# Patient Record
Sex: FEMALE | Race: WHITE
[De-identification: names, ages, dates, MRNs, and addresses within clinical notes are randomized per-mention and may not be internally consistent; named-entity substitution may affect disease eponyms.]

---

## 2017-01-20 ENCOUNTER — HOSPITAL ENCOUNTER (OUTPATIENT)
Dept: HOSPITAL 47 - RADMAMWWP | Age: 77
Discharge: HOME | End: 2017-01-20
Payer: MEDICARE

## 2017-01-20 DIAGNOSIS — Z12.31: Primary | ICD-10-CM

## 2017-01-20 PROCEDURE — 77063 BREAST TOMOSYNTHESIS BI: CPT

## 2017-01-23 NOTE — MM
Reason for exam: screening  (asymptomatic).

Last mammogram was performed 1 year and 1 month ago.



History:

Patient is postmenopausal and has history of endometrial cancer at age 70.

Family history of breast cancer in sister at age 78.

Benign US biopsy breast VAD LT of the left breast, April 15, 2015.  Benign core 

biopsy of the left breast, 1990.  Benign core biopsy of the right breast, 1980.



Physical Findings:

A clinical breast exam by your physician is recommended on an annual basis and 

results should be correlated with mammographic findings.



MG 3D Screening Mammo W/Cad

Bilateral CC and MLO view(s) were taken.

Prior study comparison: December 10, 2015, bilateral MG 3d diag mammo w/cad [facility].  

December 11, 2014, bilateral MG diagnostic mammo w CAD [facility].

The breast tissue is heterogeneously dense. This may lower the sensitivity of 

mammography.  Benign calcifications. Previous mammotome biopsy in the left breast.

There is chronic nodularity bilaterally.  No significant changes when compared 

with prior studies.





ASSESSMENT: Benign, BI-RAD 2



RECOMMENDATION:

Routine screening mammogram of both breasts in 1 year.

## 2017-05-11 ENCOUNTER — HOSPITAL ENCOUNTER (OUTPATIENT)
Dept: HOSPITAL 47 - RADXRMAIN | Age: 77
Discharge: HOME | End: 2017-05-11
Payer: MEDICARE

## 2017-05-11 DIAGNOSIS — J20.8: Primary | ICD-10-CM

## 2017-05-11 PROCEDURE — 71020: CPT

## 2017-05-11 NOTE — XR
EXAMINATION TYPE: XR chest 2V

 

DATE OF EXAM: 5/11/2017 9:20 AM

 

COMPARISON: NONE

 

TECHNIQUE: PA and lateral views submitted.

 

HISTORY: Cough

 

FINDINGS:

The lungs are clear and  there is no pneumothorax, pleural effusion, or focal pneumonia.  Vascular ca
lcifications noted. Apical pleural thickening. Atherosclerotic change aorta. Borderline cardiomegaly.


 

IMPRESSION: 

1. No acute process.

## 2017-06-13 ENCOUNTER — HOSPITAL ENCOUNTER (OUTPATIENT)
Dept: HOSPITAL 47 - RADBDWWP | Age: 77
Discharge: HOME | End: 2017-06-13
Payer: MEDICARE

## 2017-06-13 DIAGNOSIS — M85.88: Primary | ICD-10-CM

## 2017-06-13 PROCEDURE — 77080 DXA BONE DENSITY AXIAL: CPT

## 2017-06-13 NOTE — BD
EXAMINATION TYPE: MG DEXA axial skeleton.  

 

DATE OF EXAM: 6/13/2017

 

COMPARISON: 2015

 

CLINICAL HISTORY: osteoporosis

 

Height:  5'4

Weight:  133

 

FRAX RISK QUESTIONS:

Alcohol (3 or more units per day):  no

Family History (Parent hip fracture):  no

Glucocorticoids (More than 3mos):  no

           (Ex: prednisone, prednisolone, methylprednisolone, dexamethasone, and hydrocortisone).    
     

History of Fracture in Adulthood: no

Secondary Osteoporosis:

  1.  Type 1 Diabetes: no

  2.  Hyperthyroidism: no

  3.  Menopause before 45: no

  4.  Malnutrition: no

  5.  Chronic liver disease: no

Rheumatoid Arthritis: no

Current Tobacco Use: no

 

RISK FACTORS 

HISTORY OF: 

Family History of Osteoporosis:

Active:

Diet low in dairy products/other sources of calcium:

Postmenopausal woman:

 

MEDICATIONS: 

Osteoporosis Medications: 

fosamax

 3 years

Additional Medications: heart  

 

 

Additional History:

 

 

EXAM MEASUREMENTS: 

Bone mineral densitometry was performed using the [employer] System.

Bone mineral density as measured about the Lumbar spine is:  

----- L1-L4(G/cm2): 1.452

T Score Values are as follows:

----- L2: 3.0

----- L3: 3.5

----- L4: 1.1

----- L1-L4: 2.3

Bone mineral density has: Increased 7.7% since study of: 06/08/2015

 

Bone mineral density about the R hip (g/cm2): 0.891

Bone mineral density about the L hip (g/cm2): 0.882

T Score values are as follows:

-----R Neck: -1.1

-----L Neck: -1.1

-----R Total: -0.9

-----L Total: -1.4

Bone mineral density has: Increased 2.0% since study of: 06/08/2015

 

 

IMPRESSION:

Osteopenia (T Score between -2.5 and -1 as noted by T score values : Terry Hips

There is slightly increased risk of fracture and the patient may be considered 

for treatment. Re-Screen 2-5 years.

 

Bone density has improved 2% within the bilateral hips from 2015. Bone density has improved 7.7% with
in the lumbar spine from 2015

 

 

 

NOTE:  T-SCORE=SD OF THE YOUNG ADULT MEAN.

## 2017-08-03 ENCOUNTER — HOSPITAL ENCOUNTER (OUTPATIENT)
Dept: HOSPITAL 47 - LABWHC1 | Age: 77
Discharge: HOME | End: 2017-08-03
Payer: MEDICARE

## 2017-08-03 DIAGNOSIS — E11.9: Primary | ICD-10-CM

## 2017-08-03 DIAGNOSIS — E78.5: ICD-10-CM

## 2017-08-03 LAB
ALP SERPL-CCNC: 65 U/L (ref 38–126)
ALT SERPL-CCNC: 28 U/L (ref 9–52)
ANION GAP SERPL CALC-SCNC: 6 MMOL/L
AST SERPL-CCNC: 23 U/L (ref 14–36)
BUN SERPL-SCNC: 17 MG/DL (ref 7–17)
CALCIUM SPEC-MCNC: 9 MG/DL (ref 8.4–10.2)
CH: 31.4
CHCM: 31.6
CHLORIDE SERPL-SCNC: 107 MMOL/L (ref 98–107)
CHOLEST SERPL-MCNC: 195 MG/DL (ref ?–200)
CO2 SERPL-SCNC: 30 MMOL/L (ref 22–30)
ERYTHROCYTE [DISTWIDTH] IN BLOOD BY AUTOMATED COUNT: 4.23 M/UL (ref 3.8–5.4)
ERYTHROCYTE [DISTWIDTH] IN BLOOD: 14.2 % (ref 11.5–15.5)
GLUCOSE SERPL-MCNC: 86 MG/DL (ref 74–99)
HCT VFR BLD AUTO: 42.3 % (ref 34–46)
HDLC SERPL-MCNC: 62 MG/DL (ref 40–60)
HDW: 2.93
HEMOGLOBIN A1C: 5.6 % (ref 4.2–6.1)
HGB BLD-MCNC: 13.5 GM/DL (ref 11.4–16)
MCH RBC QN AUTO: 32 PG (ref 25–35)
MCHC RBC AUTO-ENTMCNC: 32 G/DL (ref 31–37)
MCV RBC AUTO: 100 FL (ref 80–100)
NON-AFRICAN AMERICAN GFR(MDRD): >60
POTASSIUM SERPL-SCNC: 4.6 MMOL/L (ref 3.5–5.1)
PROT SERPL-MCNC: 6.5 G/DL (ref 6.3–8.2)
SODIUM SERPL-SCNC: 143 MMOL/L (ref 137–145)
WBC # BLD AUTO: 5.4 K/UL (ref 3.8–10.6)

## 2017-08-03 PROCEDURE — 83036 HEMOGLOBIN GLYCOSYLATED A1C: CPT

## 2017-08-03 PROCEDURE — 80061 LIPID PANEL: CPT

## 2017-08-03 PROCEDURE — 36415 COLL VENOUS BLD VENIPUNCTURE: CPT

## 2017-08-03 PROCEDURE — 80053 COMPREHEN METABOLIC PANEL: CPT

## 2017-08-03 PROCEDURE — 84443 ASSAY THYROID STIM HORMONE: CPT

## 2017-08-03 PROCEDURE — 85027 COMPLETE CBC AUTOMATED: CPT

## 2017-08-03 PROCEDURE — 84439 ASSAY OF FREE THYROXINE: CPT

## 2017-09-19 ENCOUNTER — HOSPITAL ENCOUNTER (OUTPATIENT)
Dept: HOSPITAL 47 - RADUSWWP | Age: 77
End: 2017-09-19
Payer: MEDICARE

## 2017-09-19 DIAGNOSIS — R26.0: ICD-10-CM

## 2017-09-19 DIAGNOSIS — G62.9: Primary | ICD-10-CM

## 2017-09-19 PROCEDURE — 93970 EXTREMITY STUDY: CPT

## 2017-09-19 NOTE — US
EXAMINATION TYPE: US venous doppler duplex LE 

 

DATE OF EXAM: 9/19/2017 1:57 PM

 

COMPARISON: US

 

CLINICAL HISTORY: R26.0 Ataxic gait,G62.9 Polyneuropathy.

 

SIDE PERFORMED: Bilateral  

 

TECHNIQUE:  The lower extremity deep venous system is examined utilizing real time linear array sonog
shreya with graded compression, doppler sonography and color-flow sonography.

 

VESSELS IMAGED:

External Iliac Vein (EIV)

Common Femoral Vein

Deep Femoral Vein

Greater Saphenous Vein *

Femoral Vein

Popliteal Vein

Small Saphenous Vein *

Proximal Calf Veins

(* superficial vessels)

 

Grayscale, color doppler, spectral doppler imaging performed of the deep veins of the lower extremiti
es.  There is normal flow, compressibility, vascular waveforms.

 

Results phoned to Leah at office.

 

Right Leg:  Negative for DVT

 

Left Leg:  Negative for DVT

 

 

 

IMPRESSION:  No evidence of DVT.

## 2017-12-04 ENCOUNTER — HOSPITAL ENCOUNTER (OUTPATIENT)
Dept: HOSPITAL 47 - RADFLWHC | Age: 77
End: 2017-12-04
Payer: MEDICARE

## 2017-12-04 DIAGNOSIS — Z53.9: Primary | ICD-10-CM

## 2018-01-16 ENCOUNTER — HOSPITAL ENCOUNTER (EMERGENCY)
Dept: HOSPITAL 47 - EC | Age: 78
Discharge: HOME | End: 2018-01-16
Payer: MEDICARE

## 2018-01-16 VITALS
HEART RATE: 93 BPM | SYSTOLIC BLOOD PRESSURE: 169 MMHG | TEMPERATURE: 97.9 F | DIASTOLIC BLOOD PRESSURE: 89 MMHG | RESPIRATION RATE: 17 BRPM

## 2018-01-16 DIAGNOSIS — M81.0: ICD-10-CM

## 2018-01-16 DIAGNOSIS — Z88.6: ICD-10-CM

## 2018-01-16 DIAGNOSIS — M54.9: ICD-10-CM

## 2018-01-16 DIAGNOSIS — R00.0: ICD-10-CM

## 2018-01-16 DIAGNOSIS — R11.2: ICD-10-CM

## 2018-01-16 DIAGNOSIS — R19.7: ICD-10-CM

## 2018-01-16 DIAGNOSIS — Z79.899: ICD-10-CM

## 2018-01-16 DIAGNOSIS — G62.9: ICD-10-CM

## 2018-01-16 DIAGNOSIS — R10.9: Primary | ICD-10-CM

## 2018-01-16 DIAGNOSIS — M79.89: ICD-10-CM

## 2018-01-16 LAB
ALBUMIN SERPL-MCNC: 3.5 G/DL (ref 3.5–5)
ALP SERPL-CCNC: 78 U/L (ref 38–126)
ALT SERPL-CCNC: 31 U/L (ref 9–52)
ANION GAP SERPL CALC-SCNC: 10 MMOL/L
AST SERPL-CCNC: 23 U/L (ref 14–36)
BASOPHILS # BLD AUTO: 0 K/UL (ref 0–0.2)
BASOPHILS NFR BLD AUTO: 0 %
BUN SERPL-SCNC: 22 MG/DL (ref 7–17)
CALCIUM SPEC-MCNC: 9.2 MG/DL (ref 8.4–10.2)
CHLORIDE SERPL-SCNC: 108 MMOL/L (ref 98–107)
CO2 SERPL-SCNC: 26 MMOL/L (ref 22–30)
EOSINOPHIL # BLD AUTO: 0.1 K/UL (ref 0–0.7)
EOSINOPHIL NFR BLD AUTO: 1 %
ERYTHROCYTE [DISTWIDTH] IN BLOOD BY AUTOMATED COUNT: 4.09 M/UL (ref 3.8–5.4)
ERYTHROCYTE [DISTWIDTH] IN BLOOD: 14.8 % (ref 11.5–15.5)
GLUCOSE SERPL-MCNC: 107 MG/DL (ref 74–99)
HCT VFR BLD AUTO: 39.1 % (ref 34–46)
HGB BLD-MCNC: 11.6 GM/DL (ref 11.4–16)
LIPASE SERPL-CCNC: 202 U/L (ref 23–300)
LYMPHOCYTES # SPEC AUTO: 0.9 K/UL (ref 1–4.8)
LYMPHOCYTES NFR SPEC AUTO: 7 %
MAGNESIUM SPEC-SCNC: 1.9 MG/DL (ref 1.6–2.3)
MCH RBC QN AUTO: 28.3 PG (ref 25–35)
MCHC RBC AUTO-ENTMCNC: 29.7 G/DL (ref 31–37)
MCV RBC AUTO: 95.4 FL (ref 80–100)
MONOCYTES # BLD AUTO: 0.3 K/UL (ref 0–1)
MONOCYTES NFR BLD AUTO: 3 %
NEUTROPHILS # BLD AUTO: 11.6 K/UL (ref 1.3–7.7)
NEUTROPHILS NFR BLD AUTO: 89 %
PH UR: 7.5 [PH] (ref 5–8)
PLATELET # BLD AUTO: 246 K/UL (ref 150–450)
POTASSIUM SERPL-SCNC: 3.7 MMOL/L (ref 3.5–5.1)
PROT SERPL-MCNC: 6.3 G/DL (ref 6.3–8.2)
RBC UR QL: 1 /HPF (ref 0–5)
SODIUM SERPL-SCNC: 144 MMOL/L (ref 137–145)
SP GR UR: 1.01 (ref 1–1.03)
SQUAMOUS UR QL AUTO: <1 /HPF (ref 0–4)
UROBILINOGEN UR QL STRIP: <2 MG/DL (ref ?–2)
WBC # BLD AUTO: 12.9 K/UL (ref 3.8–10.6)
WBC #/AREA URNS HPF: 1 /HPF (ref 0–5)

## 2018-01-16 PROCEDURE — 93005 ELECTROCARDIOGRAM TRACING: CPT

## 2018-01-16 PROCEDURE — 81001 URINALYSIS AUTO W/SCOPE: CPT

## 2018-01-16 PROCEDURE — 74022 RADEX COMPL AQT ABD SERIES: CPT

## 2018-01-16 PROCEDURE — 96361 HYDRATE IV INFUSION ADD-ON: CPT

## 2018-01-16 PROCEDURE — 82607 VITAMIN B-12: CPT

## 2018-01-16 PROCEDURE — 83735 ASSAY OF MAGNESIUM: CPT

## 2018-01-16 PROCEDURE — 99285 EMERGENCY DEPT VISIT HI MDM: CPT

## 2018-01-16 PROCEDURE — 83605 ASSAY OF LACTIC ACID: CPT

## 2018-01-16 PROCEDURE — 36415 COLL VENOUS BLD VENIPUNCTURE: CPT

## 2018-01-16 PROCEDURE — 85025 COMPLETE CBC W/AUTO DIFF WBC: CPT

## 2018-01-16 PROCEDURE — 96372 THER/PROPH/DIAG INJ SC/IM: CPT

## 2018-01-16 PROCEDURE — 83690 ASSAY OF LIPASE: CPT

## 2018-01-16 PROCEDURE — 74177 CT ABD & PELVIS W/CONTRAST: CPT

## 2018-01-16 PROCEDURE — 80053 COMPREHEN METABOLIC PANEL: CPT

## 2018-01-16 PROCEDURE — 96374 THER/PROPH/DIAG INJ IV PUSH: CPT

## 2018-01-16 NOTE — CT
EXAMINATION TYPE: CT abdomen pelvis w con

 

DATE OF EXAM: 1/16/2018

 

COMPARISON: 9/8/2010

 

HISTORY: Periumbilical pain and back pain starting today

 

CT DLP: 1303 mGycm

Automated exposure control for dose reduction was used.

 

TECHNIQUE:  Helical acquisition of images was performed from the lung bases through the pelvis.

 

CONTRAST: 

Performed without Oral Contrast and with IV Contrast, patient injected with 100ml mL of Omnipaque 300
.

 

FINDINGS: 

 

Lung bases are clear of consolidation. Heart is enlarged. There is no pleural effusion.

 

Liver shows no focal defect. Bile ducts are not dilated. Spleen and pancreas appear normal. Gallbladd
er appears normal. There is lumbar levoscoliosis.

 

There is no adrenal mass. Kidneys show satisfactory contrast opacification. There is no hydronephrosi
s. There is a 1 cm cortical cyst on the lower pole right kidney. There is probably a 5 mm calculus in
 the lower pole right kidney. There is no retroperitoneal adenopathy. There is no ascites. Bladder di
stends smoothly. There are surgical clips in the pelvis. There is fluid in the large bowel. There is 
no sign of free air. I see no focal bone destruction. There is a tiny amount of free fluid in the pel
vis.

IMPRESSION: 

NONOBSTRUCTING SMALL RIGHT RENAL CALCULUS APPEARS NEW COMPARED TO OLD EXAM. SMALL RIGHT RENAL CORTICA
L CYST. LARGE BOWEL FLUID CONSISTENT WITH DIARRHEA. THERE IS A TINY AMOUNT OF FLUID IN THE CUL-DE-SAC
 OF UNCERTAIN SIGNIFICANCE. CARDIOMEGALY.

## 2018-01-16 NOTE — XR
EXAMINATION TYPE: XR abdomen acute w cxr

 

DATE OF EXAM: 1/16/2018

 

COMPARISON: 1/8/2013

 

HISTORY: Back pain

 

TECHNIQUE:  4 views including upright abdomen and chest x-ray

 

FINDINGS:  

 

Lungs are clear. There is no heart failure. There is no sign of intestinal obstruction or pneumoperit
oneum. Fecal pattern is normal. There is lumbar levorotoscoliosis. There are no pathologic calcificat
ions over the kidneys. There are numerous surgical clips in the pelvis..  

 

IMPRESSION: 

Nonacute abdomen. No active cardiopulmonary disease.

## 2018-01-16 NOTE — ED
Abdominal Pain HPI





- General


Stated Complaint: abdominal pain


Time Seen by Provider: 01/16/18 18:01





- History of Present Illness


Initial Comments: 





This is a 77-year-old female with a history of neuropathy and osteoporosis who 

presents emergency department for back pain and abdominal pain.  She states it 

started around 1 PM.  She states that it was constant and gradually worsening.  

Seemed to wax and wane in intensity but was there.  She states that she didn't 

want to eat because the pain was getting bad.  She states it did not radiate.  

She has had no dysuria or hematuria.  No vomiting or diarrhea.  No fevers or 

chills.  No chest pain or shortness of breath.  She states that in route the 

abdominal pain seemed to improve.  Of note he must did notice that her heart 

rate did increase when she stood up to get on the gurney and the patient felt 

lightheaded.  No other acute complaints.





- Related Data


 Home Medications











 Medication  Instructions  Recorded  Confirmed


 


Alendronate Sodium 70 mg PO BROWN 11/25/14 01/16/18


 


Nitroglycerin Sl Tabs [Nitrostat] 0.4 mg SL AS DIRECTED PRN 11/25/14 01/16/18


 


Gabapentin [Neurontin] 300 mg PO HS 01/16/18 01/16/18








 Previous Rx's











 Medication  Instructions  Recorded


 


Docusate [Colace] 100 mg PO DAILY #30 capsule 01/16/18











 Allergies











Allergy/AdvReac Type Severity Reaction Status Date / Time


 


ibuprofen [From Advil] AdvReac  Nausea & Verified 01/16/18 18:17





   Vomiting  














Review of Systems


ROS Statement: 


Those systems with pertinent positive or pertinent negative responses have been 

documented in the HPI.





ROS Other: All systems not noted in ROS Statement are negative.





Past Medical History


Additional Past Medical History / Comment(s): mitral valve prolapse.  blind


History of Any Multi-Drug Resistant Organisms: None Reported


Additional Past Surgical History / Comment(s): eye surgery


Past Psychological History: No Psychological Hx Reported


Smoking Status: Never smoker


Past Alcohol Use History: None Reported





General Exam





- General Exam Comments


Initial Comments: 





Constitutional: Awake alert Appears comfortable


Head: Normocephalic atraumatic 


Eyes: no conjunctival injection No scleral icterus EOMI


Neck: No JVD Supple


Heart: Regular rate rhythm normal S1-S2 no murmurs


Lungs: Clear to auscultation bilaterally No wheezing No rales


Abdomen: Soft nondistended nontender


Extremities: Mild swelling in bilateral lower extremities which the patient 

states is chronic DP pulses intact Radial pulses intact


Neuro: A&Ox3 No focal neurologic deficits


Psych: Appropriate mood and affect








Course


 Vital Signs











  01/16/18 01/16/18 01/16/18





  18:02 18:29 19:06


 


Temperature 97.1 F L  97.6 F


 


Pulse Rate 86 78 73


 


Respiratory 16 16 16





Rate   


 


Blood Pressure 193/79 178/74 198/88


 


O2 Sat by Pulse 98 100 96





Oximetry   














- Reevaluation(s)


Reevaluation #1: 





01/16/18 18:49


EKG showing sinus rhythm at a rate 76.  No abnormal ST segment changes or T-

wave inversions.  QTC is 436.  Other intervals normal.  One PVC.





Medical Decision Making





- Medical Decision Making





This is a 77-year-old female who presents emergency department for abdominal 

pain and nausea.  X-ray did show a few air-fluid levels and thus a computed 

tomography scan was performed.  Before CT could be performed the patient had a 

large amount of stool and diarrhea on the bed.  She also had an episode of 

vomiting.  After this she felt much improved.  CAT scan did not show any 

evidence for obstruction.  At this time I feel the patient does have some 

stricturing.  She states that she has a stricture in her intestines from her 

previous surgery.  I suspect that she may have had a partial obstruction that 

resolved itself in the emergency department.  The patient feels much improved 

and wants to go home.  She has a follow-up appointment with Dr. Graves 

tomorrow.  We'll place her on Colace to help with bowel movements in the 

future.  Told to return if she has worsening or changing symptoms.  All 

questions were answered.





- Lab Data


Result diagrams: 


 01/16/18 18:10





 01/16/18 18:10


 Lab Results











  01/16/18 01/16/18 01/16/18 Range/Units





  18:10 18:10 18:10 


 


WBC  12.9 H    (3.8-10.6)  k/uL


 


RBC  4.09    (3.80-5.40)  m/uL


 


Hgb  11.6    (11.4-16.0)  gm/dL


 


Hct  39.1    (34.0-46.0)  %


 


MCV  95.4  D    (80.0-100.0)  fL


 


MCH  28.3    (25.0-35.0)  pg


 


MCHC  29.7 L    (31.0-37.0)  g/dL


 


RDW  14.8    (11.5-15.5)  %


 


Plt Count  246    (150-450)  k/uL


 


Neutrophils %  89    %


 


Lymphocytes %  7    %


 


Monocytes %  3    %


 


Eosinophils %  1    %


 


Basophils %  0    %


 


Neutrophils #  11.6 H    (1.3-7.7)  k/uL


 


Lymphocytes #  0.9 L    (1.0-4.8)  k/uL


 


Monocytes #  0.3    (0-1.0)  k/uL


 


Eosinophils #  0.1    (0-0.7)  k/uL


 


Basophils #  0.0    (0-0.2)  k/uL


 


Hypochromasia  Marked    


 


Sodium   144   (137-145)  mmol/L


 


Potassium   3.7   (3.5-5.1)  mmol/L


 


Chloride   108 H   ()  mmol/L


 


Carbon Dioxide   26   (22-30)  mmol/L


 


Anion Gap   10   mmol/L


 


BUN   22 H   (7-17)  mg/dL


 


Creatinine   0.75   (0.52-1.04)  mg/dL


 


Est GFR (MDRD) Af Amer   >60   (>60 ml/min/1.73 sqM)  


 


Est GFR (MDRD) Non-Af   >60   (>60 ml/min/1.73 sqM)  


 


Glucose   107 H   (74-99)  mg/dL


 


Plasma Lactic Acid Zia    2.3 H*  (0.7-2.0)  mmol/L


 


Calcium   9.2   (8.4-10.2)  mg/dL


 


Magnesium   1.9   (1.6-2.3)  mg/dL


 


Total Bilirubin   0.5   (0.2-1.3)  mg/dL


 


AST   23   (14-36)  U/L


 


ALT   31   (9-52)  U/L


 


Alkaline Phosphatase   78   ()  U/L


 


Total Protein   6.3   (6.3-8.2)  g/dL


 


Albumin   3.5   (3.5-5.0)  g/dL


 


Lipase   202   ()  U/L


 


Urine Color     


 


Urine Appearance     (Clear)  


 


Urine pH     (5.0-8.0)  


 


Ur Specific Gravity     (1.001-1.035)  


 


Urine Protein     (Negative)  


 


Urine Glucose (UA)     (Negative)  


 


Urine Ketones     (Negative)  


 


Urine Blood     (Negative)  


 


Urine Nitrite     (Negative)  


 


Urine Bilirubin     (Negative)  


 


Urine Urobilinogen     (<2.0)  mg/dL


 


Ur Leukocyte Esterase     (Negative)  


 


Urine RBC     (0-5)  /hpf


 


Urine WBC     (0-5)  /hpf


 


Ur Squamous Epith Cells     (0-4)  /hpf


 


Urine Mucus     (None)  /hpf














  01/16/18 Range/Units





  18:55 


 


WBC   (3.8-10.6)  k/uL


 


RBC   (3.80-5.40)  m/uL


 


Hgb   (11.4-16.0)  gm/dL


 


Hct   (34.0-46.0)  %


 


MCV   (80.0-100.0)  fL


 


MCH   (25.0-35.0)  pg


 


MCHC   (31.0-37.0)  g/dL


 


RDW   (11.5-15.5)  %


 


Plt Count   (150-450)  k/uL


 


Neutrophils %   %


 


Lymphocytes %   %


 


Monocytes %   %


 


Eosinophils %   %


 


Basophils %   %


 


Neutrophils #   (1.3-7.7)  k/uL


 


Lymphocytes #   (1.0-4.8)  k/uL


 


Monocytes #   (0-1.0)  k/uL


 


Eosinophils #   (0-0.7)  k/uL


 


Basophils #   (0-0.2)  k/uL


 


Hypochromasia   


 


Sodium   (137-145)  mmol/L


 


Potassium   (3.5-5.1)  mmol/L


 


Chloride   ()  mmol/L


 


Carbon Dioxide   (22-30)  mmol/L


 


Anion Gap   mmol/L


 


BUN   (7-17)  mg/dL


 


Creatinine   (0.52-1.04)  mg/dL


 


Est GFR (MDRD) Af Amer   (>60 ml/min/1.73 sqM)  


 


Est GFR (MDRD) Non-Af   (>60 ml/min/1.73 sqM)  


 


Glucose   (74-99)  mg/dL


 


Plasma Lactic Acid Zia   (0.7-2.0)  mmol/L


 


Calcium   (8.4-10.2)  mg/dL


 


Magnesium   (1.6-2.3)  mg/dL


 


Total Bilirubin   (0.2-1.3)  mg/dL


 


AST   (14-36)  U/L


 


ALT   (9-52)  U/L


 


Alkaline Phosphatase   ()  U/L


 


Total Protein   (6.3-8.2)  g/dL


 


Albumin   (3.5-5.0)  g/dL


 


Lipase   ()  U/L


 


Urine Color  Yellow  


 


Urine Appearance  Clear  (Clear)  


 


Urine pH  7.5  (5.0-8.0)  


 


Ur Specific Gravity  1.010  (1.001-1.035)  


 


Urine Protein  Negative  (Negative)  


 


Urine Glucose (UA)  Negative  (Negative)  


 


Urine Ketones  Negative  (Negative)  


 


Urine Blood  Negative  (Negative)  


 


Urine Nitrite  Negative  (Negative)  


 


Urine Bilirubin  Negative  (Negative)  


 


Urine Urobilinogen  <2.0  (<2.0)  mg/dL


 


Ur Leukocyte Esterase  Small H  (Negative)  


 


Urine RBC  1  (0-5)  /hpf


 


Urine WBC  1  (0-5)  /hpf


 


Ur Squamous Epith Cells  <1  (0-4)  /hpf


 


Urine Mucus  Rare H  (None)  /hpf














Disposition


Clinical Impression: 


 Abdominal pain, Diarrhea





Disposition: HOME SELF-CARE


Condition: Stable


Instructions:  Abdominal Pain (ED)


Prescriptions: 


Docusate [Colace] 100 mg PO DAILY #30 capsule


Referrals: 


Ashwin Reynoso DO [Primary Care Provider] - 1-2 days

## 2018-02-15 ENCOUNTER — HOSPITAL ENCOUNTER (OUTPATIENT)
Dept: HOSPITAL 47 - RADMAMWWP | Age: 78
End: 2018-02-15
Payer: MEDICARE

## 2018-02-15 DIAGNOSIS — Z12.31: Primary | ICD-10-CM

## 2018-02-15 PROCEDURE — 77063 BREAST TOMOSYNTHESIS BI: CPT

## 2018-02-15 PROCEDURE — 77067 SCR MAMMO BI INCL CAD: CPT

## 2018-02-19 NOTE — MM
Reason for exam: screening  (asymptomatic).

Last mammogram was performed 1 year and 1 month ago.



History:

Patient is postmenopausal and has history of endometrial cancer at age 70.

Family history of breast cancer in sister at age 78.

Benign US biopsy breast VAD LT of the left breast, April 15, 2015.  Benign core 

biopsy of the left breast, 1990.  Benign core biopsy of the right breast, 1980.



Physical Findings:

A clinical breast exam by your physician is recommended on an annual basis and 

results should be correlated with mammographic findings.



MG 3D Screening Mammo W/Cad

Bilateral CC and MLO view(s) were taken.

Prior study comparison: January 20, 2017, bilateral MG 3d screening mammo w/cad.  

December 10, 2015, bilateral MG 3d diag mammo w/cad [facility].

The breast tissue is heterogeneously dense. This may lower the sensitivity of 

mammography.

Finding: There are new heterogeneous, grouped/clustered calcifications in the 

anterior position of the left breast. There is a chronic nodularity bilaterally.

No significant changes in finding since January 20, 2017 and December 10, 2015.





ASSESSMENT: Benign, BI-RAD 2



RECOMMENDATION:

Routine screening mammogram of both breasts in 1 year.

## 2018-04-13 ENCOUNTER — HOSPITAL ENCOUNTER (OUTPATIENT)
Dept: HOSPITAL 47 - EC | Age: 78
Setting detail: OBSERVATION
LOS: 2 days | Discharge: HOME | End: 2018-04-15
Payer: MEDICARE

## 2018-04-13 VITALS — BODY MASS INDEX: 21.7 KG/M2

## 2018-04-13 DIAGNOSIS — Z79.899: ICD-10-CM

## 2018-04-13 DIAGNOSIS — Z80.49: ICD-10-CM

## 2018-04-13 DIAGNOSIS — M19.90: ICD-10-CM

## 2018-04-13 DIAGNOSIS — E86.1: ICD-10-CM

## 2018-04-13 DIAGNOSIS — Z79.82: ICD-10-CM

## 2018-04-13 DIAGNOSIS — E87.0: ICD-10-CM

## 2018-04-13 DIAGNOSIS — Z80.0: ICD-10-CM

## 2018-04-13 DIAGNOSIS — Z85.42: ICD-10-CM

## 2018-04-13 DIAGNOSIS — Z82.49: ICD-10-CM

## 2018-04-13 DIAGNOSIS — Z84.1: ICD-10-CM

## 2018-04-13 DIAGNOSIS — D64.9: ICD-10-CM

## 2018-04-13 DIAGNOSIS — K56.7: ICD-10-CM

## 2018-04-13 DIAGNOSIS — Z86.718: ICD-10-CM

## 2018-04-13 DIAGNOSIS — K52.0: Primary | ICD-10-CM

## 2018-04-13 DIAGNOSIS — R19.5: ICD-10-CM

## 2018-04-13 DIAGNOSIS — J02.9: ICD-10-CM

## 2018-04-13 DIAGNOSIS — G62.9: ICD-10-CM

## 2018-04-13 DIAGNOSIS — Z88.6: ICD-10-CM

## 2018-04-13 DIAGNOSIS — Z80.3: ICD-10-CM

## 2018-04-13 DIAGNOSIS — I34.1: ICD-10-CM

## 2018-04-13 DIAGNOSIS — E87.2: ICD-10-CM

## 2018-04-13 DIAGNOSIS — R55: ICD-10-CM

## 2018-04-13 DIAGNOSIS — R49.0: ICD-10-CM

## 2018-04-13 DIAGNOSIS — E86.0: ICD-10-CM
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
## 2018-04-13 DIAGNOSIS — H40.9: ICD-10-CM

## 2018-04-13 DIAGNOSIS — E16.2: ICD-10-CM

## 2018-04-13 DIAGNOSIS — Z92.3: ICD-10-CM

## 2018-04-13 DIAGNOSIS — I10: ICD-10-CM

## 2018-04-13 DIAGNOSIS — H54.8: ICD-10-CM

## 2018-04-13 LAB
ALBUMIN SERPL-MCNC: 4.1 G/DL (ref 3.5–5)
ALP SERPL-CCNC: 102 U/L (ref 38–126)
ALT SERPL-CCNC: 31 U/L (ref 9–52)
AMYLASE SERPL-CCNC: 71 U/L (ref 30–110)
ANION GAP SERPL CALC-SCNC: 18 MMOL/L
AST SERPL-CCNC: 31 U/L (ref 14–36)
BASOPHILS # BLD AUTO: 0 K/UL (ref 0–0.2)
BASOPHILS NFR BLD AUTO: 0 %
BUN SERPL-SCNC: 20 MG/DL (ref 7–17)
CALCIUM SPEC-MCNC: 10.1 MG/DL (ref 8.4–10.2)
CHLORIDE SERPL-SCNC: 102 MMOL/L (ref 98–107)
CK SERPL-CCNC: 32 U/L (ref 30–135)
CO2 SERPL-SCNC: 28 MMOL/L (ref 22–30)
EOSINOPHIL # BLD AUTO: 0.2 K/UL (ref 0–0.7)
EOSINOPHIL NFR BLD AUTO: 1 %
ERYTHROCYTE [DISTWIDTH] IN BLOOD BY AUTOMATED COUNT: 5.12 M/UL (ref 3.8–5.4)
ERYTHROCYTE [DISTWIDTH] IN BLOOD: 15.5 % (ref 11.5–15.5)
GLUCOSE BLD-MCNC: 122 MG/DL (ref 75–99)
GLUCOSE SERPL-MCNC: 152 MG/DL (ref 74–99)
HCT VFR BLD AUTO: 43.8 % (ref 34–46)
HGB BLD-MCNC: 14 GM/DL (ref 11.4–16)
KETONES UR QL STRIP.AUTO: (no result)
LIPASE SERPL-CCNC: 102 U/L (ref 23–300)
LYMPHOCYTES # SPEC AUTO: 0.5 K/UL (ref 1–4.8)
LYMPHOCYTES NFR SPEC AUTO: 3 %
MCH RBC QN AUTO: 27.4 PG (ref 25–35)
MCHC RBC AUTO-ENTMCNC: 32.1 G/DL (ref 31–37)
MCV RBC AUTO: 85.5 FL (ref 80–100)
MONOCYTES # BLD AUTO: 0.7 K/UL (ref 0–1)
MONOCYTES NFR BLD AUTO: 5 %
NEUTROPHILS # BLD AUTO: 13 K/UL (ref 1.3–7.7)
NEUTROPHILS NFR BLD AUTO: 90 %
PH UR: 6.5 [PH] (ref 5–8)
PLATELET # BLD AUTO: 274 K/UL (ref 150–450)
POTASSIUM SERPL-SCNC: 3.6 MMOL/L (ref 3.5–5.1)
PROT SERPL-MCNC: 7.4 G/DL (ref 6.3–8.2)
RBC UR QL: 2 /HPF (ref 0–5)
SODIUM SERPL-SCNC: 148 MMOL/L (ref 137–145)
SP GR UR: 1.02 (ref 1–1.03)
SQUAMOUS UR QL AUTO: 1 /HPF (ref 0–4)
TROPONIN I SERPL-MCNC: <0.012 NG/ML (ref 0–0.03)
UROBILINOGEN UR QL STRIP: <2 MG/DL (ref ?–2)
WBC # BLD AUTO: 14.5 K/UL (ref 3.8–10.6)
WBC #/AREA URNS HPF: 4 /HPF (ref 0–5)

## 2018-04-13 PROCEDURE — 87502 INFLUENZA DNA AMP PROBE: CPT

## 2018-04-13 PROCEDURE — 89055 LEUKOCYTE ASSESSMENT FECAL: CPT

## 2018-04-13 PROCEDURE — 83690 ASSAY OF LIPASE: CPT

## 2018-04-13 PROCEDURE — 96361 HYDRATE IV INFUSION ADD-ON: CPT

## 2018-04-13 PROCEDURE — 84484 ASSAY OF TROPONIN QUANT: CPT

## 2018-04-13 PROCEDURE — 93970 EXTREMITY STUDY: CPT

## 2018-04-13 PROCEDURE — 74177 CT ABD & PELVIS W/CONTRAST: CPT

## 2018-04-13 PROCEDURE — 82550 ASSAY OF CK (CPK): CPT

## 2018-04-13 PROCEDURE — 96365 THER/PROPH/DIAG IV INF INIT: CPT

## 2018-04-13 PROCEDURE — 99285 EMERGENCY DEPT VISIT HI MDM: CPT

## 2018-04-13 PROCEDURE — 82272 OCCULT BLD FECES 1-3 TESTS: CPT

## 2018-04-13 PROCEDURE — 87040 BLOOD CULTURE FOR BACTERIA: CPT

## 2018-04-13 PROCEDURE — 82553 CREATINE MB FRACTION: CPT

## 2018-04-13 PROCEDURE — 85025 COMPLETE CBC W/AUTO DIFF WBC: CPT

## 2018-04-13 PROCEDURE — 96375 TX/PRO/DX INJ NEW DRUG ADDON: CPT

## 2018-04-13 PROCEDURE — 87045 FECES CULTURE AEROBIC BACT: CPT

## 2018-04-13 PROCEDURE — 36415 COLL VENOUS BLD VENIPUNCTURE: CPT

## 2018-04-13 PROCEDURE — 93005 ELECTROCARDIOGRAM TRACING: CPT

## 2018-04-13 PROCEDURE — 87324 CLOSTRIDIUM AG IA: CPT

## 2018-04-13 PROCEDURE — 87046 STOOL CULTR AEROBIC BACT EA: CPT

## 2018-04-13 PROCEDURE — 83605 ASSAY OF LACTIC ACID: CPT

## 2018-04-13 PROCEDURE — 96366 THER/PROPH/DIAG IV INF ADDON: CPT

## 2018-04-13 PROCEDURE — 81001 URINALYSIS AUTO W/SCOPE: CPT

## 2018-04-13 PROCEDURE — 82150 ASSAY OF AMYLASE: CPT

## 2018-04-13 PROCEDURE — 84134 ASSAY OF PREALBUMIN: CPT

## 2018-04-13 PROCEDURE — 80053 COMPREHEN METABOLIC PANEL: CPT

## 2018-04-13 NOTE — ED
General Adult HPI





- General


Chief complaint: Syncope


Stated complaint: Weakness, Syncope


Time Seen by Provider: 04/13/18 21:10


Source: patient, EMS, RN notes reviewed


Mode of arrival: EMS





- History of Present Illness


Initial comments: 





This is a 78-year-old female presents emergency Department no significant past 

medical history that she can recall.  Patient states at 3:00 today she started 

having nausea vomiting diarrhea.  Patient states this evening she was sitting 

on the toilet having diarrhea when she got lightheaded and passed out according 

to her .  Patient states after she passed out she vomited one more time.

  Patient states she still mildly nauseated.  Patient denies any abdominal 

pain.  Patient denies any dysuria hematuria urinary freaky.  Patient denies 

chest pain difficulty breathing first breath per patient denies headache 

patient denies numbness weakness per patient denies any lightheadedness 

dizziness or near syncopal episode.





- Related Data


 Home Medications











 Medication  Instructions  Recorded  Confirmed


 


Alendronate Sodium 70 mg PO BROWN 11/25/14 04/13/18


 


Gabapentin [Neurontin] 300 mg PO HS 01/16/18 04/13/18


 


Aspirin [Adult Low Dose Aspirin EC] 81 mg PO HS 04/13/18 04/13/18


 


Brimonidine Tartrate [Alphagan P 1 drops BOTH EYES TID 04/13/18 04/13/18





0.2% Ophth Soln]   


 


Calcium Carbonate/Vitamin D3 1 tab PO BID 04/13/18 04/13/18





[Calcium 600-Vit D3 400 Caplet]   


 


Dorzolamide/Timolol/Pf [Cosopt Pf 1 applicator BOTH EYES TID 04/13/18 04/13/18





2%/5% Ophth Droperette]   


 


Furosemide [Lasix] 20 mg PO DAILY PRN 04/13/18 04/13/18


 


Potassium 99 mg PO DAILY PRN 04/13/18 04/13/18








 Previous Rx's











 Medication  Instructions  Recorded


 


Docusate [Colace] 100 mg PO DAILY #30 capsule 01/16/18











 Allergies











Allergy/AdvReac Type Severity Reaction Status Date / Time


 


ibuprofen [From Advil] AdvReac  Nausea & Verified 04/13/18 21:50





   Vomiting  














Review of Systems


ROS Statement: 


Those systems with pertinent positive or pertinent negative responses have been 

documented in the HPI.





ROS Other: All systems not noted in ROS Statement are negative.





Past Medical History


Past Medical History: Deep Vein Thrombosis (DVT)


Additional Past Medical History / Comment(s): mitral valve prolapse.  blind


History of Any Multi-Drug Resistant Organisms: None Reported


Additional Past Surgical History / Comment(s): eye surgery


Past Psychological History: No Psychological Hx Reported


Smoking Status: Never smoker


Past Alcohol Use History: None Reported





General Exam





- General Exam Comments


Initial Comments: 





GENERAL:


Patient is well-developed and well-nourished.  Patient is nontoxic and well-

hydrated and is in mild distress.





ENT:


Neck is soft and supple.  No significant lymphadenopathy is noted.  Oropharynx 

is clear.  Moist mucous membranes.  Neck has full range of motion without 

eliciting any pain. 





EYES:


The sclera were anicteric and conjunctiva were pink and moist.  Extraocular 

movements were intact and pupils were equal round and reactive to light.  

Eyelids were unremarkable.





PULMONARY:


Unlabored respirations.  Good breath sounds bilaterally.  No audible rales 

rhonchi or wheezing was noted.





CARDIOVASCULAR:


There is a regular rate and rhythm without any murmurs gallops or rubs.  





ABDOMEN:


Soft and nontender with normal bowel sounds.  No palpable organomegaly was 

noted.  There is no palpable pulsatile mass.





SKIN:


Skin is clear with no lesions or rashes and otherwise unremarkable.





NEUROLOGIC:


Patient is alert and oriented x3.  Cranial nerves II through XII are grossly 

intact.  Motor and sensory are also intact.  Normal speech, volume and content.

  Symmetrical smile.  





MUSCULOSKELETAL:


Normal extremities with adequate strength and full range of motion. 





LYMPHATICS:


No significant lymphadenopathy is noted





PSYCHIATRIC:


Normal psychiatric evaluation.  Normal interpersonal interactions appears 

functionally intact in deals appropriately with others.  No signs of 

depression.  No signs of anxiety. 





Course


 Vital Signs











  04/13/18 04/13/18 04/13/18





  21:18 22:22 22:32


 


Temperature 101.7 F H 100.8 F H 99.8 F H


 


Pulse Rate 92  91


 


Pulse Rate [   98





Right Supine]   


 


Pulse Rate [   





Sitting]   


 


Pulse Rate [   





Standing]   


 


Respiratory 18  16





Rate   


 


Blood Pressure 196/81  155/70


 


Blood Pressure   178/88





[Right Arm]   


 


O2 Sat by Pulse 99  98





Oximetry   














  04/13/18 04/13/18 04/13/18





  22:35 22:37 23:00


 


Temperature   


 


Pulse Rate   92


 


Pulse Rate [   





Right Supine]   


 


Pulse Rate [ 97  





Sitting]   


 


Pulse Rate [  92 





Standing]   


 


Respiratory 16 18 18





Rate   


 


Blood Pressure   178/74


 


Blood Pressure 217/107 202/82 





[Right Arm]   


 


O2 Sat by Pulse 100  98





Oximetry   














  04/14/18





  00:00


 


Temperature 


 


Pulse Rate 76


 


Pulse Rate [ 





Right Supine] 


 


Pulse Rate [ 





Sitting] 


 


Pulse Rate [ 





Standing] 


 


Respiratory 15





Rate 


 


Blood Pressure 146/90


 


Blood Pressure 





[Right Arm] 


 


O2 Sat by Pulse 95





Oximetry 














Medical Decision Making





- Medical Decision Making





EKG shows normal sinus rhythm at 92 bpm AL interval is on a 34 QRS is 84 QT 

interval 350 QTC is 432.  Patient's EKG shows some slight ST segment depression 

in leads 1 and 2 as well as precordial leads V5 and V6.





Patient was having difficulty ambulating because she still remained very weak.  

Patient remained pain-free.





Patient didn't feel comfortable going home so I admitted her 23 observation





- Lab Data


Result diagrams: 


 04/13/18 21:30





 04/13/18 21:30


 Lab Results











  04/13/18 04/13/18 04/13/18 Range/Units





  21:15 21:30 21:30 


 


WBC     (3.8-10.6)  k/uL


 


RBC     (3.80-5.40)  m/uL


 


Hgb     (11.4-16.0)  gm/dL


 


Hct     (34.0-46.0)  %


 


MCV     (80.0-100.0)  fL


 


MCH     (25.0-35.0)  pg


 


MCHC     (31.0-37.0)  g/dL


 


RDW     (11.5-15.5)  %


 


Plt Count     (150-450)  k/uL


 


Neutrophils %     %


 


Lymphocytes %     %


 


Monocytes %     %


 


Eosinophils %     %


 


Basophils %     %


 


Neutrophils #     (1.3-7.7)  k/uL


 


Lymphocytes #     (1.0-4.8)  k/uL


 


Monocytes #     (0-1.0)  k/uL


 


Eosinophils #     (0-0.7)  k/uL


 


Basophils #     (0-0.2)  k/uL


 


Hypochromasia     


 


Sodium   148 H   (137-145)  mmol/L


 


Potassium   3.6   (3.5-5.1)  mmol/L


 


Chloride   102   ()  mmol/L


 


Carbon Dioxide   28   (22-30)  mmol/L


 


Anion Gap   18   mmol/L


 


BUN   20 H   (7-17)  mg/dL


 


Creatinine   0.80   (0.52-1.04)  mg/dL


 


Est GFR (CKD-EPI)AfAm   82   (>60 ml/min/1.73 sqM)  


 


Est GFR (CKD-EPI)NonAf   72   (>60 ml/min/1.73 sqM)  


 


Glucose   152 H   (74-99)  mg/dL


 


POC Glucose (mg/dL)  122 H    (75-99)  mg/dL


 


POC Glu Operater ID  Agustín Harris    


 


Plasma Lactic Acid Zia     (0.7-2.0)  mmol/L


 


Calcium   10.1   (8.4-10.2)  mg/dL


 


Total Bilirubin   0.6   (0.2-1.3)  mg/dL


 


AST   31   (14-36)  U/L


 


ALT   31   (9-52)  U/L


 


Alkaline Phosphatase   102   ()  U/L


 


Total Creatine Kinase    32  ()  U/L


 


CK-MB (CK-2)    0.8  (0.0-2.4)  ng/mL


 


CK-MB (CK-2) Rel Index    2.5  


 


Troponin I    <0.012  (0.000-0.034)  ng/mL


 


Total Protein   7.4   (6.3-8.2)  g/dL


 


Albumin   4.1   (3.5-5.0)  g/dL


 


Amylase   71   ()  U/L


 


Lipase   102   ()  U/L


 


Urine Color     


 


Urine Appearance     (Clear)  


 


Urine pH     (5.0-8.0)  


 


Ur Specific Gravity     (1.001-1.035)  


 


Urine Protein     (Negative)  


 


Urine Glucose (UA)     (Negative)  


 


Urine Ketones     (Negative)  


 


Urine Blood     (Negative)  


 


Urine Nitrite     (Negative)  


 


Urine Bilirubin     (Negative)  


 


Urine Urobilinogen     (<2.0)  mg/dL


 


Ur Leukocyte Esterase     (Negative)  


 


Urine RBC     (0-5)  /hpf


 


Urine WBC     (0-5)  /hpf


 


Ur Squamous Epith Cells     (0-4)  /hpf


 


Urine Mucus     (None)  /hpf














  04/13/18 04/13/18 04/13/18 Range/Units





  21:30 21:30 22:15 


 


WBC  14.5 H    (3.8-10.6)  k/uL


 


RBC  5.12    (3.80-5.40)  m/uL


 


Hgb  14.0    (11.4-16.0)  gm/dL


 


Hct  43.8    (34.0-46.0)  %


 


MCV  85.5    (80.0-100.0)  fL


 


MCH  27.4    (25.0-35.0)  pg


 


MCHC  32.1    (31.0-37.0)  g/dL


 


RDW  15.5    (11.5-15.5)  %


 


Plt Count  274    (150-450)  k/uL


 


Neutrophils %  90    %


 


Lymphocytes %  3    %


 


Monocytes %  5    %


 


Eosinophils %  1    %


 


Basophils %  0    %


 


Neutrophils #  13.0 H    (1.3-7.7)  k/uL


 


Lymphocytes #  0.5 L    (1.0-4.8)  k/uL


 


Monocytes #  0.7    (0-1.0)  k/uL


 


Eosinophils #  0.2    (0-0.7)  k/uL


 


Basophils #  0.0    (0-0.2)  k/uL


 


Hypochromasia  Slight    


 


Sodium     (137-145)  mmol/L


 


Potassium     (3.5-5.1)  mmol/L


 


Chloride     ()  mmol/L


 


Carbon Dioxide     (22-30)  mmol/L


 


Anion Gap     mmol/L


 


BUN     (7-17)  mg/dL


 


Creatinine     (0.52-1.04)  mg/dL


 


Est GFR (CKD-EPI)AfAm     (>60 ml/min/1.73 sqM)  


 


Est GFR (CKD-EPI)NonAf     (>60 ml/min/1.73 sqM)  


 


Glucose     (74-99)  mg/dL


 


POC Glucose (mg/dL)     (75-99)  mg/dL


 


POC Glu Operater ID     


 


Plasma Lactic Acid Zia   3.7 H*   (0.7-2.0)  mmol/L


 


Calcium     (8.4-10.2)  mg/dL


 


Total Bilirubin     (0.2-1.3)  mg/dL


 


AST     (14-36)  U/L


 


ALT     (9-52)  U/L


 


Alkaline Phosphatase     ()  U/L


 


Total Creatine Kinase     ()  U/L


 


CK-MB (CK-2)     (0.0-2.4)  ng/mL


 


CK-MB (CK-2) Rel Index     


 


Troponin I     (0.000-0.034)  ng/mL


 


Total Protein     (6.3-8.2)  g/dL


 


Albumin     (3.5-5.0)  g/dL


 


Amylase     ()  U/L


 


Lipase     ()  U/L


 


Urine Color    Yellow  


 


Urine Appearance    Clear  (Clear)  


 


Urine pH    6.5  (5.0-8.0)  


 


Ur Specific Gravity    1.017  (1.001-1.035)  


 


Urine Protein    Trace H  (Negative)  


 


Urine Glucose (UA)    Negative  (Negative)  


 


Urine Ketones    1+ H  (Negative)  


 


Urine Blood    Negative  (Negative)  


 


Urine Nitrite    Negative  (Negative)  


 


Urine Bilirubin    Negative  (Negative)  


 


Urine Urobilinogen    <2.0  (<2.0)  mg/dL


 


Ur Leukocyte Esterase    Trace H  (Negative)  


 


Urine RBC    2  (0-5)  /hpf


 


Urine WBC    4  (0-5)  /hpf


 


Ur Squamous Epith Cells    1  (0-4)  /hpf


 


Urine Mucus    Rare H  (None)  /hpf














Disposition


Clinical Impression: 


 Gastroenteritis, Generalized weakness, Syncope





Disposition: ADMITTED AS IP TO THIS HOSP


Referrals: 


Ashwin Reynoso DO [Primary Care Provider] - 1-2 days


Time of Disposition: 00:37

## 2018-04-14 RX ADMIN — DORZOLAMIDE HYDROCHLORIDE AND TIMOLOL MALEATE SCH DROPS: 20; 5 SOLUTION/ DROPS OPHTHALMIC at 22:01

## 2018-04-14 RX ADMIN — BRIMONIDINE TARTRATE SCH DROPS: 2 SOLUTION/ DROPS OPHTHALMIC at 22:01

## 2018-04-14 RX ADMIN — IOPAMIDOL PRN ML: 612 INJECTION, SOLUTION INTRAVENOUS at 19:07

## 2018-04-14 RX ADMIN — IOPAMIDOL PRN ML: 612 INJECTION, SOLUTION INTRAVENOUS at 18:14

## 2018-04-14 NOTE — CT
EXAMINATION TYPE: CT abdomen pelvis w con

 

DATE OF EXAM: 4/14/2018

 

COMPARISON: Prior CT 1/16/2018

 

HISTORY: RLQ abd pain

 

CT DLP: 413.5 mGycm

Automated exposure control for dose reduction was used.

 

TECHNIQUE:  Helical acquisition of images from the lung bases through the pelvis have been completed.


 

CONTRAST: 

Performed with Oral Contrast and with IV Contrast, patient injected with 100ml mL of Isovue 300.

 

FINDINGS: There is a small hiatal hernia. Surgical clips are present in the pelvis

 

LUNG BASES: No significant abnormality is appreciated. Calcification present adjacent to the mediasti
num in the right lower lobe is stable. Minimal basilar atelectasis and effusion present on the left

 

AORTA:  No significant abnormality is appreciated.

 

LIVER/GB: Question some pericholecystic fluid. Liver shows low attenuation possibly due to hepatic st
eatosis

 

PANCREAS: No significant abnormality is seen.

 

SPLEEN: No significant abnormality is seen.

 

ADRENALS: No significant abnormality is seen.

 

KIDNEYS: Small cortical cysts are present within the kidneys as on prior

 

REPRODUCTIVE ORGANS: Not seen

 

BOWEL:  Some colonic wall thickening could be due to muscular hypertrophy, difficult to exclude colit
is or mucosal lesion especially in the sigmoid colon there is no bowel obstruction

 

FREE AIR:  No Free Air visible.

 

ASCITES:  Some fluid is present within the pelvis

 

PELVIC ADENOPATHY:  None visualized.

 

RETROPERITONEAL ADENOPATHY:  No Retroperitoneal Adenopathy visible.

 

URINARY BLADDER:  Not well distended, there is some wall thickening, possible cystocele, correlate to
 exclude cystitis

 

OSSEOUS STRUCTURES:  There is an underlying scoliosis. Degenerative disc changes are present in the v
isualized spine.

 

IMPRESSION: 

CORRELATE FOR POSSIBLE CHOLECYSTITIS. SMALL LEFT PLEURAL EFFUSION AND ASSOCIATED ATELECTASIS. SOME FR
EE FLUID PRESENT IN THE PELVIS. FINDINGS IN THE BOWEL IS DESCRIBED, CORRELATE FOR ENTERITIS, COLITIS.
 POSTOP CHANGES. FINDING IN THE BLADDER AS DESCRIBED.

## 2018-04-14 NOTE — P.HPIM
History of Present Illness


H&P Date: 18


Chief Complaint: vaso vagal syncope after diarrhea and nausea vomiting episode 





This is a 78-year-old pleasant lady patient of Dr. Reynoso she has 

underlying history off endometrial cancer in , neuropathy lower extremity, 

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 

mitral valve prolapse admitted to emergency room secondary to an episode of 

nausea vomiting and diarrhea starting at 3 PM.  Patient has periumbilical pain 

that started and stayed all night long, patient has previous episodes similar 

in  while , patient denies any melena or hematochezia, no hematemesis 

no hemoptysis, she had episodes of vomiting related to near syncopal event, 

prior to her ER transfer, patient has passed out for 10 seconds.





She had a colonoscopy done 3 years ago by Dr. Caro, no evidence of polyp 

at that time, she does have a family history of colon cancer, no recent 

antibiotics, however there was possibility and antibiotic exposure in 2018 

by Dr. Reynoso, unknown cause for the use of antibiotic.  Emergency room, 

patient has some leukocytosis, emergency room physician was thinking of a viral 

gastritis with vasovagal syncope





Review of old imaging show 2018 5 mm calculus lower pole right kidney non 

obstructing, no ascites, surgical clips in the pelvis, gallbladder appears 

normal, no mention on the appendix based on the last CT, there is a large bowel 

fluid consistent with diarrhea





On examination, patient's right lower quadrant is tender with no guarding, CAT 

scan of the abdomen and pelvis was ordered, consult with Dr. Mcnair, cannot rule 

out acute appendicitis knowing that there is no surgical documentation of a 

previous appendectomy with her GLADIS/BSO

















Review of Systems


Constitutional: Reports as per HPI, Denies anorexia, Denies chills, Denies 

chronic headaches, Denies chronic pain, Denies daytime sleepiness, Denies 

fatigue, Denies fever, Denies lethargy, Denies malaise, Denies night sweats, 

Denies poor appetite, Denies sweats, Denies weakness, Denies weight gain, 

Denies weight loss


Ears, nose, mouth and throat: Reports as per HPI, Denies ant. neck pain, Denies 

bleeding gums, Denies dental pain, Denies dysphagia, Denies epistaxis, Denies 

headache, Denies hoarseness, Denies mouth pain, Denies nasal congestion, Denies 

nasal discharge, Denies neck fullness/pressure, Denies neck lump, Denies nose 

pain, Denies odynophagia, Denies post-nasal drip, Denies sinus pain, Denies 

sinus pressure, Denies swelling in mouth, Denies swelling in throat, Denies 

sore throat, Denies vertigo, Denies voice changes


Cardiovascular: Reports as per HPI, Denies chest pain, Denies claudication, 

Denies decreased exercise tolerance, Denies dyspnea on exertion, Denies edema, 

Denies high blood pressure, Denies irregular heart beat, Denies leg edema, 

Denies lightheadedness, Denies orthopnea, Denies palpitations, Denies 

paroxysmal nocturnal dyspnea, Denies phlebitis, Denies rapid heart beat, Denies 

shortness of breath, Denies syncope


Respiratory: Reports as per HPI, Denies congestion, Denies cough, Denies cough 

with sputum, Denies dyspnea, Denies excessive sputum, Denies hemoptysis, Denies 

home oxygen, Denies pain, Denies pain on inspiration, Denies pleurisy, Denies 

respiratory infections, Denies sleep apnea, Denies snoring, Denies wheezing


Gastrointestinal: Reports as per HPI, Reports abdominal pain, Reports diarrhea, 

Reports nausea, Reports vomiting, Denies belching, Denies bloating, Denies BRBPR

, Denies change in bowel habits, Denies coffee ground emesis, Denies 

constipation, Denies dyspepsia, Denies early satiety, Denies excessive gas, 

Denies heartburn, Denies hematemesis, Denies hematochezia, Denies indigestion, 

Denies jaundice, Denies lactose intolerance, Denies loss of appetite, Denies 

melena


Genitourinary: Reports as per HPI, Reports kidney stones


Menstruation: Reports as per HPI, Reports post hysterectomy, Reports 

postmenopausal


Musculoskeletal: Reports as per HPI, Reports gait dysfunction, Reports shooting 

leg pain, Denies arm numbness/tingling, Denies atrophy, Denies fractures, 

Denies frequent falls, Denies hot joints, Denies leg numbness/tingling, Denies 

limitation of motion, Denies loss of height, Denies low back pain, Denies 

morning stiffness, Denies muscle cramps, Denies muscle weakness, Denies myalgias

, Denies neck pain, Denies neck stiffness, Denies prior amputations, Denies 

redness of joints, Denies shooting arm pain


Integumentary: Reports as per HPI, Denies acne, Denies boils, Denies brittle 

nails, Denies change in hair/nails, Denies color changes, Denies darkening of 

skin, Denies depigmentation, Denies dryness, Denies foot/leg ulcers, Denies 

growths, Denies hirsutism, Denies lesions, Denies onychomycosis, Denies pruritus

, Denies rash, Denies sores, Denies striae, Denies unusual bruising, Denies 

wounds


Neurological: Reports as per HPI, Reports weakness, Denies aphasia, Denies 

ataxia, Denies balance difficulties, Denies burning pain, Denies change in 

mentation, Denies change in smell/taste, Denies change in speech, Denies 

confusion, Denies convulsions, Denies double vision, Denies gait dysfunction, 

Denies head injury, Denies headaches, Denies hearing difficulties, Denies lack 

of coordination, Denies loss of vision, Denies memory loss, Denies migraines, 

Denies motor disturbance, Denies numbness, Denies paralysis, Denies paresthesias

, Denies seizures, Denies sensory deficit, Denies spasticity, Denies syncope, 

Denies tic, Denies tingling, Denies transient paralysis, Denies tremors, Denies 

vertigo, Denies visual changes


Psychiatric: Reports as per HPI, Denies anhedonia, Denies anxiety, Denies 

anxiety attacks, Denies change in appetite, Denies change in libido, Denies 

change in sleep habits, Denies confusion, Denies depression, Denies difficulty 

concentrating, Denies disorientation, Denies hallucinations, Denies hopelessness

, Denies hypersomnia, Denies insomnia, Denies irritability, Denies memory loss, 

Denies mood swings, Denies paranoia, Denies sadness/tearfulness, Denies sleep 

disturbances, Denies suicidal ideation


Endocrine: Reports as per HPI, Denies cold intolerance, Denies deepening of the 

voice, Denies excessive sweating, Denies excessive thirst, Denies fatigue, 

Denies flushing, Denies heat intolerance, Denies high blood sugars, Denies 

increase in ring/shoe/hat size, Denies low blood sugars, Denies nocturia, 

Denies palpitations, Denies polydipsia, Denies polyphagia, Denies polyuria, 

Denies proptosis, Denies recent glucocorticoid use, Denies thyroid mass, Denies 

weight change


Hematologic/Lymphatic: Reports as per HPI, Denies easy bleeding, Denies easy 

bruising, Denies lymphadenopathy, Denies lymphedema, Denies thrombophilia





Past Medical History


Past Medical History: Cancer (Endometrial cancer  with radiation treatment, 

injury to thecolon), Deep Vein Thrombosis (DVT), Hypertension, Osteoarthritis (

OA)


Additional Past Medical History / Comment(s): mitral valve prolapse.  blind


History of Any Multi-Drug Resistant Organisms: None Reported


Additional Past Surgical History / Comment(s): eye surgery, endometrial and 

cervicle CA with radiation


Past Anesthesia/Blood Transfusion Reactions: No Reported Reaction


Past Psychological History: No Psychological Hx Reported


Smoking Status: Never smoker


Past Alcohol Use History: None Reported


Past Drug Use History: None Reported





- Past Family History


  ** Mother


Family Medical History: Cancer (Colon cancer and breast cancer,), Renal Disease 

(3rd sister  with dialysis)


Additional Family Medical History / Comment(s): uterine CA





  ** Brother(s)


Family Medical History: Cancer (Colon cancer)


Additional Family Medical History / Comment(s): colon CA





  ** Sister(s)


Family Medical History: Cancer


Additional Family Medical History / Comment(s): colon and breast CA





  ** Father


Family Medical History: Myocardial Infarction (MI) (Age 54)


Additional Family Medical History / Comment(s):  at 54 from MI





Medications and Allergies


 Home Medications











 Medication  Instructions  Recorded  Confirmed  Type


 


Alendronate Sodium 70 mg PO BROWN 14 History


 


Docusate [Colace] 100 mg PO DAILY #30 capsule 18 Rx


 


Gabapentin [Neurontin] 300 mg PO HS 18 History


 


Aspirin [Adult Low Dose Aspirin EC] 81 mg PO HS 18 History


 


Brimonidine Tartrate [Alphagan P 1 drops BOTH EYES TID 18 History





0.2% Ophth Soln]    


 


Calcium Carbonate/Vitamin D3 1 tab PO BID 18 History





[Calcium 600-Vit D3 400 Caplet]    


 


Dorzolamide/Timolol/Pf [Cosopt Pf 1 applicator BOTH EYES TID 18 

History





2%/5% Ophth Droperette]    


 


Furosemide [Lasix] 20 mg PO DAILY PRN 18 History


 


Potassium 99 mg PO DAILY PRN 18 History











 Allergies











Allergy/AdvReac Type Severity Reaction Status Date / Time


 


ibuprofen [From Advil] AdvReac  Nausea & Verified 18 01:38





   Vomiting  














Physical Exam


Vitals: 


 Vital Signs











  Temp Pulse Pulse Pulse Pulse Pulse Resp


 


 18 16:00  99.1 F   72     16


 


 18 12:00  98.1 F   69     14


 


 18 08:00  98.0 F   71     14


 


 18 04:00     66    18


 


 18 03:58  98.1 F    66    18


 


 18 02:06        18


 


 18 01:20   78      16


 


 18 01:01  98.4 F    68    18


 


 18 00:00   76      15


 


 18 23:00   92      18


 


 18 22:37       92  18


 


 18 22:35      97   16


 


 18 22:32  99.8 F H  91   98    16


 


 18 22:22  100.8 F H      


 


 18 21:18  101.7 F H  92      18














  BP BP Pulse Ox


 


 18 16:00   140/50  94 L


 


 18 12:00   122/56  99


 


 18 08:00   123/44  97


 


 18 04:00   


 


 18 03:58   137/46  98


 


 18 02:06   


 


 18 01:20  146/80   100


 


 18 01:01   172/70  100


 


 18 00:00  146/90   95


 


 18 23:00  178/74   98


 


 18 22:37   202/82 


 


 18 22:35   217/107  100


 


 18 22:32  155/70  178/88  98


 


 18 22:22   


 


 18 21:18  196/81   99








 Intake and Output











 18





 06:59 14:59 22:59


 


Intake Total 1100  


 


Balance 1100  


 


Intake:   


 


  Intake, IV Titration 800  





  Amount   


 


    Sodium Chloride 0.9% 1, 800  





    000 ml @ 100 mls/hr IV .   





    Q10H ONE Rx#:[id number]   


 


  Oral 300  


 


Other:   


 


  Voiding Method Toilet Toilet 


 


  # Voids 2 4 


 


  Weight 59.1 kg  














- Constitutional


General appearance: average body habitus, cooperative, no acute distress (Weak 

looking)





- EENT


Eyes: anicteric sclerae, EOMI, PERRLA, dentition normal, normal appearance


ENT: hearing grossly normal, NA/AT, normal oropharynx





- Neck


Neck: no lymphadenopathy, normal ROM, no other, no rigidity, no stridor, no 

thyromegaly





- Respiratory


Respiratory: bilateral: CTA, negative: diminished, dullness, rales, rhonchi, 

wheezing, prolonged expiration, prolonged inspiration





- Cardiovascular


Rhythm: regular





- Gastrointestinal


General gastrointestinal: decreased bowel sounds, soft


Localized gastrointestinal: tender: RLQ





- Integumentary


Integumentary: decreased turgor, normal





- Neurologic


Neurologic: CNII-XII intact





- Musculoskeletal


Musculoskeletal: generalized weakness, strength equal bilaterally





- Psychiatric


Psychiatric: A&O x's 3, appropriate affect, intact judgment & insight





Results


CBC & Chem 7: 


 18 21:30





 18 21:30


Labs: 


 Abnormal Lab Results - Last 24 Hours (Table)











  18 Range/Units





  21:15 21:30 21:30 


 


WBC    14.5 H  (3.8-10.6)  k/uL


 


Neutrophils #    13.0 H  (1.3-7.7)  k/uL


 


Lymphocytes #    0.5 L  (1.0-4.8)  k/uL


 


Sodium   148 H   (137-145)  mmol/L


 


BUN   20 H   (7-17)  mg/dL


 


Glucose   152 H   (74-99)  mg/dL


 


POC Glucose (mg/dL)  122 H    (75-99)  mg/dL


 


Plasma Lactic Acid Zia     (0.7-2.0)  mmol/L


 


Urine Protein     (Negative)  


 


Urine Ketones     (Negative)  


 


Ur Leukocyte Esterase     (Negative)  


 


Urine Mucus     (None)  /hpf














  18 Range/Units





  21:30 22:15 


 


WBC    (3.8-10.6)  k/uL


 


Neutrophils #    (1.3-7.7)  k/uL


 


Lymphocytes #    (1.0-4.8)  k/uL


 


Sodium    (137-145)  mmol/L


 


BUN    (7-17)  mg/dL


 


Glucose    (74-99)  mg/dL


 


POC Glucose (mg/dL)    (75-99)  mg/dL


 


Plasma Lactic Acid Zia  3.7 H*   (0.7-2.0)  mmol/L


 


Urine Protein   Trace H  (Negative)  


 


Urine Ketones   1+ H  (Negative)  


 


Ur Leukocyte Esterase   Trace H  (Negative)  


 


Urine Mucus   Rare H  (None)  /hpf








 Laboratory Results











WBC  14.5 k/uL (3.8-10.6)  H  18  21:30    


 


RBC  5.12 m/uL (3.80-5.40)   18  21:    


 


Hgb  14.0 gm/dL (11.4-16.0)   18:    


 


Hct  43.8 % (34.0-46.0)   18  21:    


 


MCV  85.5 fL (80.0-100.0)   18:    


 


MCH  27.4 pg (25.0-35.0)   18  21:    


 


MCHC  32.1 g/dL (31.0-37.0)   18:    


 


RDW  15.5 % (11.5-15.5)   18:    


 


Plt Count  274 k/uL (150-450)   18  21:30    


 


Neutrophils %  90 %  18  21:    


 


Lymphocytes %  3 %  18  21:    


 


Monocytes %  5 %  18  21:    


 


Eosinophils %  1 %  18  21:    


 


Basophils %  0 %  18  21:30    


 


Neutrophils #  13.0 k/uL (1.3-7.7)  H  18  21:    


 


Lymphocytes #  0.5 k/uL (1.0-4.8)  L  18:    


 


Monocytes #  0.7 k/uL (0-1.0)   18  21:    


 


Eosinophils #  0.2 k/uL (0-0.7)   18  21:    


 


Basophils #  0.0 k/uL (0-0.2)   18  21:    


 


Hypochromasia  Slight   04/13/18  21:30    


 


Sodium  148 mmol/L (137-145)  H  18  21:30    


 


Potassium  3.6 mmol/L (3.5-5.1)   18  21:30    


 


Chloride  102 mmol/L ()   18  21:30    


 


Carbon Dioxide  28 mmol/L (22-30)   18  21:30    


 


Anion Gap  18 mmol/L  18  21:30    


 


BUN  20 mg/dL (7-17)  H  18  21:30    


 


Creatinine  0.80 mg/dL (0.52-1.04)   18  21:30    


 


Est GFR (CKD-EPI)AfAm  82  (>60 ml/min/1.73 sqM)   18  21:30    


 


Est GFR (CKD-EPI)NonAf  72  (>60 ml/min/1.73 sqM)   18  21:30    


 


Glucose  152 mg/dL (74-99)  H  18  21:30    


 


POC Glucose (mg/dL)  122 mg/dL (75-99)  H  18  21:15    


 


POC Glu Operater ID  Agustín Harris   18  21:15    


 


Lactic Ac Sepsis Rflx  Y   18  22:04    


 


Plasma Lactic Acid Zia  2.0 mmol/L (0.7-2.0)   18  01:15    


 


Calcium  10.1 mg/dL (8.4-10.2)   18  21:30    


 


Total Bilirubin  0.6 mg/dL (0.2-1.3)   18  21:30    


 


AST  31 U/L (14-36)   18  21:30    


 


ALT  31 U/L (9-52)   18  21:30    


 


Alkaline Phosphatase  102 U/L ()   18  21:30    


 


Total Creatine Kinase  32 U/L ()   18  21:30    


 


CK-MB (CK-2)  0.8 ng/mL (0.0-2.4)   18  21:30    


 


CK-MB (CK-2) Rel Index  2.5   18  21:30    


 


Troponin I  0.017 ng/mL (0.000-0.034)   18  09:34    


 


Total Protein  7.4 g/dL (6.3-8.2)   18  21:30    


 


Albumin  4.1 g/dL (3.5-5.0)   18  21:30    


 


Amylase  71 U/L ()   18  21:30    


 


Lipase  102 U/L ()   18  21:30    


 


Urine Color  Yellow   18  22:15    


 


Urine Appearance  Clear  (Clear)   18  22:15    


 


Urine pH  6.5  (5.0-8.0)   18  22:15    


 


Ur Specific Gravity  1.017  (1.001-1.035)   18  22:15    


 


Urine Protein  Trace  (Negative)  H  18  22:15    


 


Urine Glucose (UA)  Negative  (Negative)   18  22:15    


 


Urine Ketones  1+  (Negative)  H  18  22:15    


 


Urine Blood  Negative  (Negative)   18  22:15    


 


Urine Nitrite  Negative  (Negative)   18  22:15    


 


Urine Bilirubin  Negative  (Negative)   18  22:15    


 


Urine Urobilinogen  <2.0 mg/dL (<2.0)   18  22:15    


 


Ur Leukocyte Esterase  Trace  (Negative)  H  18  22:15    


 


Urine RBC  2 /hpf (0-5)   18  22:15    


 


Urine WBC  4 /hpf (0-5)   18  22:15    


 


Ur Squamous Epith Cells  1 /hpf (0-4)   18  22:15    


 


Urine Mucus  Rare /hpf (None)  H  18  22:15    


 


Influenza Type A RNA  Not Detected  (Not Detectd)   18  02:18    


 


Influenza Type B (PCR)  Not Detected  (Not Detectd)   18  02:18    














Thrombosis Risk Factor Assmnt





- DVT/VTE Prophylaxis


DVT/VTE Prophylaxis: Pharmacologic Prophylaxis ordered, Mechanical Prophylaxis 

ordered





- Choose All That Apply


Each Risk Factor Represents 3 Points: Age 75 years or older, History of DVT/PE


Thrombosis Risk Factor Assessment Total Risk Factor Score: 6


Thrombosis Risk Factor Assessment Level: High Risk





Assessment and Plan


Plan: 





1.  Vasovagal syncope secondary to hypovolemia, as enteritis with GI losses, 

patient will be hydrated with IV 0.9 saline 100 mL an hour, Lasix currently is 

on hold secondary to dehydration, labs monitored for thesufficiency and kidney 

failure, Colace on hold secondary diarrhea





2.  Abdominal pain with right lower quadrant tenderness, unsure whether 

appendix is still present or not based on previous history off GLADIS/BSO from 

endometrial cancer requiring prophylactic appendectomy, however CAT scan did 

not confirm in the past presence off the appendix, CAT scan of the appendix 

will be ordered this time with general surgery consult Dr. Osuna.  One time 

dose dose of Zosyn on the CAT scan will be back, patient has leukocytosis 

negative for lipase negative for pyuria or hematuria





3.  Leukocytosis, possibly acute phase reactant treatment as above, rule out 

infectious causes and inflammatory causes causes including viral as well as 

appendicitis





4.  Diarrhea possibly viral, C. diff toxin to be evaluated, stool WBCs, stool 

Hemoccult





5.  Mild azotemia secondary to hypoxic for anemia IV fluids, hold Lasix, 

monitor labs





6.  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 

last bout was in general wife 2018, she has about 2 episodes per year, not as 

severe as this





7.  Neuropathy on gabapentin 300 mg at bedtime





8.  Hypernatremia, secondary to ongoing GI losses, IV fluid might need to be 

changed to D5 7 instead of 0.9 IV





9.  Ileus Secondary to Dehydration, Lactic Acidosis Has Improved

## 2018-04-15 VITALS — HEART RATE: 79 BPM | DIASTOLIC BLOOD PRESSURE: 90 MMHG | SYSTOLIC BLOOD PRESSURE: 173 MMHG

## 2018-04-15 VITALS — RESPIRATION RATE: 16 BRPM | TEMPERATURE: 98.4 F

## 2018-04-15 LAB
ALBUMIN SERPL-MCNC: 3 G/DL (ref 3.5–5)
ALP SERPL-CCNC: 74 U/L (ref 38–126)
ALT SERPL-CCNC: 35 U/L (ref 9–52)
ANION GAP SERPL CALC-SCNC: 12 MMOL/L
AST SERPL-CCNC: 24 U/L (ref 14–36)
BASOPHILS # BLD AUTO: 0 K/UL (ref 0–0.2)
BASOPHILS NFR BLD AUTO: 0 %
BUN SERPL-SCNC: 13 MG/DL (ref 7–17)
CALCIUM SPEC-MCNC: 8 MG/DL (ref 8.4–10.2)
CHLORIDE SERPL-SCNC: 106 MMOL/L (ref 98–107)
CO2 SERPL-SCNC: 25 MMOL/L (ref 22–30)
EOSINOPHIL # BLD AUTO: 0.1 K/UL (ref 0–0.7)
EOSINOPHIL NFR BLD AUTO: 1 %
ERYTHROCYTE [DISTWIDTH] IN BLOOD BY AUTOMATED COUNT: 4 M/UL (ref 3.8–5.4)
ERYTHROCYTE [DISTWIDTH] IN BLOOD: 15.8 % (ref 11.5–15.5)
GLUCOSE SERPL-MCNC: 70 MG/DL (ref 74–99)
HCT VFR BLD AUTO: 35.4 % (ref 34–46)
HGB BLD-MCNC: 10.9 GM/DL (ref 11.4–16)
LYMPHOCYTES # SPEC AUTO: 1.3 K/UL (ref 1–4.8)
LYMPHOCYTES NFR SPEC AUTO: 19 %
MCH RBC QN AUTO: 27.2 PG (ref 25–35)
MCHC RBC AUTO-ENTMCNC: 30.7 G/DL (ref 31–37)
MCV RBC AUTO: 88.6 FL (ref 80–100)
MONOCYTES # BLD AUTO: 0.4 K/UL (ref 0–1)
MONOCYTES NFR BLD AUTO: 5 %
NEUTROPHILS # BLD AUTO: 4.9 K/UL (ref 1.3–7.7)
NEUTROPHILS NFR BLD AUTO: 72 %
PLATELET # BLD AUTO: 217 K/UL (ref 150–450)
POTASSIUM SERPL-SCNC: 3.2 MMOL/L (ref 3.5–5.1)
PROT SERPL-MCNC: 5.8 G/DL (ref 6.3–8.2)
SODIUM SERPL-SCNC: 143 MMOL/L (ref 137–145)
WBC # BLD AUTO: 6.8 K/UL (ref 3.8–10.6)

## 2018-04-15 RX ADMIN — DORZOLAMIDE HYDROCHLORIDE AND TIMOLOL MALEATE SCH DROPS: 20; 5 SOLUTION/ DROPS OPHTHALMIC at 09:30

## 2018-04-15 RX ADMIN — BRIMONIDINE TARTRATE SCH DROPS: 2 SOLUTION/ DROPS OPHTHALMIC at 09:30

## 2018-04-15 NOTE — P.GSCN
History of Present Illness


Consult date: 04/15/18


Reason for Consult: 


Abdominal Pain, nausea, vomiting


History of present illness: 





The patient's a 78-year-old female presented to the emergency department with a 

syncopal episode yesterday.  She had been having some episodes of nausea, 

vomiting, diarrhea since Friday night.  It was worse yesterday and she got 

lightheaded and passed out so came into the emergency department.  She's 

feeling much better today.  She had 1 loose stool this morning.  She is 

tolerating a diet.  She hasn't had anything like this in the past.  Denies 

blood in the stool or dark tarry stool.  Denies fever or chills.  Admits to a 

sore throat and hoarse voice.  She did have a colonoscopy done about 3 years 

ago by Dr. Caro.  She thinks it was unremarkable.





Review of Systems





- Constitutional


Reports as per HPI





Past Medical History


Past Medical History: Cancer (Endometrial cancer  with radiation treatment, 

injury to thecolon), Deep Vein Thrombosis (DVT), Hypertension, Osteoarthritis (

OA)


Additional Past Medical History / Comment(s): mitral valve prolapse.  blind


History of Any Multi-Drug Resistant Organisms: None Reported


Additional Past Surgical History / Comment(s): eye surgery, endometrial and 

cervicle CA with radiation


Past Anesthesia/Blood Transfusion Reactions: No Reported Reaction


Past Psychological History: No Psychological Hx Reported


Smoking Status: Never smoker


Past Alcohol Use History: None Reported


Past Drug Use History: None Reported





- Past Family History


  ** Mother


Family Medical History: Cancer (Colon cancer and breast cancer,), Renal Disease 

(3rd sister  with dialysis)


Additional Family Medical History / Comment(s): uterine CA





  ** Brother(s)


Family Medical History: Cancer (Colon cancer)


Additional Family Medical History / Comment(s): colon CA





  ** Sister(s)


Family Medical History: Cancer


Additional Family Medical History / Comment(s): colon and breast CA





  ** Father


Family Medical History: Myocardial Infarction (MI) (Age 54)


Additional Family Medical History / Comment(s):  at 54 from MI





Medications and Allergies


 Home Medications











 Medication  Instructions  Recorded  Confirmed  Type


 


Alendronate Sodium 70 mg PO BROWN 14 History


 


Docusate [Colace] 100 mg PO DAILY #30 capsule 18 Rx


 


Gabapentin [Neurontin] 300 mg PO HS 18 History


 


Aspirin [Adult Low Dose Aspirin EC] 81 mg PO HS 18 History


 


Brimonidine Tartrate [Alphagan P 1 drops BOTH EYES TID 18 History





0.2% Ophth Soln]    


 


Calcium Carbonate/Vitamin D3 1 tab PO BID 18 History





[Calcium 600-Vit D3 400 Caplet]    


 


Dorzolamide/Timolol/Pf [Cosopt Pf 1 applicator BOTH EYES TID 18 

History





2%/5% Ophth Droperette]    


 


Furosemide [Lasix] 20 mg PO DAILY PRN 18 History


 


Potassium 99 mg PO DAILY PRN 18 History











 Allergies











Allergy/AdvReac Type Severity Reaction Status Date / Time


 


ibuprofen [From Advil] AdvReac  Nausea & Verified 18 01:38





   Vomiting  














Surgical - Exam


Osteopathic Statement: *.  No significant issues noted on an osteopathic 

structural exam other than those noted in the History and Physical/Consult.


 Vital Signs











Temp Pulse Resp BP Pulse Ox


 


 101.7 F H  92   18   196/81   99 


 


 18 21:18  18 21:18  18 21:18  18 21:18  18 21:18














- General


well developed, well nourished, no distress





- Eyes


normal ocular movement





- ENT


normal mucosa





- Neck


trachea midline, no lymphadectomy





- Respiratory


normal respiratory effort, clear to auscultation





- Cardiovascular


Rhythm: regular





- Abdomen


Abdomen: soft, non tender, bowel sounds, no guarding, no rigid, no rebound, no 

distended





Results





- Labs





 04/15/18 06:12





 04/15/18 06:12


 Abnormal Lab Results - Last 24 Hours (Table)











  04/14/18 04/15/18 04/15/18 Range/Units





  20:30 06:12 06:12 


 


Hgb   10.9 L D   (11.4-16.0)  gm/dL


 


MCHC   30.7 L   (31.0-37.0)  g/dL


 


RDW   15.8 H   (11.5-15.5)  %


 


Potassium    3.2 L  (3.5-5.1)  mmol/L


 


Glucose    70 L  (74-99)  mg/dL


 


Calcium    8.0 L  (8.4-10.2)  mg/dL


 


Total Protein    5.8 L  (6.3-8.2)  g/dL


 


Albumin    3.0 L  (3.5-5.0)  g/dL


 


Stool Occult Blood  Positive H    (Negative)  








 Microbiology - Last 24 Hours (Table)











 18 21:30 Blood Culture - Preliminary





 Blood    No Growth after 24 hours








 Diabetes panel











  04/15/18 Range/Units





  06:12 


 


Sodium  143  (137-145)  mmol/L


 


Potassium  3.2 L  (3.5-5.1)  mmol/L


 


Chloride  106  ()  mmol/L


 


Carbon Dioxide  25  (22-30)  mmol/L


 


BUN  13  (7-17)  mg/dL


 


Creatinine  0.76  (0.52-1.04)  mg/dL


 


Glucose  70 L  (74-99)  mg/dL


 


Calcium  8.0 L  (8.4-10.2)  mg/dL


 


AST  24  (14-36)  U/L


 


ALT  35  (9-52)  U/L


 


Alkaline Phosphatase  74  ()  U/L


 


Total Protein  5.8 L  (6.3-8.2)  g/dL


 


Albumin  3.0 L  (3.5-5.0)  g/dL








 Calcium panel











  04/15/18 Range/Units





  06:12 


 


Calcium  8.0 L  (8.4-10.2)  mg/dL


 


Albumin  3.0 L  (3.5-5.0)  g/dL








 Pituitary panel











  04/15/18 Range/Units





  06:12 


 


Sodium  143  (137-145)  mmol/L


 


Potassium  3.2 L  (3.5-5.1)  mmol/L


 


Chloride  106  ()  mmol/L


 


Carbon Dioxide  25  (22-30)  mmol/L


 


BUN  13  (7-17)  mg/dL


 


Creatinine  0.76  (0.52-1.04)  mg/dL


 


Glucose  70 L  (74-99)  mg/dL


 


Calcium  8.0 L  (8.4-10.2)  mg/dL








 Adrenal panel











  04/15/18 Range/Units





  06:12 


 


Sodium  143  (137-145)  mmol/L


 


Potassium  3.2 L  (3.5-5.1)  mmol/L


 


Chloride  106  ()  mmol/L


 


Carbon Dioxide  25  (22-30)  mmol/L


 


BUN  13  (7-17)  mg/dL


 


Creatinine  0.76  (0.52-1.04)  mg/dL


 


Glucose  70 L  (74-99)  mg/dL


 


Calcium  8.0 L  (8.4-10.2)  mg/dL


 


Total Bilirubin  0.6  (0.2-1.3)  mg/dL


 


AST  24  (14-36)  U/L


 


ALT  35  (9-52)  U/L


 


Alkaline Phosphatase  74  ()  U/L


 


Total Protein  5.8 L  (6.3-8.2)  g/dL


 


Albumin  3.0 L  (3.5-5.0)  g/dL














- Imaging


CT scan - abdomen: report reviewed, image reviewed





Assessment and Plan


(1) Nausea and vomiting


Current Visit: Yes   Status: Acute   Code(s): R11.2 - NAUSEA WITH VOMITING, 

UNSPECIFIED   SNOMED Code(s): 63228216


   





(2) Diarrhea


Current Visit: Yes   Status: Acute   Code(s): R19.7 - DIARRHEA, UNSPECIFIED   

SNOMED Code(s): 25485058


   





(3) Positive occult stool blood test


Current Visit: Yes   Status: Acute   Code(s): R19.5 - OTHER FECAL ABNORMALITIES

   SNOMED Code(s): 16799399


   





(4) Gastroenteritis


Current Visit: Yes   Status: Acute   Code(s): K52.9 - NONINFECTIVE 

GASTROENTERITIS AND COLITIS, UNSPECIFIED   SNOMED Code(s): 81727196


   


Plan: 





This seems to be self-limited viral gastroenteritis.  SHe was Hemoccult 

positive but has been having the diarrhea and vomiting.  I recommend that be 

checked as an outpatient in 2-4 weeks.  We'll check her chart in the office 

tomorrow to see when the last colonoscopy and family history.  Currently 

nonsurgical.

## 2018-04-15 NOTE — US
EXAMINATION TYPE: US venous doppler duplex LE 

 

DATE OF EXAM: 4/15/2018 8:21 AM

 

COMPARISON: US 2017

 

CLINICAL HISTORY: 78-year-old female calf pain, high risk. Bilateral calf pain and swelling, history 
of right leg DVT 2015, not on blood thinners

 

SIDE PERFORMED: Bilateral  

 

TECHNIQUE:  The lower extremity deep venous system is examined utilizing real time linear array sonog
shreya with graded compression, doppler sonography and color-flow sonography.

 

FINDINGS:

 

VESSELS IMAGED:

External Iliac Vein (EIV)

Common Femoral Vein

Deep Femoral Vein

Greater Saphenous Vein *

Femoral Vein

Popliteal Vein

Small Saphenous Vein *

Proximal Calf Veins

(* superficial vessels)

 

 

 

Right Leg:  Appears negative for DVT

 

Left Leg:  Appears negative for DVT

 

 

 

IMPRESSION:

No evidence for DVT within the bilateral lower extremities imaged from the groin to the upper calves.

## 2018-04-29 NOTE — P.DS
Providers


Date of admission: 


04/14/18 00:37





Attending physician: 


Deborah Cannon





Consults: 





 





04/14/18 19:57


Consult Physician Routine 


   Consulting Provider: Karlie Osuna


   Consult Reason/Comments: right lower quadrant abdominal pain tenderness


   Do you want consulting provider notified?: Yes











Primary care physician: 


Ashwin Reynoso





[facility] Course: 





History of Present Illness


H&P Date: 04/14/18


Chief Complaint: vaso vagal syncope after diarrhea and nausea vomiting episode 





This is a 78-year-old pleasant lady patient of Dr. Reynoso she has 

underlying history off endometrial cancer in 2010, neuropathy lower extremity, 

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 

mitral valve prolapse admitted to emergency room secondary to an episode of 

nausea vomiting and diarrhea starting at 3 PM.  Patient has periumbilical pain 

that started and stayed all night long, patient has previous episodes similar 

in June while of 29, patient denies any melena or hematochezia, no hematemesis 

no hemoptysis, she had episodes of vomiting related to near syncopal event, 

prior to her ER transfer, patient has passed out for 10 seconds.





She had a colonoscopy done 3 years ago by Dr. Caro, no evidence of polyp 

at that time, she does have a family history of colon cancer, no recent 

antibiotics, however there was possibility and antibiotic exposure in July 2018 

by Dr. Reynoso, unknown cause for the use of antibiotic.  Emergency room, 

patient has some leukocytosis, emergency room physician was thinking of a viral 

gastritis with vasovagal syncope





Review of old imaging show 1/2018 5 mm calculus lower pole right kidney non 

obstructing, no ascites, surgical clips in the pelvis, gallbladder appears 

normal, no mention on the appendix based on the last CT, there is a large bowel 

fluid consistent with diarrhea





On examination, patient's right lower quadrant is tender with no guarding, CAT 

scan of the abdomen and pelvis was ordered, consult with Dr. Mcnair, cannot rule 

out acute appendicitis knowing that there is no surgical documentation of a 

previous appendectomy with her GLADIS/BSO





4/15, patient is improving, thoughts of symptomatic radiation colitis and is 

tolerating diet














Assessment and Plan


Plan: 





1.  Vasovagal syncope secondary to hypovolemia, as enteritis with GI losses, 

radiation colitis.


2.  Abdominal pain with right lower quadrant tenderness, doubt acute 

appendicitis , ct imaging did not confirm presence of appendix, improved on 

discharge


3.  Leukocytosis, suspect reative, improved on discharge


4.  Diarrhea possibly viral, C. diff toxin negative


5.  Mild azotemia secondary to hypovolemia


6.  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


7.  Neuropathy,stable


8.  Hypernatremia, secondary to vonlume contruction gi loses


9.  Ileus with gastroenteritis Secondary to Dehydration, 


10 Lactic Acidosis Has Improved











Discharge Medication List





Alendronate Sodium 70 mg PO BROWN 11/25/14 [History]


Gabapentin [Neurontin] 300 mg PO HS 01/16/18 [History]


Aspirin [Adult Low Dose Aspirin EC] 81 mg PO HS 04/13/18 [History]


Brimonidine Tartrate [Alphagan P 0.2% Ophth Soln] 1 drops BOTH EYES TID 04/13/ 18 [History]


Calcium Carbonate/Vitamin D3 [Calcium 600-Vit D3 400 Caplet] 1 tab PO BID 04/13/ 18 [History]


Dorzolamide/Timolol/Pf [Cosopt Pf 2%/5% Ophth Droperette] 1 applicator BOTH 

EYES TID 04/13/18 [History]


Furosemide [Lasix] 20 mg PO DAILY PRN 04/13/18 [History]


Potassium 99 mg PO DAILY PRN 04/13/18 [History]


Fluticasone Nasal Spray [Flonase Nasal Spray] 1 spray EA NOSTRIL BID #1 bottle 

04/15/18 [Rx]


Psyllium Husk 100% [Metamucil Packet] 1 packet PO DAILY PRN #30 packet 04/15/18 

[Rx]














Plan - Discharge Summary


Discharge Rx Participant: No


New Discharge Prescriptions: 


New


   Fluticasone Nasal Spray [Flonase Nasal Spray] 1 spray EA NOSTRIL BID #1 

bottle


   Psyllium Husk 100% [Metamucil Packet] 1 packet PO DAILY PRN #30 packet


     PRN Reason: Constipation





Continue


   Alendronate Sodium 70 mg PO BROWN


   Gabapentin [Neurontin] 300 mg PO HS


   Brimonidine Tartrate [Alphagan P 0.2% Ophth Soln] 1 drops BOTH EYES TID


   Furosemide [Lasix] 20 mg PO DAILY PRN


     PRN Reason: Edema


   Potassium 99 mg PO DAILY PRN


     PRN Reason: Edema


   Dorzolamide/Timolol/Pf [Cosopt Pf 2%/5% Ophth Droperette] 1 applicator BOTH 

EYES TID


   Calcium Carbonate/Vitamin D3 [Calcium 600-Vit D3 400 Caplet] 1 tab PO BID


   Aspirin [Adult Low Dose Aspirin EC] 81 mg PO HS





Discontinued


   Docusate [Colace] 100 mg PO DAILY #30 capsule


Discharge Medication List





Alendronate Sodium 70 mg PO BROWN 11/25/14 [History]


Gabapentin [Neurontin] 300 mg PO HS 01/16/18 [History]


Aspirin [Adult Low Dose Aspirin EC] 81 mg PO HS 04/13/18 [History]


Brimonidine Tartrate [Alphagan P 0.2% Ophth Soln] 1 drops BOTH EYES TID 04/13/ 18 [History]


Calcium Carbonate/Vitamin D3 [Calcium 600-Vit D3 400 Caplet] 1 tab PO BID 04/13/ 18 [History]


Dorzolamide/Timolol/Pf [Cosopt Pf 2%/5% Ophth Droperette] 1 applicator BOTH 

EYES TID 04/13/18 [History]


Furosemide [Lasix] 20 mg PO DAILY PRN 04/13/18 [History]


Potassium 99 mg PO DAILY PRN 04/13/18 [History]


Fluticasone Nasal Spray [Flonase Nasal Spray] 1 spray EA NOSTRIL BID #1 bottle 

04/15/18 [Rx]


Psyllium Husk 100% [Metamucil Packet] 1 packet PO DAILY PRN #30 packet 04/15/18 

[Rx]








Follow up Appointment(s)/Referral(s): 


Karlie Osuna DO [Doctor of Osteopathic Medicine] - 2 Weeks


Ashwin Reynoso DO [Primary Care Provider] - 1-2 days


Discharge Disposition: HOME SELF-CARE

## 2018-04-30 ENCOUNTER — HOSPITAL ENCOUNTER (OUTPATIENT)
Dept: HOSPITAL 47 - RADNMMAIN | Age: 78
Discharge: HOME | End: 2018-04-30
Payer: MEDICARE

## 2018-04-30 DIAGNOSIS — K81.0: Primary | ICD-10-CM

## 2018-04-30 PROCEDURE — 78226 HEPATOBILIARY SYSTEM IMAGING: CPT

## 2018-05-01 NOTE — NM
EXAMINATION TYPE: NM hepatobiliary w EF

 

DATE OF EXAM: 5/1/2018

 

COMPARISON: CT abdomen and pelvis April 14, 2018

 

HISTORY: Acute cholecystitis per order. Abdominal pain with heartburn and nausea per patient.

 

TECHNIQUE: After the intravenous administration of 5.2 mCi Tc 99m Mebrofenin hepatobiliary scintigrap
hy is performed.  Immediate images post injection.

 

FINDINGS: 

There is poor heterogeneous initial accumulation of tracer by the liver with marked diminished uptake
 left hepatic lobe.  The gallbladder is visualized within 30 minutes.  The small bowel activity is no
hector within 50 minutes.  At one hour 8 ounces of oral ensure plus is given to mimic CCK and gallbladde
r ejection fraction is calculated at 93 %, not deviated from the normal range.  Therefore there is no
 scintigraphic evidence of cystic or common bile duct obstruction to suggest acute cholecystitis or g
allbladder dyskinesia. Patient did not have symptoms of increased pain upon drinking  oral Essure.

 

IMPRESSION: Ejection fraction is not deviated from the normal range.

## 2018-05-31 ENCOUNTER — HOSPITAL ENCOUNTER (INPATIENT)
Dept: HOSPITAL 47 - EC | Age: 78
LOS: 2 days | Discharge: HOME | DRG: 247 | End: 2018-06-02
Payer: MEDICARE

## 2018-05-31 VITALS — BODY MASS INDEX: 21.7 KG/M2

## 2018-05-31 DIAGNOSIS — Z80.3: ICD-10-CM

## 2018-05-31 DIAGNOSIS — Z79.83: ICD-10-CM

## 2018-05-31 DIAGNOSIS — Z88.6: ICD-10-CM

## 2018-05-31 DIAGNOSIS — Z79.02: ICD-10-CM

## 2018-05-31 DIAGNOSIS — E78.5: ICD-10-CM

## 2018-05-31 DIAGNOSIS — I34.1: ICD-10-CM

## 2018-05-31 DIAGNOSIS — Z79.82: ICD-10-CM

## 2018-05-31 DIAGNOSIS — Z86.718: ICD-10-CM

## 2018-05-31 DIAGNOSIS — Z80.0: ICD-10-CM

## 2018-05-31 DIAGNOSIS — Z92.3: ICD-10-CM

## 2018-05-31 DIAGNOSIS — Z82.49: ICD-10-CM

## 2018-05-31 DIAGNOSIS — M19.90: ICD-10-CM

## 2018-05-31 DIAGNOSIS — Z85.42: ICD-10-CM

## 2018-05-31 DIAGNOSIS — Z90.710: ICD-10-CM

## 2018-05-31 DIAGNOSIS — I11.9: ICD-10-CM

## 2018-05-31 DIAGNOSIS — Z79.899: ICD-10-CM

## 2018-05-31 DIAGNOSIS — I21.4: Primary | ICD-10-CM

## 2018-05-31 DIAGNOSIS — H54.8: ICD-10-CM

## 2018-05-31 DIAGNOSIS — I25.10: ICD-10-CM

## 2018-05-31 DIAGNOSIS — G62.9: ICD-10-CM

## 2018-05-31 DIAGNOSIS — H40.9: ICD-10-CM

## 2018-05-31 DIAGNOSIS — Z80.49: ICD-10-CM

## 2018-05-31 DIAGNOSIS — Z84.1: ICD-10-CM

## 2018-05-31 PROCEDURE — 85379 FIBRIN DEGRADATION QUANT: CPT

## 2018-05-31 PROCEDURE — 80061 LIPID PANEL: CPT

## 2018-05-31 PROCEDURE — 82550 ASSAY OF CK (CPK): CPT

## 2018-05-31 PROCEDURE — 71275 CT ANGIOGRAPHY CHEST: CPT

## 2018-05-31 PROCEDURE — 36415 COLL VENOUS BLD VENIPUNCTURE: CPT

## 2018-05-31 PROCEDURE — 84484 ASSAY OF TROPONIN QUANT: CPT

## 2018-05-31 PROCEDURE — 83690 ASSAY OF LIPASE: CPT

## 2018-05-31 PROCEDURE — 93458 L HRT ARTERY/VENTRICLE ANGIO: CPT

## 2018-05-31 PROCEDURE — 94760 N-INVAS EAR/PLS OXIMETRY 1: CPT

## 2018-05-31 PROCEDURE — 85730 THROMBOPLASTIN TIME PARTIAL: CPT

## 2018-05-31 PROCEDURE — 85025 COMPLETE CBC W/AUTO DIFF WBC: CPT

## 2018-05-31 PROCEDURE — 93005 ELECTROCARDIOGRAM TRACING: CPT

## 2018-05-31 PROCEDURE — 85610 PROTHROMBIN TIME: CPT

## 2018-05-31 PROCEDURE — 93306 TTE W/DOPPLER COMPLETE: CPT

## 2018-05-31 PROCEDURE — 82150 ASSAY OF AMYLASE: CPT

## 2018-05-31 PROCEDURE — 71046 X-RAY EXAM CHEST 2 VIEWS: CPT

## 2018-05-31 PROCEDURE — 96376 TX/PRO/DX INJ SAME DRUG ADON: CPT

## 2018-05-31 PROCEDURE — 96375 TX/PRO/DX INJ NEW DRUG ADDON: CPT

## 2018-05-31 PROCEDURE — 80053 COMPREHEN METABOLIC PANEL: CPT

## 2018-05-31 PROCEDURE — 85347 COAGULATION TIME ACTIVATED: CPT

## 2018-05-31 PROCEDURE — 82553 CREATINE MB FRACTION: CPT

## 2018-05-31 PROCEDURE — 99291 CRITICAL CARE FIRST HOUR: CPT

## 2018-05-31 PROCEDURE — 80048 BASIC METABOLIC PNL TOTAL CA: CPT

## 2018-05-31 PROCEDURE — 83880 ASSAY OF NATRIURETIC PEPTIDE: CPT

## 2018-05-31 PROCEDURE — 83735 ASSAY OF MAGNESIUM: CPT

## 2018-05-31 PROCEDURE — 96374 THER/PROPH/DIAG INJ IV PUSH: CPT

## 2018-06-01 LAB
ALBUMIN SERPL-MCNC: 3.8 G/DL (ref 3.5–5)
ALP SERPL-CCNC: 78 U/L (ref 38–126)
ALT SERPL-CCNC: 28 U/L (ref 9–52)
AMYLASE SERPL-CCNC: 63 U/L (ref 30–110)
ANION GAP SERPL CALC-SCNC: 15 MMOL/L
APTT BLD: 20.8 SEC (ref 22–30)
AST SERPL-CCNC: 24 U/L (ref 14–36)
BASOPHILS # BLD AUTO: 0 K/UL (ref 0–0.2)
BASOPHILS NFR BLD AUTO: 0 %
BUN SERPL-SCNC: 16 MG/DL (ref 7–17)
CALCIUM SPEC-MCNC: 9.9 MG/DL (ref 8.4–10.2)
CHLORIDE SERPL-SCNC: 106 MMOL/L (ref 98–107)
CK SERPL-CCNC: 29 U/L (ref 30–135)
CK SERPL-CCNC: 41 U/L (ref 30–135)
CK SERPL-CCNC: 51 U/L (ref 30–135)
CO2 SERPL-SCNC: 25 MMOL/L (ref 22–30)
D DIMER PPP FEU-MCNC: 0.56 MG/L FEU (ref ?–0.6)
EOSINOPHIL # BLD AUTO: 0.2 K/UL (ref 0–0.7)
EOSINOPHIL NFR BLD AUTO: 3 %
ERYTHROCYTE [DISTWIDTH] IN BLOOD BY AUTOMATED COUNT: 4.85 M/UL (ref 3.8–5.4)
ERYTHROCYTE [DISTWIDTH] IN BLOOD: 16.1 % (ref 11.5–15.5)
GLUCOSE SERPL-MCNC: 103 MG/DL (ref 74–99)
HCT VFR BLD AUTO: 43 % (ref 34–46)
HGB BLD-MCNC: 13.6 GM/DL (ref 11.4–16)
INR PPP: 1 (ref ?–1.2)
LIPASE SERPL-CCNC: 231 U/L (ref 23–300)
LYMPHOCYTES # SPEC AUTO: 1.3 K/UL (ref 1–4.8)
LYMPHOCYTES NFR SPEC AUTO: 21 %
MAGNESIUM SPEC-SCNC: 2 MG/DL (ref 1.6–2.3)
MCH RBC QN AUTO: 28.1 PG (ref 25–35)
MCHC RBC AUTO-ENTMCNC: 31.6 G/DL (ref 31–37)
MCV RBC AUTO: 88.7 FL (ref 80–100)
MONOCYTES # BLD AUTO: 0.5 K/UL (ref 0–1)
MONOCYTES NFR BLD AUTO: 8 %
NEUTROPHILS # BLD AUTO: 4 K/UL (ref 1.3–7.7)
NEUTROPHILS NFR BLD AUTO: 65 %
PLATELET # BLD AUTO: 208 K/UL (ref 150–450)
POTASSIUM SERPL-SCNC: 4 MMOL/L (ref 3.5–5.1)
PROT SERPL-MCNC: 6.6 G/DL (ref 6.3–8.2)
PT BLD: 9.5 SEC (ref 9–12)
SODIUM SERPL-SCNC: 146 MMOL/L (ref 137–145)
TROPONIN I SERPL-MCNC: 0.05 NG/ML (ref 0–0.03)
TROPONIN I SERPL-MCNC: 0.37 NG/ML (ref 0–0.03)
TROPONIN I SERPL-MCNC: 0.48 NG/ML (ref 0–0.03)
WBC # BLD AUTO: 6.2 K/UL (ref 3.8–10.6)

## 2018-06-01 PROCEDURE — B2111ZZ FLUOROSCOPY OF MULTIPLE CORONARY ARTERIES USING LOW OSMOLAR CONTRAST: ICD-10-PCS

## 2018-06-01 PROCEDURE — B2151ZZ FLUOROSCOPY OF LEFT HEART USING LOW OSMOLAR CONTRAST: ICD-10-PCS

## 2018-06-01 PROCEDURE — 027034Z DILATION OF CORONARY ARTERY, ONE ARTERY WITH DRUG-ELUTING INTRALUMINAL DEVICE, PERCUTANEOUS APPROACH: ICD-10-PCS

## 2018-06-01 PROCEDURE — 4A023N7 MEASUREMENT OF CARDIAC SAMPLING AND PRESSURE, LEFT HEART, PERCUTANEOUS APPROACH: ICD-10-PCS

## 2018-06-01 RX ADMIN — NITROGLYCERIN SCH INCH: 20 OINTMENT TOPICAL at 06:42

## 2018-06-01 RX ADMIN — NITROGLYCERIN SCH INCH: 20 OINTMENT TOPICAL at 12:01

## 2018-06-01 RX ADMIN — DORZOLAMIDE HYDROCHLORIDE AND TIMOLOL MALEATE SCH DROPS: 20; 5 SOLUTION/ DROPS OPHTHALMIC at 09:12

## 2018-06-01 RX ADMIN — Medication SCH EACH: at 09:12

## 2018-06-01 RX ADMIN — FLUTICASONE PROPIONATE SCH SPRAY: 50 SPRAY, METERED NASAL at 21:00

## 2018-06-01 RX ADMIN — BRIMONIDINE TARTRATE SCH DROPS: 2 SOLUTION/ DROPS OPHTHALMIC at 09:12

## 2018-06-01 RX ADMIN — Medication SCH EACH: at 20:59

## 2018-06-01 RX ADMIN — Medication SCH: at 21:08

## 2018-06-01 RX ADMIN — DORZOLAMIDE HYDROCHLORIDE AND TIMOLOL MALEATE SCH DROPS: 20; 5 SOLUTION/ DROPS OPHTHALMIC at 15:26

## 2018-06-01 RX ADMIN — BRIMONIDINE TARTRATE SCH DROPS: 2 SOLUTION/ DROPS OPHTHALMIC at 15:25

## 2018-06-01 RX ADMIN — DORZOLAMIDE HYDROCHLORIDE AND TIMOLOL MALEATE SCH DROPS: 20; 5 SOLUTION/ DROPS OPHTHALMIC at 21:00

## 2018-06-01 RX ADMIN — Medication SCH: at 09:22

## 2018-06-01 RX ADMIN — BRIMONIDINE TARTRATE SCH DROPS: 2 SOLUTION/ DROPS OPHTHALMIC at 21:01

## 2018-06-01 RX ADMIN — FLUTICASONE PROPIONATE SCH: 50 SPRAY, METERED NASAL at 09:22

## 2018-06-01 RX ADMIN — FLUTICASONE PROPIONATE SCH SPRAY: 50 SPRAY, METERED NASAL at 09:13

## 2018-06-01 RX ADMIN — METOPROLOL TARTRATE SCH MG: 25 TABLET, FILM COATED ORAL at 21:00

## 2018-06-01 RX ADMIN — FLUTICASONE PROPIONATE SCH: 50 SPRAY, METERED NASAL at 21:08

## 2018-06-01 NOTE — P.HPIM
History of Present Illness


H&P Date: 18


Chief Complaint: accelerated hypertension/elevated troponin.





This is a 78-year-old  female one of Dr. Reynoso with a previous 

medical history significant for hypertension and hypertensive cardio vascular 

disease with left ventricular hypertrophy, hyperlipidemia, history of uterine 

cancer back in , legal blindness due to glaucoma, mitral valve prolapse, 

also history of DVT, patient called her primary care physician yesterday 

because of elevated hypertension she was directed to go to the ER her blood 

pressure was around 200 she was complaining of some chest pain associated with 

that and her troponin came back slightly elevated, patient initially went for 

CT angiography to rule out any dissection came back negative and negative for 

pulmonary embolism as well, she was placed on heparin drip and she was admitted 

to the hospital, cardiology consultation was obtained, and the patient was 

scheduled to go for left heart catheterization today.





Review of Systems


Constitutional: Denies fatigue, Denies lethargy, Denies weakness


Eyes: right loss of vision


Ears: deny: decreased hearing


Ears, nose, mouth and throat: Denies dysphagia, Denies neck lump, Denies sore 

throat


Cardiovascular: Reports chest pain, Reports decreased exercise tolerance, 

Reports high blood pressure, Reports shortness of breath, Denies rapid heart 

beat, Denies syncope


Respiratory: Denies congestion, Denies cough with sputum, Denies home oxygen, 

Denies sleep apnea, Denies snoring, Denies wheezing


Gastrointestinal: Denies abdominal pain, Denies bloating, Denies heartburn, 

Denies melena, Denies nausea, Denies vomiting


Genitourinary: Denies dysuria, Denies hematuria


Musculoskeletal: Denies myalgias


Musculoskeletal: absent: ankle pain, ankle stiffness, ankle swelling, elbow pain

, elbow stiffness, elbow swelling, foot pain, foot stiffness, foot swelling, 

hand pain, hand stiffness, hand swelling, hip pain, hip stiffness, hip swelling

, knee pain, knee stiffness, knee swelling, shoulder pain, shoulder stiffness, 

shoulder swelling, wrist pain, wrist stiffness, wrist swelling


Integumentary: Denies pruritus, Denies rash


Neurological: Denies numbness, Denies weakness


Psychiatric: Denies anxiety, Denies depression


Endocrine: Denies fatigue, Denies weight change





Past Medical History


Past Medical History: Cancer, Deep Vein Thrombosis (DVT), Hypertension, 

Osteoarthritis (OA)


Additional Past Medical History / Comment(s): mitral valve prolapse.  blind


History of Any Multi-Drug Resistant Organisms: None Reported


Past Surgical History: Hysterectomy


Additional Past Surgical History / Comment(s): eye surgery, endometrial and 

cervicle CA with radiation


Past Anesthesia/Blood Transfusion Reactions: No Reported Reaction


Past Psychological History: No Psychological Hx Reported


Smoking Status: Never smoker


Past Alcohol Use History: None Reported


Past Drug Use History: None Reported





- Past Family History


  ** Mother


Family Medical History: Cancer, Renal Disease


Additional Family Medical History / Comment(s): uterine CA





  ** Brother(s)


Family Medical History: Cancer


Additional Family Medical History / Comment(s): colon CA





  ** Sister(s)


Family Medical History: Cancer


Additional Family Medical History / Comment(s): colon and breast CA





  ** Father


Family Medical History: Myocardial Infarction (MI)


Additional Family Medical History / Comment(s):  at 54 from MI





Medications and Allergies


 Home Medications











 Medication  Instructions  Recorded  Confirmed  Type


 


Gabapentin [Neurontin] 300 mg PO HS 18 History


 


Brimonidine Tartrate [Alphagan P 1 drops BOTH EYES TID 18 History





0.2% Ophth Soln]    


 


Calcium Carbonate/Vitamin D3 1 tab PO BID 18 History





[Calcium 600-Vit D3 400 Caplet]    


 


Dorzolamide/Timolol/Pf [Cosopt Pf 1 applicator BOTH EYES TID 18 

History





2%/5% Ophth Droperette]    


 


Furosemide [Lasix] 20 mg PO DAILY PRN 18 History


 


Potassium 99 mg PO DAILY PRN 18 History


 


Alendronate Sodium [Alendronate 70 mg PO BROWN 18  Rx





Sodium]    


 


Aspirin EC [Ecotrin Low Dose] 81 mg PO DAILY #30 tablet.dr 18  Rx


 


Atorvastatin [Lipitor] 40 mg PO DAILY #30 tab 18  Rx


 


Clopidogrel [Plavix] 75 mg PO DAILY #30 tab 18  Rx


 


Fluticasone Nasal Spray [Flonase 1 spray EA NOSTRIL BID  spr 18  Rx





Nasal Spray]    


 


Metoprolol Tartrate [Lopressor] 25 mg PO BID #60 tab 18  Rx


 


Nitroglycerin Sl Tabs [Nitrostat] 0.4 mg SUBLINGUAL Q5M PRN #25 tab 18  Rx


 


Psyllium Husk 100% [Metamucil 6 gm PO DAILY PRN  packet 18  Rx





Packet]    











 Allergies











Allergy/AdvReac Type Severity Reaction Status Date / Time


 


ibuprofen [From Advil] AdvReac  Nausea & Verified 18 08:35





   Vomiting  














Physical Exam


Vitals: 


 Vital Signs











  Temp Pulse Pulse Resp BP BP Pulse Ox


 


 18 09:33  96.9 F L   71  20   138/64  100


 


 18 08:00  96.9 F L   71  20   138/64  100


 


 18 04:03  97.5 F L  71   16  164/72   100


 


 18 04:00  97.8 F   71  18   170/77  100


 


 18 02:52   70   16  185/74  


 


 18 02:49      173/75  


 


 18 02:33   68   18  213/94   97


 


 18 02:30  97.8 F   71  18   170/77  100


 


 18 01:50   70   15  214/90   99


 


 18 00:04  98.1 F  65   16  177/77   99


 


 18 23:38    71  16   


 


 18 23:11  98 F      








 Intake and Output











 18





 22:59 06:59 14:59


 


Intake Total  30 


 


Balance  30 


 


Intake:   


 


  Intake, IV Titration  30 





  Amount   


 


    Heparin Sodium,Porcine/  30 





    D5w Pmx 25,000 unit In   





    Dextrose/Water 1 500ml.   





    bag @ 12 UNITS/KG/HR 14.   





    15 mls/hr IV .Q24H [facility] Rx   





    #:[id number]   


 


Other:   


 


  Voiding Method  Toilet 


 


  # Voids  1 


 


  Weight  57.5 kg 














- Constitutional


General appearance: average body habitus, no acute distress





- EENT


Eyes: anicteric sclerae, EOMI, PERRLA, no ptosis, no scleral icterus, normal 

appearance


ENT: hearing grossly normal, NA/AT, normal oropharynx, no thrush


Ears: bilateral: normal





- Neck


Neck: no lymphadenopathy, normal ROM, no rigidity, no stridor, no thyromegaly


Carotids: bilateral: upstroke normal





- Respiratory


Respiratory: bilateral: diminished, negative: dullness, rales, rhonchi, wheezing

, prolonged expiration





- Cardiovascular


Rhythm: regular


Heart sounds: normal: S1, S2


Abnormal Heart Sounds: systolic murmur, no S3 Gallop





- Gastrointestinal


General gastrointestinal: normal bowel sounds, soft, no splenomegaly, no 

tenderness, no umbilical hernia, no ventral hernia





- Integumentary


Integumentary: normal, normal turgor





- Neurologic


Neurologic: CNII-XII intact





- Musculoskeletal


Musculoskeletal: strength equal bilaterally





- Psychiatric


Psychiatric: A&O x's 3, appropriate affect, intact judgment & insight





Results


CBC & Chem 7: 


 18 23:40





 18 06:12


Labs: 


 Abnormal Lab Results - Last 24 Hours (Table)











  18 Range/Units





  23:40 23:40 23:40 


 


RDW    16.1 H  (11.5-15.5)  %


 


APTT     (22.0-30.0)  sec


 


Sodium  146 H    (137-145)  mmol/L


 


Glucose  103 H    (74-99)  mg/dL


 


Total Creatine Kinase   29 L   ()  U/L


 


Troponin I   0.047 H*   (0.000-0.034)  ng/mL














  18 Range/Units





  23:40 06:18 


 


RDW    (11.5-15.5)  %


 


APTT  20.8 L   (22.0-30.0)  sec


 


Sodium    (137-145)  mmol/L


 


Glucose    (74-99)  mg/dL


 


Total Creatine Kinase    ()  U/L


 


Troponin I   0.478 H*  (0.000-0.034)  ng/mL














Thrombosis Risk Factor Assmnt





- DVT/VTE Prophylaxis


DVT/VTE Prophylaxis: Pharmacologic Prophylaxis ordered





- Choose All That Apply


Each Risk Factor Represents 3 Points: Age 75 years or older, History of DVT/PE


Thrombosis Risk Factor Assessment Total Risk Factor Score: 6


Thrombosis Risk Factor Assessment Level: High Risk





Assessment and Plan


Assessment: 





Assessment and plan:





1.  Accelerated hypertension with her chest pain.  Continue aspirin 325 mg once 

every day, heparin drip for now, metoprolol 25 mg orally once every day, 

Lipitor 20 mg orally once every day, echocardiogram, cardiology consultation, 

patient is scheduled to go for left heart catheterization.





2.  Peripheral neuropathy.  Continue gabapentin 300 mg bedtime.





3.  Hypertension.  Continue patient on metoprolol 25 mg orally once every day.





4.  History of Endometrial cancer back in  with radiation colitis.  Stable 

at this time.





5.  Hyperlipidemia.  Continue Lipitor 20 mg orally once every day.





6.  DVT prophylaxis.  Currently on heparin drip.





7.  GI prophylaxis.  Continue PPI





8.  Admit to inpatient.  Estimated length of stay 2 midnights.





9.  Patient is full code.

## 2018-06-01 NOTE — CC
CARDIAC CATHETERIZATION REPORT



Mrs. Sullivan is a 78-year-old female with a history of hypertension and no prior

documented history of coronary artery disease, who presented with symptoms of chest

discomfort associated with mild troponin elevation. In view of that, recommendation was

made regarding cardiac catheterization.  The procedure as well as the risks and the

complications were discussed with the patient who is in full understanding and

agreement.



PROCEDURE:

Patient was brought to the cath lab in a fasting semi-sedated state after receiving

fentanyl and Benadryl and achieving moderate conscious sedated state.  Using Xylocaine

anesthesia in the Seldinger technique, a 6-Chilean sheath was introduced in the right

femoral artery.  Selective right and left angiography performed using 6-Chilean 4 bend

right and left Hudson catheter.  Multiple views of the coronary arteries including

hemiaxial views were obtained.  Following that, the 6-Chilean tight pigtail catheter

introduced in the left ventricle and a 30-degree CASTELLANOS view of the left ventricle was

obtained. Following that, the catheter was removed and images were reviewed.



FINDINGS:

FLUOROSCOPY: Significant calcification involving the left anterior descending artery

and the right coronary artery was noted.



LEFT MAIN: This is a large-sized vessel bifurcating in the left circumflex and left

anterior descending artery. Left main coronary artery without any significant

obstructive disease.



LEFT ANTERIOR DESCENDING ARTERY: This is a large-sized vessel reaching towards the apex

with a wraparound apex segment giving rise to a large diagonal branch.  The takeoff of

the diagonal branch has a tubular lesion of 70%.  There is mild plaque in the LAD after

the takeoff. Beyond that there is mild intimal disease without any evidence of high-

grade stenosis.



LEFT CIRCUMFLEX: This is a nondominant vessel giving rise to a large obtuse marginal

branch.  The left circumflex as well as branches have no evidence of obstructive

coronary artery disease.



RIGHT CORONARY ARTERY:  This is a dominant vessel, moderate in caliber bifurcating into

PDA and posterolateral segment and branches.  The right coronary artery in mid and

distal segment has diffuse intimal disease with area of stenosis up to 40%.  The vessel

is heavily calcified.



LEFT VENTRICULOGRAM: Left ventriculogram is performed in 30-degree CASTELLANOS view and

revealed a mid anterior wall hypokinesis to akinesis.  Ejection fraction is 45%.  There

was no significant mitral regurgitation.



HEMODYNAMICS:

There was no gradient across the aortic valve. The left ventricular end-diastolic

pressure was 14 to 16 mmHg.



CONCLUSION:

1. Critical stenosis involving the first diagonal branch.

2. Moderate disease in the right coronary artery and mild disease in the left

    circumflex.

3. Calcified coronary arteries.

4. Mildly impaired left ventricular systolic function.



RECOMMENDATION:

In view of finding anatomy, I have recommended proceeding with angioplasty and stenting

of the diagonal branch.  The procedure as well as the risks and the complications were

discussed with the patient who is in full understanding and agreement.





GONZALO / SELENE: [id number] / Job#: [id number]

## 2018-06-01 NOTE — LTR
DATE OF SERVICE:  06/01/2018





RE:  Kiki Sullivan





Dear Dr. Reynoso;



I had the pleasure to perform cardiac catheterization on Mrs. Sullivan at [facility] on June 1, 2018 and a full copy of the procedure note will be forwarded to

you.



In brief, she was found to have critical stenosis involving the first diagonal branch

with calcified coronary arteries.  She underwent successful stenting of that vessel

using a drug-eluting stent.  I am hopeful that this procedure will stabilize her status

and thank you again for allowing me to participate in this patient's care.



Please feel free to call for any questions.



Sincerely yours,





Miguel Tobin MD





MMUBALDOL / BIMALN: [id number] / Job#: [id number]

## 2018-06-01 NOTE — CONS
CONSULTATION



This is a 78-year-old lady with a known history hypertensive cardiovascular disease,

who came into the hospital with uncomfortable pressure-like feeling in the chest that

occurred and she also noted that her blood pressure was significantly elevated.  At the

time of my evaluation, she is comfortable, but still has some achy feeling in the

chest.  In the early hours after she came in, she had a CT angiography performed which

revealed no significant disease and there was no evidence of any pulmonary embolism or

any aortic pathology.  She came into the hospital with chest pain and had accelerated

hypertension which has since then improved.  She is resting comfortably, but has mild

achy feeling in the chest.



PAST MEDICAL HISTORY:

1. Hypertension.

2. Neuropathy of unclear etiology.

3. No evidence of any prior myocardial infarction or CVA.

4. Patient's hypertension is recent and she has been keeping a log of it for the past

    3 weeks, but is not on any specific medications.



MEDICATIONS:

Medications at home include gabapentin, Lasix 20 mg daily, aspirin 325 mg daily.  She

takes some vitamin supplements.



ALLERGIES:

SHE IS ALLERGIC TO ADVIL.



EXAMINATION:

Blood pressure was 130/60, pulse rate is about 70 per minute and regular.

HEENT: Unremarkable.  Fundus was not examined by me.

NECK:  Supple.  There is no JVD.  I do not hear a carotid bruit.  There is no

thyromegaly.

HEART exam reveals S1, S2 heard normally.  No rub, murmur or gallop.

LUNGS are clear.

ABDOMEN is soft, nontender.

EXTREMITIES: Lower extremities normal pulses.  No edema CENTRAL NERVOUS SYSTEM: Central

nervous system is normal.



LABORATORY DATA:

Reveals that the troponin is borderline elevated at 0.04.

Renal function is normal.  BNP is normal.  D-dimer is normal.



IMPRESSION:

1. Acute ischemic syndrome, probable unstable angina.

2. Accelerated hypertension.



RECOMMENDATION:

I am recommending that we continue heparinization and proceed with coronary angiography

and intervention based on findings.  I discussed my thoughts in detail with the

patient.  I explained to her the rationale, risks, benefits, options related to

coronary angiography and the risks involved.  She understands and wishes to proceed.

We will decrease the aspirin to 81 mg daily.  Start her on metoprolol 25 mg daily.

Proceed with coronary angiography and based on findings, make further recommendations.

Discussed my thoughts in detail with the patient.



Thank you very much for the consultation.





MMODL / IJN: [id number] / Job#: [id number]

## 2018-06-01 NOTE — ED
Chest Pain HPI





- General


Chief Complaint: Chest Pain


Stated Complaint: High BP


Time Seen by Provider: 18 00:17


Source: patient


Mode of arrival: ambulatory


Limitations: no limitations





- History of Present Illness


Initial Comments: 


78 years old female presents with high blood pressure, blood pressure at home 

was greater than 200 she called her doctor she was advised come to the ER after 

arriving in the ER she developed chest pain this is a mild chest pain worse 

with a deep breaths she denies any cough no sputum production no fever no 

chills abdominal pain no frequency urgency dysuria no symptoms of TIA or CVA








- Related Data


 Home Medications











 Medication  Instructions  Recorded  Confirmed


 


Alendronate Sodium 70 mg PO BROWN 14


 


Gabapentin [Neurontin] 300 mg PO HS 18


 


Aspirin [Adult Low Dose Aspirin EC] 81 mg PO HS 18


 


Brimonidine Tartrate [Alphagan P 1 drops BOTH EYES TID 18





0.2% Ophth Soln]   


 


Calcium Carbonate/Vitamin D3 1 tab PO BID 18





[Calcium 600-Vit D3 400 Caplet]   


 


Dorzolamide/Timolol/Pf [Cosopt Pf 1 applicator BOTH EYES TID 18





2%/5% Ophth Droperette]   


 


Furosemide [Lasix] 20 mg PO DAILY PRN 18


 


Potassium 99 mg PO DAILY PRN 18








 Previous Rx's











 Medication  Instructions  Recorded


 


Fluticasone Nasal Spray [Flonase 1 spray EA NOSTRIL BID #1 bottle 04/15/18





Nasal Spray]  


 


Psyllium Husk 100% [Metamucil 1 packet PO DAILY PRN #30 packet 04/15/18





Packet]  











 Allergies











Allergy/AdvReac Type Severity Reaction Status Date / Time


 


ibuprofen [From Advil] AdvReac  Nausea & Verified 18 23:16





   Vomiting  














Review of Systems


ROS Statement: 


Those systems with pertinent positive or pertinent negative responses have been 

documented in the HPI.





ROS Other: All systems not noted in ROS Statement are negative.





EKG Findings





- EKG Comments:


EKG Findings:: EKG is normal sinus rhythm ventricular rate is 70 ND interval is 

140 QRS duration is 88 QT//490 via of this EKG does not reveal any ST 

elevation or ST depression





Past Medical History


Past Medical History: Cancer, Deep Vein Thrombosis (DVT), Hypertension, 

Osteoarthritis (OA)


Additional Past Medical History / Comment(s): mitral valve prolapse.  blind


History of Any Multi-Drug Resistant Organisms: None Reported


Additional Past Surgical History / Comment(s): eye surgery, endometrial and 

cervicle CA with radiation


Past Anesthesia/Blood Transfusion Reactions: No Reported Reaction


Past Psychological History: No Psychological Hx Reported


Smoking Status: Never smoker


Past Alcohol Use History: None Reported


Past Drug Use History: None Reported





- Past Family History


  ** Mother


Family Medical History: Cancer (Colon cancer and breast cancer,), Renal Disease 

(3rd sister  with dialysis)


Additional Family Medical History / Comment(s): uterine CA





  ** Brother(s)


Family Medical History: Cancer (Colon cancer)


Additional Family Medical History / Comment(s): colon CA





  ** Sister(s)


Family Medical History: Cancer


Additional Family Medical History / Comment(s): colon and breast CA





  ** Father


Family Medical History: Myocardial Infarction (MI) (Age 54)


Additional Family Medical History / Comment(s):  at 54 from MI





General Exam





- General Exam Comments


Initial Comments: 


General:  The patient is awake and alert, in no distress, and does not appear 

acutely ill. 


Skin:  Skin is warm and dry and no rashes or lesions are noted. 


Eye:  Pupils are equal, round and reactive to light, extra-ocular movements are 

intact; there is normal conjunctiva bilaterally.  


Ears, nose, mouth and throat:  There are moist mucous membranes and no oral 

lesions. 


Neck:  The neck is supple, there is no tenderness  or JVD.  


Cardiovascular:  There is a regular rate and rhythm. No murmur, rub or gallop 

is appreciated.


Respiratory: To auscultation bilateral, no wheezing no rhonchi no distress  

respiratory wise noticed


Gastrointestinal:  Soft, non-distended, non-tender abdomen without masses or 

organomegaly noted. There is no rebound or guarding present. Bowel sounds are 

unremarkable. 


Back:  There is no tenderness to palpation in the midline. There is no obvious 

deformity.


Musculoskeletal:  Normal ROM, no tenderness, There is no pedal edema. There is 

no calf tenderness or swelling. No cords were appreciated.  


Neurological:  CN II-XII intact, Cranial nerves III through XII are intact. 

There are no obvious motor or sensory deficits. Coordination appears grossly 

intact. Speech is normal.


Psychiatric:  Cooperative, appropriate mood & affect, normal judgment.  








Limitations: no limitations





Course





 Vital Signs











  18





  23:11 23:38 00:04


 


Temperature 98 F  98.1 F


 


Pulse Rate   65


 


Pulse Rate [  71 





Right Pulse   





Oximetery]   


 


Respiratory  16 16





Rate   


 


Blood Pressure   177/77


 


O2 Sat by Pulse   99





Oximetry   














Critical Care Time


Total Critical Care Time: 45


Critical Care Time: 





Her troponin came back positive and blood pressure is high as 240 systolic she 

be given labetalol 20 mg IV will keep an eye on its if she needs repeat dosage 

considering her troponin elevation should be admitted to she be admitted to Dr. Hwang's group consult cardiology and denies if indicated





Disposition


Clinical Impression: 


 Hypertension, Elevated troponin





Disposition: ADMITTED AS IP TO THIS HOSP


Condition: Good


Referrals: 


Ashwin Reynoso DO [Primary Care Provider] - 1-2 days

## 2018-06-01 NOTE — ECHOF
Referral Reason:lv function



MEASUREMENTS

--------

HEIGHT: 162.6 cm

WEIGHT: 57.2 kg

BP: 164/72

RVIDd:   2.6 cm     (< 3.3)

IVSd:   0.9 cm     (0.6 - 1.1)

LVIDd:   4.5 cm     (3.9 - 5.3)

LVPWd:   0.8 cm     (0.6 - 1.1)

IVSs:   1.1 cm

LVIDs:   3.2 cm

LVPWs:   1.2 cm

LAESV Index (A-L):   28.67 ml/m

Ao Diam:   2.8 cm     (2.0 - 3.7)

AV Cusp:   1.8 cm     (1.5 - 2.6)

LA Diam:   2.7 cm     (2.7 - 3.8)

MV E Jeffry:   0.90 m/s

MV DecT:   220 ms

MV A Jeffry:   0.75 m/s

MV E/A Ratio:   1.20 

RAP:   5.00 mmHg

RVSP:   26.68 mmHg

MV EF SLOPE:   114.08 mm/s     (70 - 150)

MV EXCURSION:   1.66 cm     (> 18.000)







FINDINGS

--------

Sinus rhythm.

This was a technically adequate study.

The left ventricular size is normal.   Left ventricular wall thickness is normal.   Overall left vent
ricular systolic function is mild-moderately impaired with, an EF between 40 - 45 %.   Septal Hypokin
esis

The right ventricle is normal in size and function.

LA is midly dilated 29-33ml/m2.

RA appears enlarged.

Aortic valve is trileaflet and is mildly thickened.   There is mild aortic regurgitation.   There is 
no evidence of aortic stenosis.

The mitral valve leaflets are mildly thickened.   Mild mitral regurgitation is present.

Mild tricuspid regurgitation present.   Right ventricular systolic pressure is normal at < 35 mmHg.  
 There is no evidence of pulmonary hypertension.

Trace/mild (physiologic)  pulmonic regurgitation.

The aortic root size is normal.

Normal inferior vena cava with normal inspiratory collapse consistent with estimated right atrial pre
ssure of  5 mmHg.

There is a trivial pericardial effusion present.



CONCLUSIONS

--------

1. Sinus rhythm.

2. This was a technically adequate study.

3. The left ventricular size is normal.

4. Left ventricular wall thickness is normal.

5. Overall left ventricular systolic function is mild-moderately impaired with, an EF between 40 - 45
 %.

6. Septal Hypokinesis

7. LA is midly dilated 29-33ml/m2.

8. RA appears enlarged.

9. Aortic valve is trileaflet and is mildly thickened.

10. There is mild aortic regurgitation.

11. The mitral valve leaflets are mildly thickened.

12. Mild mitral regurgitation is present.

13. Mild tricuspid regurgitation present.

14. Right ventricular systolic pressure is normal at < 35 mmHg.

15. There is no evidence of pulmonary hypertension.

16. Trace/mild (physiologic)  pulmonic regurgitation.

17. The aortic root size is normal.

18. There is a trivial pericardial effusion present.





SONOGRAPHER: Perry Higginbotham RDCS

## 2018-06-01 NOTE — PTCA
PERCUTANEOUSTRANS CORORONARY ANGIOGRAPHY



ANGIOPLASTY AND STENTING PROCEDURE NOTE



Mrs. Sullivan is a 78-year-old female with a known history of hypertension who presented

with non ST-segment elevation myocardial infarction, underwent cardiac catheterization,

was found to have critical stenosis involving the first diagonal branch.

Recommendation was made regarding angioplasty and stenting.  The procedures,. risks and

complication were discussed with the patient who is in full understanding and

agreement.



PROCEDURE:

A 6-Tristanian FR4 guiding catheter introduced into the system. After cannulating the left

main, a 0.014 advanced medium weight J-wire was advanced and positioned in the diagonal

branch.  Another 0.014 advanced medium weight J-wire was positioned in the distal LAD.

Following that, a 2.5 x 50 mm Xience Alpine stent was advanced, deployed and post-

dilated at 14 atmospheres.  After the last inflation, after appropriate wait, the

balloon and the guidewire were withdrawn back in the guiding catheter.  Images were

obtained and repeated. Those images reveal stable successful stenting.  At that point,

the guiding catheter, the balloon and the guidewire were removed.  The sheath was

sutured in place.  The patient was returned to her room in stable condition.  Of note,

patient had no significant chest pain, but she had EKG changes that resolved.  She

received Angiomax per protocol as well as oral loading dose of clopidogrel.



RESULTS:

Successful stenting of the ostium of the first diagonal branch with reduction of

stenosis from 70% to 0%.



RECOMMENDATION:

Patient be continued on aspirin, Plavix, beta blocker, and statin.  The importance of

dual antiplatelet treatment were discussed with the patient who is in full

understanding and agreement.



Duration procedure is 31 minutes.





MMODL / IJN: [id number] / Job#: [id number]

## 2018-06-01 NOTE — XR
EXAMINATION TYPE: XR chest 2V

 

DATE OF EXAM: 6/1/2018

 

COMPARISON: NONE

 

HISTORY: Chest pain

 

TECHNIQUE:  Frontal and lateral views of the chest are obtained.

 

FINDINGS:  There is no heart failure nor confluent pneumonic infiltrate. Costophrenic angles are richard
r. There are chest leads. Bony thorax is intact.

 

IMPRESSION:  Normal chest. No change.

## 2018-06-01 NOTE — CT
EXAM:

  CT Angiography Chest With Intravenous Contrast

 

CLINICAL HISTORY:

Pain

 

TECHNIQUE:

  Axial computed tomographic angiography images of the chest with 

intravenous contrast using pulmonary embolism protocol.  CTDI is  38.2 

mGy and DLP is 160 mGy-cm.  This CT exam was performed using one or more 

of the following dose reduction techniques: automated exposure control, 

adjustment of the mA and/or kV according to patient size, and/or use of 

iterative reconstruction technique.

  MIP reconstructed images were created and reviewed.

  Coronal and sagittal reformatted images were created and reviewed.

 

COMPARISON:

  No relevant prior studies available.

 

FINDINGS:

  Pulmonary arteries:  Unremarkable.  No pulmonary embolism.

  Aorta:  No acute findings.  No thoracic aortic aneurysm.

  Lungs:  Unremarkable.  No mass.  No consolidation.  Apical scarring is 

evident

  Pleural space:  Unremarkable.  No significant effusion.  No 

pneumothorax.

  Heart:  Unremarkable.  No cardiomegaly.  No significant pericardial 

effusion.  No evidence of RV dysfunction.

  Bones/joints:  No acute fracture.  No dislocation.

  Soft tissues:  Unremarkable.

  Lymph nodes:  Unremarkable.  No enlarged lymph nodes.

 

IMPRESSION:     

  Normal chest CTA.  No pulmonary embolism.

160

## 2018-06-02 VITALS — DIASTOLIC BLOOD PRESSURE: 68 MMHG | SYSTOLIC BLOOD PRESSURE: 154 MMHG | HEART RATE: 81 BPM | TEMPERATURE: 98.1 F

## 2018-06-02 VITALS — RESPIRATION RATE: 16 BRPM

## 2018-06-02 LAB
ANION GAP SERPL CALC-SCNC: 10 MMOL/L
BUN SERPL-SCNC: 9 MG/DL (ref 7–17)
CALCIUM SPEC-MCNC: 8.8 MG/DL (ref 8.4–10.2)
CHLORIDE SERPL-SCNC: 109 MMOL/L (ref 98–107)
CHOLEST SERPL-MCNC: 148 MG/DL (ref ?–200)
CO2 SERPL-SCNC: 24 MMOL/L (ref 22–30)
GLUCOSE BLD-MCNC: 87 MG/DL (ref 75–99)
GLUCOSE SERPL-MCNC: 77 MG/DL (ref 74–99)
HDLC SERPL-MCNC: 53 MG/DL (ref 40–60)
LDLC SERPL CALC-MCNC: 73 MG/DL (ref 0–99)
POTASSIUM SERPL-SCNC: 3.9 MMOL/L (ref 3.5–5.1)
SODIUM SERPL-SCNC: 143 MMOL/L (ref 137–145)
TRIGL SERPL-MCNC: 111 MG/DL (ref ?–150)

## 2018-06-02 RX ADMIN — DORZOLAMIDE HYDROCHLORIDE AND TIMOLOL MALEATE SCH DROPS: 20; 5 SOLUTION/ DROPS OPHTHALMIC at 09:24

## 2018-06-02 RX ADMIN — FLUTICASONE PROPIONATE SCH: 50 SPRAY, METERED NASAL at 09:26

## 2018-06-02 RX ADMIN — BRIMONIDINE TARTRATE SCH DROPS: 2 SOLUTION/ DROPS OPHTHALMIC at 16:05

## 2018-06-02 RX ADMIN — Medication SCH: at 09:26

## 2018-06-02 RX ADMIN — METOPROLOL TARTRATE SCH MG: 25 TABLET, FILM COATED ORAL at 09:24

## 2018-06-02 RX ADMIN — BRIMONIDINE TARTRATE SCH DROPS: 2 SOLUTION/ DROPS OPHTHALMIC at 09:25

## 2018-06-02 RX ADMIN — DORZOLAMIDE HYDROCHLORIDE AND TIMOLOL MALEATE SCH DROPS: 20; 5 SOLUTION/ DROPS OPHTHALMIC at 16:05

## 2018-06-02 NOTE — PN
PROGRESS NOTE



Mrs. Sullivan underwent stenting of a diagonal branch performed by Dr. Tobin with a

good result.  She had a non-ST elevation MI with hypokinesia in the diagonal

distribution.  EKG suggests the same today.  Her labs are good.



VITAL SIGNS:  Stable.  Right groin is clean and dry.  S1, S2 heard normally.  Lungs are

clear.  Abdomen, lower extremities unchanged.



Plan is to continue current medications, increase activity, discharge her today and she

will see Dr. Tobin in 1 week.





MMODL / IJN: [id number] / Job#: [id number]

## 2018-06-02 NOTE — P.DS
Providers


Date of admission: 


06/01/18 01:58





Attending physician: 


Isauro Hwang





Consults: 





 





06/01/18 01:58


Consult Physician Urgent 


   Consulting Provider: Kali Admas


   Consult Reason/Comments: elevated troponin


   Do you want consulting provider notified?: Yes





06/01/18 13:29


Consult Physician Routine 


   Consulting Provider: [facility]


   Consult Reason/Comments: Post Interventional patient


   Do you want consulting provider notified?: Already Contacted











Primary care physician: 


Ashwin Reynoso





[facility] Course: 








This is a 78-year-old  female one of Dr. Reynoso with a previous 

medical history significant for hypertension and hypertensive cardio vascular 

disease with left ventricular hypertrophy, hyperlipidemia, history of uterine 

cancer back in 2010, legal blindness due to glaucoma, mitral valve prolapse, 

also history of DVT, patient called her primary care physician yesterday 

because of elevated hypertension she was directed to go to the ER her blood 

pressure was around 200 she was complaining of some chest pain associated with 

that and her troponin came back slightly elevated, patient initially went for 

CT angiography to rule out any dissection came back negative and negative for 

pulmonary embolism as well, she was placed on heparin drip and she was admitted 

to the hospital, cardiology consultation was obtained, and the patient was 

scheduled to go for left heart catheterization today.





6-2018 she underwent cardiac cath performed by Dr. Tobin  requiring stenting 

of the first diagonal branch with 70% stenosis down to 0.  Patient is chest pain

-free on discharge, patient cleared by cardiology on discharge were 

antiplatelet regimen





 Final diagnoses 


1.  Accelerated hypertension with her chest pain acute coronary syndrome 

borderline elevated troponin peak troponin of 0.478.  Continue aspirin 325 mg 

once every day, heparin drip for now, metoprolol 25 mg orally once every day, 

Lipitor 20 mg orally once every day, echocardiogram, cardiology consultation, 

patient is scheduled to go for left heart catheterization.  Performed 06/02/ 2018 by Dr. Tobinwith successful stenting of the first diagonal branch with 

reduction of stenosis from 70% to 0%





2.  Peripheral neuropathy.  Continue gabapentin 300 mg bedtime.





3.  Hypertension.  Continue patient on metoprolol 25 mg orally once every day.





4.  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  Stable at 

this time.





5.  Hyperlipidemia.  Dose titrated up to [id number] orally once every day.





6.  DVT prophylaxis.   





7.  GI prophylaxis. 





9.  Patient is full code.








Discharge Medication List





Gabapentin [Neurontin] 300 mg PO HS 01/16/18 [History]


Brimonidine Tartrate [Alphagan P 0.2% Ophth Soln] 1 drops BOTH EYES TID 04/13/ 18 [History]


Calcium Carbonate/Vitamin D3 [Calcium 600-Vit D3 400 Caplet] 1 tab PO BID 04/13/ 18 [History]


Dorzolamide/Timolol/Pf [Cosopt Pf 2%/5% Ophth Droperette] 1 applicator BOTH 

EYES TID 04/13/18 [History]


Furosemide [Lasix] 20 mg PO DAILY PRN 04/13/18 [History]


Potassium 99 mg PO DAILY PRN 04/13/18 [History]


Alendronate Sodium [Alendronate Sodium] 70 mg PO BROWN 06/02/18 [Rx]


Aspirin EC [Ecotrin Low Dose] 81 mg PO DAILY #30 tablet.dr 06/02/18 [Rx]


Atorvastatin [Lipitor] 40 mg PO DAILY #30 tab 06/02/18 [Rx]


Clopidogrel [Plavix] 75 mg PO DAILY #30 tab 06/02/18 [Rx]


Fluticasone Nasal Spray [Flonase Nasal Spray] 1 spray EA NOSTRIL BID  spr 06/02/ 18 [Rx]


Metoprolol Tartrate [Lopressor] 25 mg PO BID #60 tab 06/02/18 [Rx]


Nitroglycerin Sl Tabs [Nitrostat] 0.4 mg SUBLINGUAL Q5M PRN #25 tab 06/02/18 [Rx

]


Psyllium Husk 100% [Metamucil Packet] 6 gm PO DAILY PRN  packet 06/02/18 [Rx]








Patient Condition at Discharge: Good





Plan - Discharge Summary


New Discharge Prescriptions: 


New


   Alendronate Sodium [Alendronate Sodium] 70 mg PO BROWN


   Atorvastatin [Lipitor] 40 mg PO DAILY #30 tab


   Clopidogrel [Plavix] 75 mg PO DAILY #30 tab


   Fluticasone Nasal Spray [Flonase Nasal Spray] 1 spray EA NOSTRIL BID  spr


   Metoprolol Tartrate [Lopressor] 25 mg PO BID #60 tab


   Nitroglycerin Sl Tabs [Nitrostat] 0.4 mg SUBLINGUAL Q5M PRN #25 tab


     PRN Reason: Chest Pain


   Psyllium Husk 100% [Metamucil Packet] 6 gm PO DAILY PRN  packet


     PRN Reason: Constipation


   Aspirin EC [Ecotrin Low Dose] 81 mg PO DAILY #30 tablet.





Continue


   Gabapentin [Neurontin] 300 mg PO HS


   Brimonidine Tartrate [Alphagan P 0.2% Ophth Soln] 1 drops BOTH EYES TID


   Furosemide [Lasix] 20 mg PO DAILY PRN


     PRN Reason: Edema


   Potassium 99 mg PO DAILY PRN


     PRN Reason: Edema


   Dorzolamide/Timolol/Pf [Cosopt Pf 2%/5% Ophth Droperette] 1 applicator BOTH 

EYES TID


   Calcium Carbonate/Vitamin D3 [Calcium 600-Vit D3 400 Caplet] 1 tab PO BID





Discontinued


   Aspirin EC [Ecotrin] 325 mg PO HS


Discharge Medication List





Gabapentin [Neurontin] 300 mg PO HS 01/16/18 [History]


Brimonidine Tartrate [Alphagan P 0.2% Ophth Soln] 1 drops BOTH EYES TID 04/13/ 18 [History]


Calcium Carbonate/Vitamin D3 [Calcium 600-Vit D3 400 Caplet] 1 tab PO BID 04/13/ 18 [History]


Dorzolamide/Timolol/Pf [Cosopt Pf 2%/5% Ophth Droperette] 1 applicator BOTH 

EYES TID 04/13/18 [History]


Furosemide [Lasix] 20 mg PO DAILY PRN 04/13/18 [History]


Potassium 99 mg PO DAILY PRN 04/13/18 [History]


Alendronate Sodium [Alendronate Sodium] 70 mg PO BROWN 06/02/18 [Rx]


Aspirin EC [Ecotrin Low Dose] 81 mg PO DAILY #30 tablet. 06/02/18 [Rx]


Atorvastatin [Lipitor] 40 mg PO DAILY #30 tab 06/02/18 [Rx]


Clopidogrel [Plavix] 75 mg PO DAILY #30 tab 06/02/18 [Rx]


Fluticasone Nasal Spray [Flonase Nasal Spray] 1 spray EA NOSTRIL BID  spr 06/02/ 18 [Rx]


Metoprolol Tartrate [Lopressor] 25 mg PO BID #60 tab 06/02/18 [Rx]


Nitroglycerin Sl Tabs [Nitrostat] 0.4 mg SUBLINGUAL Q5M PRN #25 tab 06/02/18 [Rx

]


Psyllium Husk 100% [Metamucil Packet] 6 gm PO DAILY PRN  packet 06/02/18 [Rx]








Follow up Appointment(s)/Referral(s): 


Miguel Tobin MD [STAFF PHYSICIAN] - 1 Week


(Please call to make a follow up appointment.


I am unable to make one at this time.)


Ashwin Reynoso DO [Primary Care Provider] - 1-2 days


(Please call to make a follow up appointment.


I am unable to make one at this time.)


Patient Instructions/Handouts:  Myocardial Infarction (DC), Hypertension (DC), 

Heart Catheterization (DC)


Discharge Disposition: HOME SELF-CARE

## 2018-07-05 ENCOUNTER — HOSPITAL ENCOUNTER (OUTPATIENT)
Dept: HOSPITAL 47 - EC | Age: 78
Setting detail: OBSERVATION
LOS: 2 days | Discharge: HOME | End: 2018-07-07
Attending: FAMILY MEDICINE | Admitting: FAMILY MEDICINE
Payer: MEDICARE

## 2018-07-05 VITALS — BODY MASS INDEX: 20.8 KG/M2

## 2018-07-05 DIAGNOSIS — Z79.02: ICD-10-CM

## 2018-07-05 DIAGNOSIS — Z80.3: ICD-10-CM

## 2018-07-05 DIAGNOSIS — Z84.1: ICD-10-CM

## 2018-07-05 DIAGNOSIS — Z88.8: ICD-10-CM

## 2018-07-05 DIAGNOSIS — Z79.899: ICD-10-CM

## 2018-07-05 DIAGNOSIS — E78.5: ICD-10-CM

## 2018-07-05 DIAGNOSIS — H40.9: ICD-10-CM

## 2018-07-05 DIAGNOSIS — Z86.718: ICD-10-CM

## 2018-07-05 DIAGNOSIS — G62.9: ICD-10-CM

## 2018-07-05 DIAGNOSIS — Z85.41: ICD-10-CM

## 2018-07-05 DIAGNOSIS — M19.90: ICD-10-CM

## 2018-07-05 DIAGNOSIS — Z92.3: ICD-10-CM

## 2018-07-05 DIAGNOSIS — Z85.42: ICD-10-CM

## 2018-07-05 DIAGNOSIS — I34.1: ICD-10-CM

## 2018-07-05 DIAGNOSIS — R05: ICD-10-CM

## 2018-07-05 DIAGNOSIS — I16.1: ICD-10-CM

## 2018-07-05 DIAGNOSIS — I25.10: ICD-10-CM

## 2018-07-05 DIAGNOSIS — Z79.82: ICD-10-CM

## 2018-07-05 DIAGNOSIS — H54.8: ICD-10-CM

## 2018-07-05 DIAGNOSIS — Z80.0: ICD-10-CM

## 2018-07-05 DIAGNOSIS — I11.9: Primary | ICD-10-CM

## 2018-07-05 DIAGNOSIS — Z95.5: ICD-10-CM

## 2018-07-05 LAB
ALBUMIN SERPL-MCNC: 3.8 G/DL (ref 3.5–5)
ALP SERPL-CCNC: 68 U/L (ref 38–126)
ALT SERPL-CCNC: 27 U/L (ref 9–52)
ANION GAP SERPL CALC-SCNC: 11 MMOL/L
AST SERPL-CCNC: 41 U/L (ref 14–36)
BASOPHILS # BLD AUTO: 0 K/UL (ref 0–0.2)
BASOPHILS NFR BLD AUTO: 0 %
BUN SERPL-SCNC: 18 MG/DL (ref 7–17)
CALCIUM SPEC-MCNC: 9.4 MG/DL (ref 8.4–10.2)
CHLORIDE SERPL-SCNC: 104 MMOL/L (ref 98–107)
CK SERPL-CCNC: 38 U/L (ref 30–135)
CO2 SERPL-SCNC: 28 MMOL/L (ref 22–30)
EOSINOPHIL # BLD AUTO: 0.2 K/UL (ref 0–0.7)
EOSINOPHIL NFR BLD AUTO: 4 %
ERYTHROCYTE [DISTWIDTH] IN BLOOD BY AUTOMATED COUNT: 4.59 M/UL (ref 3.8–5.4)
ERYTHROCYTE [DISTWIDTH] IN BLOOD: 16.1 % (ref 11.5–15.5)
GLUCOSE SERPL-MCNC: 106 MG/DL (ref 74–99)
HCT VFR BLD AUTO: 42.1 % (ref 34–46)
HGB BLD-MCNC: 13.4 GM/DL (ref 11.4–16)
LYMPHOCYTES # SPEC AUTO: 0.9 K/UL (ref 1–4.8)
LYMPHOCYTES NFR SPEC AUTO: 18 %
MAGNESIUM SPEC-SCNC: 1.9 MG/DL (ref 1.6–2.3)
MCH RBC QN AUTO: 29.1 PG (ref 25–35)
MCHC RBC AUTO-ENTMCNC: 31.8 G/DL (ref 31–37)
MCV RBC AUTO: 91.7 FL (ref 80–100)
MONOCYTES # BLD AUTO: 0.3 K/UL (ref 0–1)
MONOCYTES NFR BLD AUTO: 7 %
NEUTROPHILS # BLD AUTO: 3.6 K/UL (ref 1.3–7.7)
NEUTROPHILS NFR BLD AUTO: 69 %
PLATELET # BLD AUTO: 203 K/UL (ref 150–450)
POTASSIUM SERPL-SCNC: 4.8 MMOL/L (ref 3.5–5.1)
PROT SERPL-MCNC: 6.8 G/DL (ref 6.3–8.2)
SODIUM SERPL-SCNC: 143 MMOL/L (ref 137–145)
TROPONIN I SERPL-MCNC: <0.012 NG/ML (ref 0–0.03)
WBC # BLD AUTO: 5.2 K/UL (ref 3.8–10.6)

## 2018-07-05 PROCEDURE — 82553 CREATINE MB FRACTION: CPT

## 2018-07-05 PROCEDURE — 93005 ELECTROCARDIOGRAM TRACING: CPT

## 2018-07-05 PROCEDURE — 80053 COMPREHEN METABOLIC PANEL: CPT

## 2018-07-05 PROCEDURE — 96372 THER/PROPH/DIAG INJ SC/IM: CPT

## 2018-07-05 PROCEDURE — 85379 FIBRIN DEGRADATION QUANT: CPT

## 2018-07-05 PROCEDURE — 83735 ASSAY OF MAGNESIUM: CPT

## 2018-07-05 PROCEDURE — 36415 COLL VENOUS BLD VENIPUNCTURE: CPT

## 2018-07-05 PROCEDURE — 96361 HYDRATE IV INFUSION ADD-ON: CPT

## 2018-07-05 PROCEDURE — 85025 COMPLETE CBC W/AUTO DIFF WBC: CPT

## 2018-07-05 PROCEDURE — 99285 EMERGENCY DEPT VISIT HI MDM: CPT

## 2018-07-05 PROCEDURE — 84484 ASSAY OF TROPONIN QUANT: CPT

## 2018-07-05 PROCEDURE — 82550 ASSAY OF CK (CPK): CPT

## 2018-07-05 PROCEDURE — 71046 X-RAY EXAM CHEST 2 VIEWS: CPT

## 2018-07-05 PROCEDURE — 96374 THER/PROPH/DIAG INJ IV PUSH: CPT

## 2018-07-05 RX ADMIN — HEPARIN SODIUM SCH: 5000 INJECTION, SOLUTION INTRAVENOUS; SUBCUTANEOUS at 23:12

## 2018-07-05 NOTE — ED
Chest Pain HPI





- General


Chief Complaint: Chest Pain


Stated Complaint: hypertension


Source: patient, EMS


Mode of arrival: EMS


Limitations: no limitations





- History of Present Illness


Initial Comments: 





Dictation was produced using dragon dictation software. please excuse any 

grammatical, word or spelling errors. 





Chief Complaint: 78-year-old  female past medical history of coronary 

artery disease status post coronary artery stent, DVT, hypertension, 

osteoarthritis presents with chest discomfort and hypertension.





History of Present Illness: He states she was at home and she measured her 

blood pressures found to be high with a systolic in the 190s.  She also began 

experiencing some chest discomfort.  She was concerned and called EMS.  She was 

transferred here by EMS.  He was reports that patient was hypertensive upon 

initial evaluation.  Patient reports that her symptoms remind her of when she 

was diagnosed with a mini heart attack approximately one month ago.  Patient 

does complain of associated left upper extremity pain that radiates from the 

shoulder down to her mid forearm.  Patient denies any constitutional symptoms.








Past Medical History: Coronary artery disease, DVT, cancer, and also arthritis, 

mitral valve prolapse


Past Surgical History: Hysterectomy, eye surgery


Social History: [denies alcohol, tobacco or illicit drug use]


Family History: reviewed and noncontributory





The ROS documented in this emergency department record has been reviewed and 

confirmed by me.  Those systems with pertinent positive or negative responses 

have been documented in the HPI.  All other systems are other negative and/or 

noncontributory.





- Related Data


 Home Medications











 Medication  Instructions  Recorded  Confirmed


 


Gabapentin [Neurontin] 300 mg PO HS 18


 


Brimonidine Tartrate [Alphagan P 1 drops BOTH EYES TID 18





0.2% Ophth Soln]   


 


Calcium Carbonate/Vitamin D3 1 tab PO BID 18





[Calcium 600-Vit D3 400 Caplet]   


 


Dorzolamide/Timolol/Pf [Cosopt Pf 1 applicator BOTH EYES TID 18





2%/5% Ophth Droperette]   


 


Furosemide [Lasix] 20 mg PO DAILY PRN 18


 


Potassium 99 mg PO DAILY PRN 18








 Previous Rx's











 Medication  Instructions  Recorded


 


Aspirin EC [Ecotrin Low Dose] 81 mg PO DAILY #30 tablet. 18


 


Atorvastatin [Lipitor] 40 mg PO DAILY #30 tab 18


 


Clopidogrel [Plavix] 75 mg PO DAILY #30 tab 18


 


Metoprolol Tartrate [Lopressor] 25 mg PO BID #60 tab 18


 


Nitroglycerin Sl Tabs [Nitrostat] 0.4 mg SUBLINGUAL Q5M PRN #25 tab 18











 Allergies











Allergy/AdvReac Type Severity Reaction Status Date / Time


 


ibuprofen [From Advil] AdvReac  Nausea & Verified 18 19:46





   Vomiting  














Review of Systems


ROS Statement: 


Those systems with pertinent positive or pertinent negative responses have been 

documented in the HPI.





ROS Other: All systems not noted in ROS Statement are negative.





Past Medical History


Past Medical History: Cancer, Deep Vein Thrombosis (DVT), Hypertension, 

Osteoarthritis (OA)


Additional Past Medical History / Comment(s): mitral valve prolapse.  blind.  

stent placed 2018


History of Any Multi-Drug Resistant Organisms: None Reported


Past Surgical History: Hysterectomy


Additional Past Surgical History / Comment(s): eye surgery, endometrial and 

cervicle CA with radiation


Past Anesthesia/Blood Transfusion Reactions: No Reported Reaction


Past Psychological History: No Psychological Hx Reported


Smoking Status: Never smoker


Past Alcohol Use History: None Reported


Past Drug Use History: None Reported





- Past Family History


  ** Mother


Family Medical History: Cancer, Renal Disease


Additional Family Medical History / Comment(s): uterine CA





  ** Brother(s)


Family Medical History: Cancer


Additional Family Medical History / Comment(s): colon CA





  ** Sister(s)


Family Medical History: Cancer


Additional Family Medical History / Comment(s): colon and breast CA





  ** Father


Family Medical History: Myocardial Infarction (MI)


Additional Family Medical History / Comment(s):  at 54 from MI





General Exam





- General Exam Comments


Initial Comments: 











Vitals: Vital signs upon arrival shows blood pressure of 214/88, so signs 

within normal limits








PHYSICAL EXAM:


General Impression: Alert and oriented x3, not in acute distress


HEENT: Normocephalic atraumatic, extra-ocular movements intact, pupils equal 

and reactive to light bilaterally, mucous membranes moist.


Cardiovascular: Heart regular rate and rhythm, S1&S2 audible, no murmurs, rubs 

or gallops


Chest: Lungs clear to auscultation bilaterally, no rhonchi, no wheeze, no rales


Abdomen: Bowel sounds present, abdomen soft, non-tender, non-distended, no 

organomegaly


Musculoskeletal: Pulses present and equal in all extremities, no peripheral 

edema


Motor: Power 5/5 bilaterally, no focal deficits noted


Neurological: CN II-XII grossly intact, no focal motor or sensory deficits noted


Skin: Intact with no visualized rashes


Psych: Normal affect and mood


Limitations: no limitations





Course


 Vital Signs











  18





  19:14 20:42 21:31


 


Temperature 98.9 F  


 


Pulse Rate 69 68 74


 


Respiratory 18 18 18





Rate   


 


Blood Pressure 214/88 196/79 172/74


 


O2 Sat by Pulse 99 100 99





Oximetry   














Chest Pain MDM





- MDM











ED course: 78-year-old  female with multiple comorbidities presents 

with chest discomfort and hypertension.  Patient not having any signs or 

symptoms to suggest hypertensive crisis at this time.  No clinical suspicion of 

acute aortic dissection, pulmonary edema or intracranial emergency.  Vital 

signs shows severe hypertension.  Over having some chest pain.  Chest pain is 

atypical with typical features.  CBC is unremarkable.  D-dimer is below the 

threshold of 0.6.  metabolic panel shows no acute processes.  Troponin is 

negative..  Repeat vital signs were obtained with mild improvement of blood 

pressure however she still hypertensive.  Dose of hydralazine with improvement 

of blood pressure.  Given multiple comorbidities and history of recent cardiac 

stent we will have patient admitted for inpatient management of blood pressure 

and further cardiac monitoring.  Patient be admitted to Dr. Cannon who requests 

by mouth blood pressure medications, repeat troponin.  Patient given 324 mg of 

aspirin.  At this point chest pain is not consistent with cardiac ischemia.  

EKG shows no findings to suggest ischemia.  Patient is understandable and 

agreeable to plan.








EKG Interpretation:


A 12 lead EKG was obtained. It was interpreted by myself and attending 

physician. There is a P wave before every QRS complex. Rate is 69. Rhythm is 

normal sinus rhythm, AL interval 160, QRS 82, , patient has T-wave 

inversions in aVL that are seen on previous EKG.  QT is not prolonged. No ST 

segment depression or elevation. Overall, this EKG is unremarkable





Disposition


Clinical Impression: 


 Chest pain





Disposition: ADMITTED AS IP TO THIS HOSP


Referrals: 


Ashwin Reynoso DO [Primary Care Provider] - 1-2 days


Time of Disposition: 21:42

## 2018-07-05 NOTE — XR
EXAMINATION TYPE: XR chest 2V

 

DATE OF EXAM: 7/5/2018

 

COMPARISON: 6/1/2018

 

HISTORY: Chest pain

 

TECHNIQUE:  Frontal and lateral views of the chest are obtained.

 

FINDINGS:  There is no heart failure nor confluent pneumonic infiltrate. Heart is probably enlarged. 
There is no pleural effusion. Bony thorax is intact.

 

IMPRESSION:  Mild cardiomegaly without change. No gross heart failure. No change.

## 2018-07-06 RX ADMIN — HEPARIN SODIUM SCH UNIT: 5000 INJECTION, SOLUTION INTRAVENOUS; SUBCUTANEOUS at 15:41

## 2018-07-06 RX ADMIN — BRIMONIDINE TARTRATE SCH DROPS: 2 SOLUTION/ DROPS OPHTHALMIC at 15:41

## 2018-07-06 RX ADMIN — ISOSORBIDE MONONITRATE SCH MG: 30 TABLET, EXTENDED RELEASE ORAL at 11:37

## 2018-07-06 RX ADMIN — METOPROLOL TARTRATE SCH MG: 25 TABLET, FILM COATED ORAL at 11:37

## 2018-07-06 RX ADMIN — ATORVASTATIN CALCIUM SCH MG: 40 TABLET, FILM COATED ORAL at 11:36

## 2018-07-06 RX ADMIN — CLOPIDOGREL BISULFATE SCH MG: 75 TABLET ORAL at 11:37

## 2018-07-06 RX ADMIN — ASPIRIN 81 MG CHEWABLE TABLET SCH MG: 81 TABLET CHEWABLE at 11:36

## 2018-07-06 RX ADMIN — LISINOPRIL SCH MG: 10 TABLET ORAL at 11:37

## 2018-07-06 RX ADMIN — HEPARIN SODIUM SCH UNIT: 5000 INJECTION, SOLUTION INTRAVENOUS; SUBCUTANEOUS at 11:36

## 2018-07-06 RX ADMIN — HEPARIN SODIUM SCH UNIT: 5000 INJECTION, SOLUTION INTRAVENOUS; SUBCUTANEOUS at 22:46

## 2018-07-06 RX ADMIN — BRIMONIDINE TARTRATE SCH DROPS: 2 SOLUTION/ DROPS OPHTHALMIC at 20:51

## 2018-07-06 RX ADMIN — METOPROLOL TARTRATE SCH MG: 25 TABLET, FILM COATED ORAL at 20:50

## 2018-07-06 RX ADMIN — BRIMONIDINE TARTRATE SCH DROPS: 2 SOLUTION/ DROPS OPHTHALMIC at 11:37

## 2018-07-06 NOTE — CONS
CONSULTATION



CHIEF COMPLAINT:

Left arm pain with severe uncontrolled hypertension.  This is a 78-year-old lady with

history of coronary artery disease, status post angioplasty of diagonal branch in June of 2018, hypertension, and dyslipidemia who presented to hospital complaining of left

arm discomfort.  She initially had left arm pain that lasted for about 2 hours.  She

did not take any sublingual nitroglycerin.  It was mild intensity.  She checked her

blood pressures.  Blood pressures were significantly elevated with systolics in the

200.  She was concerned and at that time had mild chest discomfort that was mild

intensity precordial without radiation to neck, back.  Since being admitted to

hospital, she had been symptom-free.  Cardiac enzymes have been negative.  EKG does not

reveal ischemic changes.  Her chest discomfort and left arm pain are probably related

to severe uncontrolled hypertension with underlying CAD.  At the time of my evaluation

this morning, she is pain free hemodynamically stable.  Blood pressures are better

controlled.  She is on amlodipine 5 mg daily, which I am going to increase to 10

aspirin Lipitor Plavix Zestril 10 mg daily, and Lopressor.  I am going to add Imdur 30

mg daily.



PAST MEDICAL HISTORY:

Negative is significant for CAD status post angioplasty hypertension dyslipidemia.



MEDICATIONS:

Include Lopressor 25 b.i.d., Lasix 20 daily, Neurontin, Plavix 75 daily Lipitor and

aspirin.



ALLERGIC:

To Advil.



FAMILY HISTORY:

Negative for premature coronary artery disease.



SOCIAL HISTORY:

Negative for current smoking, EtOH abuse or drug abuse.



REVIEW OF SYSTEMS:

HEENT is unremarkable cardiac as described above respiratory negative GI negative

anterior negative.  Allergy none skin negative.  Musculoskeletal significant for

arthritis psychosocial negative endocrine no blood under constitutional negative.

Oncological negative rest of the system review is not relevant.



PHYSICAL EXAM:

On exam, patient is comfortable at rest.  Blood pressure is better controlled.  There

is no jugular venous distention.  Carotid upstroke is normal.  There is no bruit chest

exam reveals good air entry bilaterally.  Heart exam reveals first and second heart

sounds and a systolic murmur at the apex.  Abdomen is soft.  Exam of extremities did

not reveal any edema.  Peripheral pulses are felt.



LABS:

Show a hemoglobin of 13.4, platelet count of 203.  Potassium is 4.8, creatinine is 0.9.

Three sets of troponins are negative.



ASSESSMENT:

1. Severe uncontrolled hypertension.

2. Precordial chest pain in a patient with known CAD.

3. Status post recent angioplasty.



PLAN:

Patient's chest discomfort could be related to severe uncontrolled hypertension.  She

is pain free now.  I will optimally control her blood pressures.  Ambulate her.  If she

is feeling well, may be home tomorrow morning and outpatient followup with Dr. Tobin.

If she has further episodes of chest discomfort, we may have to consider cardiac

catheterization.





MMODL / IJN: [id number] / Job#: [id number]

## 2018-07-07 VITALS — DIASTOLIC BLOOD PRESSURE: 59 MMHG | HEART RATE: 67 BPM | SYSTOLIC BLOOD PRESSURE: 128 MMHG

## 2018-07-07 VITALS — RESPIRATION RATE: 16 BRPM | TEMPERATURE: 98 F

## 2018-07-07 RX ADMIN — ASPIRIN 81 MG CHEWABLE TABLET SCH MG: 81 TABLET CHEWABLE at 08:18

## 2018-07-07 RX ADMIN — METOPROLOL TARTRATE SCH MG: 25 TABLET, FILM COATED ORAL at 08:19

## 2018-07-07 RX ADMIN — ISOSORBIDE MONONITRATE SCH MG: 30 TABLET, EXTENDED RELEASE ORAL at 08:18

## 2018-07-07 RX ADMIN — BRIMONIDINE TARTRATE SCH DROPS: 2 SOLUTION/ DROPS OPHTHALMIC at 08:17

## 2018-07-07 RX ADMIN — ATORVASTATIN CALCIUM SCH MG: 40 TABLET, FILM COATED ORAL at 08:18

## 2018-07-07 RX ADMIN — HEPARIN SODIUM SCH UNIT: 5000 INJECTION, SOLUTION INTRAVENOUS; SUBCUTANEOUS at 08:18

## 2018-07-07 RX ADMIN — LISINOPRIL SCH MG: 10 TABLET ORAL at 08:19

## 2018-07-07 RX ADMIN — CLOPIDOGREL BISULFATE SCH MG: 75 TABLET ORAL at 08:18

## 2018-07-07 NOTE — P.PN
Subjective


Progress Note Date: 07/07/18


Principal diagnosis: 





Hypertension emergency





This is a pleasant 78-year-old  female patient with a past medical 

history significant for CAD and status post PCI of the diagonal branch of the 

LAD in June 2018 as well as hypertension presented to the hospital was left arm 

discomfort and uncontrolled hypertension.  She was diagnosed with hypertension 

emergency.





On follow-up with her today, she is asymptomatic and denies having any chest 

pain or discomfort, shortness of breath, dizziness or lightheadedness, or 

syncope.





The blood pressure seems to be well-controlled on the current medical regimen.  

The cardiac enzymes are negative.  The EKG did not show any ischemia.





From the cardiovascular standpoint overview, the patient can be discharged home.





Objective





- Vital Signs


Vital signs: 


 Vital Signs











Temp  98 F   07/07/18 08:15


 


Pulse  85   07/07/18 08:15


 


Resp  16   07/07/18 08:15


 


BP  116/55   07/07/18 08:15


 


Pulse Ox  99   07/07/18 08:15








 Intake & Output











 07/06/18 07/07/18 07/07/18





 18:59 06:59 18:59


 


Intake Total 360  


 


Balance 360  


 


Weight  55.2 kg 


 


Intake:   


 


  Oral 360  


 


Other:   


 


  Voiding Method Toilet Toilet Toilet


 


  # Voids 2 2 2


 


  # Bowel Movements 0  














- Constitutional


General appearance: Present: no acute distress





- Respiratory


Respiratory: bilateral: CTA





- Cardiovascular


Rhythm: regular


Heart sounds: normal: S1, S2





- Labs


CBC & Chem 7: 


 07/05/18 19:47





 07/05/18 19:47





Assessment and Plan


Assessment: 





Assessment


#1 hypertension emergency


#2 chest discomfort secondary to the above


#3 coronary artery disease and status post PCI





Plan


#1 the blood pressure seems to be well-controlled


#2 the patient is chest pain free at this point


#3 from the cardiac vascular standpoint of view, the patient can be discharged 

home.

## 2018-07-07 NOTE — P.DS
Providers


Date of admission: 


07/05/18 21:30





Attending physician: 


Deborah Cannon





Consults: 





 





07/06/18 09:54


Consult Physician Routine 


   Consulting Provider: Kali Adams


   Consult Reason/Comments: chest pain, htn


   Do you want consulting provider notified?: Yes











Primary care physician: 


Ashwin Reynoso





[facility] Course: 





This is a 78-year-old  female patient of Dr. Reynoso with a 

previous medical history significant for hypertension and hypertensive cardio 

vascular disease with left ventricular hypertrophy, hyperlipidemia, history of 

uterine cancer back in 2010, legal blindness due to glaucoma, mitral valve 

prolapse, also history of DVT.  Patient had a recent hospitalization beginning 

of June for accelerated hypertension was found to have elevated troponins with 

acute coronary syndrome underwent heart catheterization and stenting of the 

first diagonal branch with Dr. Tobin.  Patient states that she has followed up 

with Dr. Tobin and has had no other new medications since her stent was 

placed.  Her blood pressure at home in the morning usually runs 120 systolic or 

lower and she states in the afternoon highest is 143 until last evening.  Her 

blood pressure was in the 190s.  She does state that she took her medications 

as scheduled.  She had developed left arm pain as well did not take 

nitroglycerin at home.  It lasted about an hour.  She denies any nausea, cold 

sweats, choking with eating.  She does state that her whole body becomes weak 

once in a while but no specific sided weakness.  She states she has had a 

chronic cough for year that she thinks is due to gabapentin.  She denies having 

any fever.





Patient came into [facility] emergency center for evaluation.  

Her blood pressure was 214/88.  Patient then also had chest discomfort.  EKG 

showed no acute ST changes.  Troponins have been 0.012, 0.030, 0.024.  White 

count was normal at 5.2, hemoglobin 13.4, electrolytes within normal limits, 

creatinine 0.94.  Blood sugar 106.  Chest x-ray shows mild cardiomegaly without 

change.  No gross heart failure.  No change.  In the emergency center, patient 

was given a dose of amlodipine 5 mg, full dose aspirin, hydralazine 10 mg IV 

push, lisinopril 10 mg with improvement of her blood pressure and patient was 

admitted to the selective care unit and cardiology consult requested.





7/7 patient doing well with no chest pain or shortness of breath.  Cardiology 

evaluated the patient states the left arm discomfort is likely secondary to 

hypertensive urgency and atypical for ACS.  Patient stable to be discharged home


Discharge diagnoses





1.  Left arm pain, chest pain secondary to uncontrolled hypertension


2.  Coronary artery disease status post recent stent in June 2018. 


3.  Hypertension.  


4.  History of Endometrial cancer back in 2010 with radiation colitis. 


5.  Hyperlipidemia.


6.  Peripheral neuropathy.





Disposition home with self-care


9 CC a copy of discharge to Dr. Graves


Patient Condition at Discharge: Stable





Plan - Discharge Summary


Discharge Rx Participant: No


New Discharge Prescriptions: 


New


   amLODIPine [Norvasc] 5 mg PO DAILY #30 tab


   Isosorbide Mononitrate ER [Imdur] 30 mg PO DAILY #30 tab.er.24h


   Lisinopril [Zestril] 10 mg PO DAILY #30 tab





Continue


   Gabapentin [Neurontin] 300 mg PO HS


   Brimonidine Tartrate [Alphagan P 0.2% Ophth Soln] 1 drops BOTH EYES TID


   Furosemide [Lasix] 20 mg PO DAILY PRN


     PRN Reason: Edema


   Dorzolamide/Timolol/Pf [Cosopt Pf 2%/5% Ophth Droperette] 1 applicator BOTH 

EYES TID


   Calcium Carbonate/Vitamin D3 [Calcium 600-Vit D3 400 Caplet] 1 tab PO BID


   Atorvastatin [Lipitor] 40 mg PO DAILY #30 tab


   Clopidogrel [Plavix] 75 mg PO DAILY #30 tab


   Metoprolol Tartrate [Lopressor] 25 mg PO BID #60 tab


   Nitroglycerin Sl Tabs [Nitrostat] 0.4 mg SUBLINGUAL Q5M PRN #25 tab


     PRN Reason: Chest Pain


   Aspirin EC [Ecotrin Low Dose] 81 mg PO DAILY #30 tablet.dr





Discontinued


   Potassium 99 mg PO DAILY PRN


     PRN Reason: Edema


Discharge Medication List





Gabapentin [Neurontin] 300 mg PO HS 01/16/18 [History]


Brimonidine Tartrate [Alphagan P 0.2% Ophth Soln] 1 drops BOTH EYES TID 04/13/ 18 [History]


Calcium Carbonate/Vitamin D3 [Calcium 600-Vit D3 400 Caplet] 1 tab PO BID 04/13/ 18 [History]


Dorzolamide/Timolol/Pf [Cosopt Pf 2%/5% Ophth Droperette] 1 applicator BOTH 

EYES TID 04/13/18 [History]


Furosemide [Lasix] 20 mg PO DAILY PRN 04/13/18 [History]


Aspirin EC [Ecotrin Low Dose] 81 mg PO DAILY #30 tablet. 06/02/18 [Rx]


Atorvastatin [Lipitor] 40 mg PO DAILY #30 tab 06/02/18 [Rx]


Clopidogrel [Plavix] 75 mg PO DAILY #30 tab 06/02/18 [Rx]


Metoprolol Tartrate [Lopressor] 25 mg PO BID #60 tab 06/02/18 [Rx]


Nitroglycerin Sl Tabs [Nitrostat] 0.4 mg SUBLINGUAL Q5M PRN #25 tab 06/02/18 [Rx

]


Isosorbide Mononitrate ER [Imdur] 30 mg PO DAILY #30 tab.er.24h 07/07/18 [Rx]


Lisinopril [Zestril] 10 mg PO DAILY #30 tab 07/07/18 [Rx]


amLODIPine [Norvasc] 5 mg PO DAILY #30 tab 07/07/18 [Rx]








Follow up Appointment(s)/Referral(s): 


Miguel Tobin MD [Family Provider] - 1 Week (Cardiology office will call you 

to set up appointment)


Ashwin Reynoso DO [Primary Care Provider] - 1-2 days


Patient Instructions/Handouts:  Chest Pain (DC), Hypertension (DC)


Discharge Disposition: HOME SELF-CARE

## 2018-07-11 ENCOUNTER — HOSPITAL ENCOUNTER (OUTPATIENT)
Dept: HOSPITAL 47 - RADUSWWP | Age: 78
Discharge: HOME | End: 2018-07-11
Payer: MEDICARE

## 2018-07-11 DIAGNOSIS — I25.10: Primary | ICD-10-CM

## 2018-07-11 DIAGNOSIS — R53.83: ICD-10-CM

## 2018-07-11 DIAGNOSIS — I25.82: ICD-10-CM

## 2018-07-11 PROCEDURE — 93880 EXTRACRANIAL BILAT STUDY: CPT

## 2018-08-09 ENCOUNTER — HOSPITAL ENCOUNTER (OUTPATIENT)
Dept: HOSPITAL 47 - LABWHC1 | Age: 78
End: 2018-08-09
Payer: MEDICARE

## 2018-08-09 DIAGNOSIS — E78.2: Primary | ICD-10-CM

## 2018-08-09 LAB
ALBUMIN SERPL-MCNC: 3.5 G/DL (ref 3.5–5)
ALP SERPL-CCNC: 71 U/L (ref 38–126)
ALT SERPL-CCNC: 28 U/L (ref 9–52)
ANION GAP SERPL CALC-SCNC: 6 MMOL/L
AST SERPL-CCNC: 23 U/L (ref 14–36)
BUN SERPL-SCNC: 14 MG/DL (ref 7–17)
CALCIUM SPEC-MCNC: 9.1 MG/DL (ref 8.4–10.2)
CHLORIDE SERPL-SCNC: 108 MMOL/L (ref 98–107)
CHOLEST SERPL-MCNC: 109 MG/DL (ref ?–200)
CO2 SERPL-SCNC: 29 MMOL/L (ref 22–30)
GLUCOSE SERPL-MCNC: 92 MG/DL (ref 74–99)
HDLC SERPL-MCNC: 44 MG/DL (ref 40–60)
LDLC SERPL CALC-MCNC: 48 MG/DL (ref 0–99)
POTASSIUM SERPL-SCNC: 4.2 MMOL/L (ref 3.5–5.1)
PROT SERPL-MCNC: 6.1 G/DL (ref 6.3–8.2)
SODIUM SERPL-SCNC: 143 MMOL/L (ref 137–145)
TRIGL SERPL-MCNC: 86 MG/DL (ref ?–150)

## 2018-08-09 PROCEDURE — 80061 LIPID PANEL: CPT

## 2018-08-09 PROCEDURE — 36415 COLL VENOUS BLD VENIPUNCTURE: CPT

## 2018-08-09 PROCEDURE — 80053 COMPREHEN METABOLIC PANEL: CPT

## 2018-09-09 ENCOUNTER — HOSPITAL ENCOUNTER (OUTPATIENT)
Dept: HOSPITAL 47 - EC | Age: 78
Setting detail: OBSERVATION
LOS: 1 days | Discharge: HOME | End: 2018-09-10
Attending: INTERNAL MEDICINE | Admitting: INTERNAL MEDICINE
Payer: MEDICARE

## 2018-09-09 VITALS — BODY MASS INDEX: 21.1 KG/M2

## 2018-09-09 DIAGNOSIS — Z80.0: ICD-10-CM

## 2018-09-09 DIAGNOSIS — I34.1: ICD-10-CM

## 2018-09-09 DIAGNOSIS — I50.22: ICD-10-CM

## 2018-09-09 DIAGNOSIS — M19.90: ICD-10-CM

## 2018-09-09 DIAGNOSIS — I25.10: ICD-10-CM

## 2018-09-09 DIAGNOSIS — Z88.8: ICD-10-CM

## 2018-09-09 DIAGNOSIS — R07.89: Primary | ICD-10-CM

## 2018-09-09 DIAGNOSIS — E78.5: ICD-10-CM

## 2018-09-09 DIAGNOSIS — Z79.82: ICD-10-CM

## 2018-09-09 DIAGNOSIS — Z85.42: ICD-10-CM

## 2018-09-09 DIAGNOSIS — Z95.5: ICD-10-CM

## 2018-09-09 DIAGNOSIS — H40.9: ICD-10-CM

## 2018-09-09 DIAGNOSIS — Z85.41: ICD-10-CM

## 2018-09-09 DIAGNOSIS — G62.9: ICD-10-CM

## 2018-09-09 DIAGNOSIS — Z92.3: ICD-10-CM

## 2018-09-09 DIAGNOSIS — I16.1: ICD-10-CM

## 2018-09-09 DIAGNOSIS — J44.9: ICD-10-CM

## 2018-09-09 DIAGNOSIS — Z80.3: ICD-10-CM

## 2018-09-09 DIAGNOSIS — Z80.49: ICD-10-CM

## 2018-09-09 DIAGNOSIS — Z79.899: ICD-10-CM

## 2018-09-09 DIAGNOSIS — I25.2: ICD-10-CM

## 2018-09-09 DIAGNOSIS — Z86.718: ICD-10-CM

## 2018-09-09 DIAGNOSIS — I11.0: ICD-10-CM

## 2018-09-09 DIAGNOSIS — H54.8: ICD-10-CM

## 2018-09-09 DIAGNOSIS — Z79.02: ICD-10-CM

## 2018-09-09 LAB
ALBUMIN SERPL-MCNC: 3.9 G/DL (ref 3.5–5)
ALP SERPL-CCNC: 100 U/L (ref 38–126)
ALT SERPL-CCNC: 31 U/L (ref 9–52)
ANION GAP SERPL CALC-SCNC: 10 MMOL/L
APTT BLD: 20.8 SEC (ref 22–30)
AST SERPL-CCNC: 30 U/L (ref 14–36)
BASOPHILS # BLD AUTO: 0 K/UL (ref 0–0.2)
BASOPHILS NFR BLD AUTO: 0 %
BUN SERPL-SCNC: 17 MG/DL (ref 7–17)
CALCIUM SPEC-MCNC: 9.9 MG/DL (ref 8.4–10.2)
CHLORIDE SERPL-SCNC: 106 MMOL/L (ref 98–107)
CK SERPL-CCNC: 42 U/L (ref 30–135)
CO2 SERPL-SCNC: 28 MMOL/L (ref 22–30)
EOSINOPHIL # BLD AUTO: 0.2 K/UL (ref 0–0.7)
EOSINOPHIL NFR BLD AUTO: 4 %
ERYTHROCYTE [DISTWIDTH] IN BLOOD BY AUTOMATED COUNT: 4.56 M/UL (ref 3.8–5.4)
ERYTHROCYTE [DISTWIDTH] IN BLOOD: 14.9 % (ref 11.5–15.5)
GLUCOSE SERPL-MCNC: 113 MG/DL (ref 74–99)
HCT VFR BLD AUTO: 44.1 % (ref 34–46)
HGB BLD-MCNC: 13.5 GM/DL (ref 11.4–16)
INR PPP: 1 (ref ?–1.2)
LIPASE SERPL-CCNC: 269 U/L (ref 23–300)
LYMPHOCYTES # SPEC AUTO: 1.2 K/UL (ref 1–4.8)
LYMPHOCYTES NFR SPEC AUTO: 23 %
MAGNESIUM SPEC-SCNC: 1.8 MG/DL (ref 1.6–2.3)
MCH RBC QN AUTO: 29.7 PG (ref 25–35)
MCHC RBC AUTO-ENTMCNC: 30.7 G/DL (ref 31–37)
MCV RBC AUTO: 96.7 FL (ref 80–100)
MONOCYTES # BLD AUTO: 0.4 K/UL (ref 0–1)
MONOCYTES NFR BLD AUTO: 7 %
NEUTROPHILS # BLD AUTO: 3.2 K/UL (ref 1.3–7.7)
NEUTROPHILS NFR BLD AUTO: 63 %
PLATELET # BLD AUTO: 214 K/UL (ref 150–450)
POTASSIUM SERPL-SCNC: 3.6 MMOL/L (ref 3.5–5.1)
PROT SERPL-MCNC: 7.2 G/DL (ref 6.3–8.2)
PT BLD: 9.9 SEC (ref 9–12)
SODIUM SERPL-SCNC: 144 MMOL/L (ref 137–145)
TROPONIN I SERPL-MCNC: <0.012 NG/ML (ref 0–0.03)
WBC # BLD AUTO: 5.1 K/UL (ref 3.8–10.6)

## 2018-09-09 PROCEDURE — 96376 TX/PRO/DX INJ SAME DRUG ADON: CPT

## 2018-09-09 PROCEDURE — 85049 AUTOMATED PLATELET COUNT: CPT

## 2018-09-09 PROCEDURE — 85730 THROMBOPLASTIN TIME PARTIAL: CPT

## 2018-09-09 PROCEDURE — 93017 CV STRESS TEST TRACING ONLY: CPT

## 2018-09-09 PROCEDURE — 80061 LIPID PANEL: CPT

## 2018-09-09 PROCEDURE — 99291 CRITICAL CARE FIRST HOUR: CPT

## 2018-09-09 PROCEDURE — 96365 THER/PROPH/DIAG IV INF INIT: CPT

## 2018-09-09 PROCEDURE — 85025 COMPLETE CBC W/AUTO DIFF WBC: CPT

## 2018-09-09 PROCEDURE — 93306 TTE W/DOPPLER COMPLETE: CPT

## 2018-09-09 PROCEDURE — 82553 CREATINE MB FRACTION: CPT

## 2018-09-09 PROCEDURE — 71046 X-RAY EXAM CHEST 2 VIEWS: CPT

## 2018-09-09 PROCEDURE — 93005 ELECTROCARDIOGRAM TRACING: CPT

## 2018-09-09 PROCEDURE — 84484 ASSAY OF TROPONIN QUANT: CPT

## 2018-09-09 PROCEDURE — 83880 ASSAY OF NATRIURETIC PEPTIDE: CPT

## 2018-09-09 PROCEDURE — 85610 PROTHROMBIN TIME: CPT

## 2018-09-09 PROCEDURE — 83735 ASSAY OF MAGNESIUM: CPT

## 2018-09-09 PROCEDURE — 80053 COMPREHEN METABOLIC PANEL: CPT

## 2018-09-09 PROCEDURE — 96366 THER/PROPH/DIAG IV INF ADDON: CPT

## 2018-09-09 PROCEDURE — 82550 ASSAY OF CK (CPK): CPT

## 2018-09-09 PROCEDURE — 96375 TX/PRO/DX INJ NEW DRUG ADDON: CPT

## 2018-09-09 PROCEDURE — 78452 HT MUSCLE IMAGE SPECT MULT: CPT

## 2018-09-09 PROCEDURE — 36415 COLL VENOUS BLD VENIPUNCTURE: CPT

## 2018-09-09 PROCEDURE — 83690 ASSAY OF LIPASE: CPT

## 2018-09-09 PROCEDURE — 84443 ASSAY THYROID STIM HORMONE: CPT

## 2018-09-09 NOTE — XR
EXAMINATION TYPE: XR chest 2V

 

DATE OF EXAM: 9/9/2018

 

COMPARISON: Chest x-ray July 5, 2018. CTA chest June 1, 2018.

 

HISTORY: Chest pain with elevated blood pressure and dizziness

 

TECHNIQUE:  Frontal and lateral views of the chest are obtained.

 

FINDINGS:  There is chronic parenchymal change without suspicious focal air space opacity, pleural ef
fusion, or pneumothorax seen.  The cardiac silhouette size remains mildly enlarged with right atrial 
dilatation redemonstrated. Underlying scoliosis is again seen.

 

IMPRESSION:  Chronic changes and cardiomegaly without acute pulmonary process.

## 2018-09-09 NOTE — ED
General Adult HPI





- General


Chief complaint: Chest Pain


Stated complaint: chest pains, HTN


Time Seen by Provider: 18 19:41


Source: patient, RN notes reviewed, old records reviewed


Mode of arrival: ambulatory


Limitations: no limitations





- History of Present Illness


Initial comments: 





This is a 78-year-old female to the ER for evaluation she presents today for 

evaluation of chest pain chest pain severe hypertension.  Patient does have 

history of heart disease history of COPD history of stent recent stent 

placement.  No pain in chest until today.  Patient has been dull today with 

heaviness on her chest





- Related Data


 Home Medications











 Medication  Instructions  Recorded  Confirmed


 


Gabapentin [Neurontin] 300 mg PO HS 18


 


Brimonidine Tartrate [Alphagan P 1 drops BOTH EYES TID 18





0.2% Ophth Soln]   


 


Calcium Carbonate/Vitamin D3 1 tab PO BID 18





[Calcium 600-Vit D3 400 Caplet]   


 


Dorzolamide/Timolol/Pf [Cosopt Pf 1 applicator BOTH EYES TID 18





2%/5% Ophth Droperette]   


 


Furosemide [Lasix] 20 mg PO DAILY PRN 18


 


Docusate Sodium [Dok] 100 mg PO DAILY 18


 


Potassium Chloride ER [K-Dur 10] 10 meq PO DAILY 18


 


amLODIPine [Norvasc] 2.5 mg PO DAILY 18








 Previous Rx's











 Medication  Instructions  Recorded


 


Aspirin EC [Ecotrin Low Dose] 81 mg PO DAILY #30 tablet. 18


 


Atorvastatin [Lipitor] 40 mg PO DAILY #30 tab 18


 


Clopidogrel [Plavix] 75 mg PO DAILY #30 tab 18


 


Metoprolol Tartrate [Lopressor] 25 mg PO BID #60 tab 18


 


Nitroglycerin Sl Tabs [Nitrostat] 0.4 mg SUBLINGUAL Q5M PRN #25 tab 18


 


Isosorbide Mononitrate ER [Imdur] 30 mg PO DAILY #30 tab.er.24h 18











 Allergies











Allergy/AdvReac Type Severity Reaction Status Date / Time


 


ibuprofen [From Advil] AdvReac  Nausea & Verified 18 19:37





   Vomiting  














Review of Systems


ROS Statement: 


Those systems with pertinent positive or pertinent negative responses have been 

documented in the HPI.





ROS Other: All systems not noted in ROS Statement are negative.





Past Medical History


Past Medical History: Coronary Artery Disease (CAD), Cancer, Deep Vein 

Thrombosis (DVT), Hypertension, Osteoarthritis (OA)


Additional Past Medical History / Comment(s): mitral valve prolapse, legal 

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 

history of DVT, stent placed 2018


History of Any Multi-Drug Resistant Organisms: None Reported


Past Surgical History: Heart Catheterization With Stent, Hysterectomy


Additional Past Surgical History / Comment(s): eye surgery, endometrial and 

cervical cancer with radiation


Past Anesthesia/Blood Transfusion Reactions: No Reported Reaction


Past Psychological History: No Psychological Hx Reported


Smoking Status: Never smoker


Past Alcohol Use History: None Reported


Past Drug Use History: None Reported





- Past Family History


  ** Mother


Family Medical History: Cancer, Renal Disease


Additional Family Medical History / Comment(s): uterine CA





  ** Brother(s)


Family Medical History: Cancer


Additional Family Medical History / Comment(s): colon CA





  ** Sister(s)


Family Medical History: Cancer


Additional Family Medical History / Comment(s): colon and breast CA





  ** Father


Family Medical History: Myocardial Infarction (MI)


Additional Family Medical History / Comment(s):  at 54 from MI





General Exam


Limitations: no limitations


General appearance: alert, in no apparent distress


Head exam: Present: atraumatic, normocephalic, normal inspection


Eye exam: Present: normal appearance, PERRL, EOMI.  Absent: scleral icterus, 

conjunctival injection, periorbital swelling


ENT exam: Present: normal exam, mucous membranes moist


Neck exam: Present: normal inspection.  Absent: tenderness, meningismus, 

lymphadenopathy


Respiratory exam: Present: normal lung sounds bilaterally.  Absent: respiratory 

distress, wheezes, rales, rhonchi, stridor


Cardiovascular Exam: Present: regular rate, normal rhythm, normal heart sounds.

  Absent: systolic murmur, diastolic murmur, rubs, gallop, clicks


GI/Abdominal exam: Present: soft, normal bowel sounds.  Absent: distended, 

tenderness, guarding, rebound, rigid


Extremities exam: Present: normal inspection, full ROM, normal capillary 

refill.  Absent: tenderness, pedal edema, joint swelling, calf tenderness


Back exam: Present: normal inspection


Neurological exam: Present: alert, oriented X3, CN II-XII intact


Psychiatric exam: Present: normal affect, normal mood


Skin exam: Present: warm, dry, intact, normal color.  Absent: rash





Course


 Vital Signs











  18





  19:33 19:40 19:57


 


Temperature 98.2 F  


 


Pulse Rate 71  73


 


Respiratory 16 18 18





Rate   


 


Blood Pressure 197/88  206/116


 


O2 Sat by Pulse 100  98





Oximetry   














  18





  20:12 21:05 21:28


 


Temperature   


 


Pulse Rate 73 74 66


 


Respiratory 18 18 18





Rate   


 


Blood Pressure 183/78 199/86 187/81


 


O2 Sat by Pulse 99 99 99





Oximetry   














- Reevaluation(s)


Reevaluation #1: 





18 21:53


Record is reviewed which did show positive history of stent placement





EKG Findings





- EKG Comments:


EKG Findings:: EKG shows sinus rhythm rate of 72, , QRS 82, QTc 429





Medical Decision Making





- Medical Decision Making





78 female positive chest pain current chest pain, elevated blood pressure which 

is normal.  Patient be admitted for cardiology evaluation.





- Lab Data


Result diagrams: 


 18 19:57





 18 19:57


 Lab Results











  18 Range/Units





  19:57 19:57 19:57 


 


WBC   5.1   (3.8-10.6)  k/uL


 


RBC   4.56   (3.80-5.40)  m/uL


 


Hgb   13.5   (11.4-16.0)  gm/dL


 


Hct   44.1   (34.0-46.0)  %


 


MCV   96.7  D   (80.0-100.0)  fL


 


MCH   29.7   (25.0-35.0)  pg


 


MCHC   30.7 L   (31.0-37.0)  g/dL


 


RDW   14.9   (11.5-15.5)  %


 


Plt Count   214   (150-450)  k/uL


 


Neutrophils %   63   %


 


Lymphocytes %   23   %


 


Monocytes %   7   %


 


Eosinophils %   4   %


 


Basophils %   0   %


 


Neutrophils #   3.2   (1.3-7.7)  k/uL


 


Lymphocytes #   1.2   (1.0-4.8)  k/uL


 


Monocytes #   0.4   (0-1.0)  k/uL


 


Eosinophils #   0.2   (0-0.7)  k/uL


 


Basophils #   0.0   (0-0.2)  k/uL


 


Hypochromasia   Slight   


 


PT     (9.0-12.0)  sec


 


INR     (<1.2)  


 


APTT     (22.0-30.0)  sec


 


Sodium    144  (137-145)  mmol/L


 


Potassium    3.6  (3.5-5.1)  mmol/L


 


Chloride    106  ()  mmol/L


 


Carbon Dioxide    28  (22-30)  mmol/L


 


Anion Gap    10  mmol/L


 


BUN    17  (7-17)  mg/dL


 


Creatinine    0.74  (0.52-1.04)  mg/dL


 


Est GFR (CKD-EPI)AfAm    >90  (>60 ml/min/1.73 sqM)  


 


Est GFR (CKD-EPI)NonAf    79  (>60 ml/min/1.73 sqM)  


 


Glucose    113 H  (74-99)  mg/dL


 


Calcium    9.9  (8.4-10.2)  mg/dL


 


Magnesium    1.8  (1.6-2.3)  mg/dL


 


Total Bilirubin    0.6  (0.2-1.3)  mg/dL


 


AST    30  (14-36)  U/L


 


ALT    31  (9-52)  U/L


 


Alkaline Phosphatase    100  ()  U/L


 


Total Creatine Kinase  42    ()  U/L


 


CK-MB (CK-2)  0.9    (0.0-2.4)  ng/mL


 


CK-MB (CK-2) Rel Index  2.1    


 


Troponin I  <0.012    (0.000-0.034)  ng/mL


 


NT-Pro-B Natriuret Pep     pg/mL


 


Total Protein    7.2  (6.3-8.2)  g/dL


 


Albumin    3.9  (3.5-5.0)  g/dL


 


Lipase    269  ()  U/L














  18 Range/Units





  19:57 19:57 


 


WBC    (3.8-10.6)  k/uL


 


RBC    (3.80-5.40)  m/uL


 


Hgb    (11.4-16.0)  gm/dL


 


Hct    (34.0-46.0)  %


 


MCV    (80.0-100.0)  fL


 


MCH    (25.0-35.0)  pg


 


MCHC    (31.0-37.0)  g/dL


 


RDW    (11.5-15.5)  %


 


Plt Count    (150-450)  k/uL


 


Neutrophils %    %


 


Lymphocytes %    %


 


Monocytes %    %


 


Eosinophils %    %


 


Basophils %    %


 


Neutrophils #    (1.3-7.7)  k/uL


 


Lymphocytes #    (1.0-4.8)  k/uL


 


Monocytes #    (0-1.0)  k/uL


 


Eosinophils #    (0-0.7)  k/uL


 


Basophils #    (0-0.2)  k/uL


 


Hypochromasia    


 


PT  9.9   (9.0-12.0)  sec


 


INR  1.0   (<1.2)  


 


APTT  20.8 L   (22.0-30.0)  sec


 


Sodium    (137-145)  mmol/L


 


Potassium    (3.5-5.1)  mmol/L


 


Chloride    ()  mmol/L


 


Carbon Dioxide    (22-30)  mmol/L


 


Anion Gap    mmol/L


 


BUN    (7-17)  mg/dL


 


Creatinine    (0.52-1.04)  mg/dL


 


Est GFR (CKD-EPI)AfAm    (>60 ml/min/1.73 sqM)  


 


Est GFR (CKD-EPI)NonAf    (>60 ml/min/1.73 sqM)  


 


Glucose    (74-99)  mg/dL


 


Calcium    (8.4-10.2)  mg/dL


 


Magnesium    (1.6-2.3)  mg/dL


 


Total Bilirubin    (0.2-1.3)  mg/dL


 


AST    (14-36)  U/L


 


ALT    (9-52)  U/L


 


Alkaline Phosphatase    ()  U/L


 


Total Creatine Kinase    ()  U/L


 


CK-MB (CK-2)    (0.0-2.4)  ng/mL


 


CK-MB (CK-2) Rel Index    


 


Troponin I    (0.000-0.034)  ng/mL


 


NT-Pro-B Natriuret Pep   285  pg/mL


 


Total Protein    (6.3-8.2)  g/dL


 


Albumin    (3.5-5.0)  g/dL


 


Lipase    ()  U/L














- Radiology Data


Radiology results: report reviewed (Chest x-rays negative for acute disease), 

image reviewed





Critical Care Time


Critical Care Time: Yes


Total Critical Care Time: 31





Disposition


Clinical Impression: 


 Hypertension, CAD (coronary artery disease), Chest pain





Disposition: ADMITTED AS IP TO THIS HOSP


Condition: Undetermined


Instructions:  Chest Pain (ED)


Is patient prescribed a controlled substance at d/c from ED?: No


Referrals: 


Ashwin Reynoso DO [Primary Care Provider] - 1-2 days

## 2018-09-10 VITALS — HEART RATE: 64 BPM | SYSTOLIC BLOOD PRESSURE: 127 MMHG | DIASTOLIC BLOOD PRESSURE: 74 MMHG | TEMPERATURE: 98.2 F

## 2018-09-10 VITALS — RESPIRATION RATE: 16 BRPM

## 2018-09-10 LAB
CHOLEST SERPL-MCNC: 116 MG/DL (ref ?–200)
CK SERPL-CCNC: 31 U/L (ref 30–135)
CK SERPL-CCNC: 35 U/L (ref 30–135)
HDLC SERPL-MCNC: 48 MG/DL (ref 40–60)
LDLC SERPL CALC-MCNC: 50 MG/DL (ref 0–99)
PLATELET # BLD AUTO: 198 K/UL (ref 150–450)
TRIGL SERPL-MCNC: 88 MG/DL (ref ?–150)
TROPONIN I SERPL-MCNC: <0.012 NG/ML (ref 0–0.03)
TROPONIN I SERPL-MCNC: <0.012 NG/ML (ref 0–0.03)

## 2018-09-10 NOTE — P.CRDCN
History of Present Illness


History of present illness: 


Mrs. Sullivan is a pleasant 78-year-old  female past medical history 

significant for coronary artery disease s/p stenting of diagonal branch 2018 

with 40% disease of mid to distal RCA, hypertension and systolic heart failure 

with EF 45%. She follows with Dr. Tobin in the office.  She presented to the 

hospital  with symptoms of angina and an STEMI.  We have been asked to 

see her in consultation for chest pain and elevated blood pressure. She states 

yesterday she started feeling a heavy sensation in the mid-sternal region with 

no radiation to arm, back, neck or jaw. She checked her bp and it was elevated 

200/101. She states she had taken all of her medication as prescribed that day. 

She states that recently Dr. Graves had discontinued her lisinopril 

because her blood pressure had been running low. Upon arrival to ED blood 

pressure was 206/116. IV labetolol was given x2.  Repeat blood pressure this 

morning prior to medication administration 160/83 heart rate 69.  She continues 

to complain of mild chest discomfort in the midsternal region.  She denies 

shortness of breath, dizziness, palpitations, nausea, vomiting or diaphoresis.





EKG reveals sinus mechanism with poor R-wave progression.


Chest x-ray negative for an acute cardiopulmonary process.


Laboratory data reviewed, hemoglobin 13.5, platelets 198, sodium 144, potassium 

3.6, magnesium 1.8, creatinine 0.74, cardiac enzymes negative 3, LDL 50, HDL 48

, TSH 3.27.  Free T4 is pending.


Most recent echocardiogram performed in 2018 reveals decreased left 

ventricular systolic function with ejection fraction 40-45%, mild MR and mild 

TR.








Review of Systems





At the time of my exam:


CONSTITUTIONAL: Denies fever. Denies chills.


EYES: Denies blurred vision. Denies vision changes. Denies eye pain.


EARS, NOSE, MOUTH & THROAT: Denies headache. Denies sore throat. Denies ear 

pain.


CARDIOVASCULAR: Complains of chest pain. Denies shortness of breath. Denies 

orthopnea. Denies PND. Denies palpitations.


RESPIRATORY: Denies cough. 


GASTROINTESTINAL: Denies abdominal pain. Denies diarrhea. Denies constipation. 

Denies nausea. Denies vomiting.


MUSCULOSKELETAL: Denies myalgias.


INTEGUMENTARY: Denies pruitis. Denies rash.


NEUROLOGIC: Denies numbness. Denies tingling. Denies weakness.


PSYCHIATRIC: Denies anxiety. Denies depression.


ENDOCRINE: Denies fatigue. Denies weight change. Denies polydipsia. Denies 

polyurina.


GENITOURINARY: Denies burning, hematuria or urgency with micturation.


HEMATOLOGIC: Denies history of anemia. Denies bleeding. 








Past Medical History


Past Medical History: Coronary Artery Disease (CAD), Cancer, Deep Vein 

Thrombosis (DVT), Hypertension, Osteoarthritis (OA)


Additional Past Medical History / Comment(s): mitral valve prolapse, legal 

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 

history of DVT, stent placed 2018


History of Any Multi-Drug Resistant Organisms: None Reported


Past Surgical History: Heart Catheterization With Stent, Hysterectomy


Additional Past Surgical History / Comment(s): eye surgery, endometrial and 

cervical cancer with radiation, breast biopsy


Past Anesthesia/Blood Transfusion Reactions: No Reported Reaction


Date of Last Stent Placement:: 


Smoking Status: Never smoker





- Past Family History


  ** Mother


Family Medical History: Cancer, Renal Disease


Additional Family Medical History / Comment(s): uterine CA





  ** Brother(s)


Family Medical History: Cancer


Additional Family Medical History / Comment(s): colon CA





  ** Sister(s)


Family Medical History: Cancer


Additional Family Medical History / Comment(s): colon and breast CA





  ** Father


Family Medical History: Myocardial Infarction (MI)


Additional Family Medical History / Comment(s):  at 54 from MI





Medications and Allergies


 Home Medications











 Medication  Instructions  Recorded  Confirmed  Type


 


Gabapentin [Neurontin] 300 mg PO HS 01/16/18 09/10/18 History


 


Brimonidine Tartrate [Alphagan P 1 drops BOTH EYES TID 04/13/18 09/10/18 History





0.2% Ophth Soln]    


 


Calcium Carbonate/Vitamin D3 1 tab PO BID 04/13/18 09/10/18 History





[Calcium 600-Vit D3 400 Caplet]    


 


Dorzolamide/Timolol/Pf [Cosopt Pf 1 applicator BOTH EYES TID 04/13/18 09/10/18 

History





2%/5% Ophth Droperette]    


 


Furosemide [Lasix] 20 mg PO DAILY PRN 04/13/18 09/10/18 History


 


Aspirin EC [Ecotrin Low Dose] 81 mg PO DAILY #30 tablet. 06/02/18 09/10/18 Rx


 


Atorvastatin [Lipitor] 40 mg PO DAILY #30 tab 06/02/18 09/10/18 Rx


 


Clopidogrel [Plavix] 75 mg PO DAILY #30 tab 06/02/18 09/10/18 Rx


 


Metoprolol Tartrate [Lopressor] 25 mg PO BID #60 tab 06/02/18 09/10/18 Rx


 


Nitroglycerin Sl Tabs [Nitrostat] 0.4 mg SUBLINGUAL Q5M PRN #25 tab 06/02/18 09/

10/18 Rx


 


Isosorbide Mononitrate ER [Imdur] 30 mg PO DAILY #30 tab.er.24h 07/07/18 09/10/

18 Rx


 


Docusate Sodium [Dok] 100 mg PO DAILY 09/09/18 09/10/18 History


 


Potassium Chloride ER [K-Dur 10] 10 meq PO DAILY PRN 09/09/18 09/10/18 History


 


amLODIPine [Norvasc] 2.5 mg PO DAILY 09/09/18 09/10/18 History











 Allergies











Allergy/AdvReac Type Severity Reaction Status Date / Time


 


ibuprofen [From Advil] AdvReac  Nausea & Verified 09/10/18 08:28





   Vomiting  














Physical Exam


Vitals: 


 Vital Signs











  Temp Pulse Pulse Resp BP BP Pulse Ox


 


 09/10/18 07:58  97.8 F   69  16   160/83  98


 


 09/10/18 04:00     16   


 


 09/10/18 03:57  98.5 F   68  16   151/79  96


 


 09/10/18 00:00  98.3 F   65  16   165/76  98


 


 18 23:08  98.5 F  72   18  199/52   99


 


 18 21:28   66   18  187/81   99


 


 18 21:05   74   18  199/86   99


 


 18 20:12   73   18  183/78   99


 


 18 19:57   73   18  206/116   98


 


 18 19:40     18   


 


 18 19:33  98.2 F  71   16  197/88   100








 Intake and Output











 09/09/18 09/10/18 09/10/18





 22:59 06:59 14:59


 


Other:   


 


  # Voids  1 


 


  Weight 55.792 kg 55.8 kg 











Blood pressure 151/79 heart rate 68 afebrile maintaining oxygen saturation on 

room air


GENERAL: This is a 78-year-old  female in no apparent distress at the 

time of my examination.


HEENT: Head is atraumatic, normocephalic. Pupils are equal, round. Sclerae 

anicteric. Conjunctivae are clear. Mucous membranes of the mouth are moist. 

Neck is supple. There is no jugular venous distention. No carotid bruit is 

heard.


LUNGS: Clear to auscultation no wheezes, rales or rhonchi. No chest wall 

tenderness is noted on palpation or with deep breathing.


HEART: Regular rate and rhythm with systolic murmur at the apex, no rubs or 

gallops. S1 and S2 heard.


ABDOMEN: Soft, nontender. Bowel sounds are heard. No organomegaly noted.


EXTREMITIES: No evidence of peripheral edema and no calf tenderness noted.


VASCULAR: Radial and dorsalis pedis pulses palpated, no evidence of clubbing.  


NEUROLOGIC: Patient is awake, alert and oriented x3.


 








Results





 09/10/18 05:44





 18 19:57


 Cardiac Enzymes











  09/09/18 09/09/18 09/10/18 Range/Units





  19:57 19:57 01:23 


 


AST   30   (14-36)  U/L


 


CK-MB (CK-2)  0.9   0.8  (0.0-2.4)  ng/mL


 


Troponin I  <0.012   <0.012  (0.000-0.034)  ng/mL








 Coagulation











  09/09/18 09/10/18 Range/Units





  19:57 05:41 


 


PT  9.9   (9.0-12.0)  sec


 


APTT  20.8 L  125.3 H*  (22.0-30.0)  sec








 Lipids











  09/10/18 Range/Units





  05:41 


 


Triglycerides  88  (<150)  mg/dL


 


Cholesterol  116  (<200)  mg/dL


 


HDL Cholesterol  48  (40-60)  mg/dL








 CBC











  09/09/18 09/10/18 Range/Units





  19:57 05:44 


 


WBC  5.1   (3.8-10.6)  k/uL


 


RBC  4.56   (3.80-5.40)  m/uL


 


Hgb  13.5   (11.4-16.0)  gm/dL


 


Hct  44.1   (34.0-46.0)  %


 


Plt Count  214  198  (150-450)  k/uL








 Comprehensive Metabolic Panel











  18 Range/Units





  19:57 


 


Sodium  144  (137-145)  mmol/L


 


Potassium  3.6  (3.5-5.1)  mmol/L


 


Chloride  106  ()  mmol/L


 


Carbon Dioxide  28  (22-30)  mmol/L


 


BUN  17  (7-17)  mg/dL


 


Creatinine  0.74  (0.52-1.04)  mg/dL


 


Glucose  113 H  (74-99)  mg/dL


 


Calcium  9.9  (8.4-10.2)  mg/dL


 


AST  30  (14-36)  U/L


 


ALT  31  (9-52)  U/L


 


Alkaline Phosphatase  100  ()  U/L


 


Total Protein  7.2  (6.3-8.2)  g/dL


 


Albumin  3.9  (3.5-5.0)  g/dL








 Current Medications











Generic Name Dose Route Start Last Admin





  Trade Name Freq  PRN Reason Stop Dose Admin


 


Aspirin  325 mg  09/10/18 09:00  





  Aspirin  PO   





  DAILY [facility]   





     





     





     





     


 


Heparin Sodium (Porcine)  0 unit  18 21:49  





  Heparin  IV   





  Q6HR PRN   





  Low PTT   





     





  Protocol   





     


 


Heparin Sodium/Sodium Chloride  500 mls @ 13.39 mls/hr  18 22:00   22:58





  25,000 unit/ Sodium Chloride  IV   12 units/kg/hr





  .Q24H MITRA   13.39 mls/hr





     Administration





     





  Protocol   





  12 UNITS/KG/HR   


 


Metoprolol Tartrate  50 mg  09/10/18 09:00  





  Lopressor  PO   





  BID [facility]   





     





     





     





     


 


Nitroglycerin  0.4 mg  18 21:49  





  Nitrostat  SUBLINGUAL   





  Q5M PRN   





  Chest Pain   





     





     





     








 Intake and Output











 09/09/18 09/10/18 09/10/18





 22:59 06:59 14:59


 


Other:   


 


  # Voids  1 


 


  Weight 55.792 kg 55.8 kg 








 





 09/10/18 05:44 





 18 19:57 











Assessment and Plan


Assessment: 





ASSESSMENT


Chest pain, atypical.  An acute coronary event has been ruled out with no EKG 

evidence of ischemia and negative cardiac enzymes.


Hypertensive emergency


History of coronary artery disease status post recent angioplasty


Dyslipidemia





PLAN


Obtain 2D echocardiogram and doppler study to assess cardiac structure and 

function. 


Resume lisinopril 10 mg daily. 


Perform Lexiscan stress test to assess for reversible ischemia. 


Continue amlodipine 2.5 mg daily, imdur 30 mg daily, lopressor 25 mg BID, 

plavix 75 mg daily and aspirin 81 mg daily. 


Further recommendations to follow based on clinical course and diagnostic test 

findings. 


Thank you kindly for this consultation. 





Nurse Practitioner note has been reviewed, I agree with a documented findings 

and plan of care.  Patient was seen and examined.

## 2018-09-10 NOTE — P.HPIM
History of Present Illness


H&P Date: 09/10/18


Chief Complaint: Chest pain





HISTORY AND PHYSICAL AND DISCHARGE SUMMARY: 





This is a 78-year-old  female patient of Dr. Reynoso with a 

previous medical history significant for hypertension and hypertensive cardio 

vascular disease with left ventricular hypertrophy, hyperlipidemia, history of 

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 

prolapse, also history of DVT.  Patient had a recent hospitalization beginning 

of  for accelerated hypertension was found to have elevated troponins with 

acute coronary syndrome underwent heart catheterization and stenting of the 

first diagonal branch with Dr. Tobin.  Patient states that she follows with 

Dr. Tobin and has had no other new medications but lisinopril was recently 

stopped by her PCP.  Patient complains of chest pain to the low sternal area 

that was nonradiating.  She states that similar to what she had with her heart 

attack but does not go through to her back.  She denies any radiation to the 

jaws or arms.  She states she was sitting when it was onset.  No shortness of 

breath no diarrhea.  





Patient came into [facility] emergency center for evaluation.  

Patient was found to have a blood pressure of 206/116.  She received labetalol 

20 mg IV push 2 and started initially on a heparin drip and placed in the 

observation unit.  EKG showed no acute ST changes.  Troponins have been 0.012 

x3 draws. White count was normal at 5.1, hemoglobin 13.5, electrolytes within 

normal limits, creatinine 0.74.  Blood sugar 113.  Chest x-ray shows chronic 

changes and cardiomegaly without acute pulmonary process.  Patient was placed 

in the observation unit, seen in consultation by cardiology and had a Lexiscan 

stress test which came back negative for reversible ischemia.  Echocardiogram 

reveals EF of 45-50% with mild concentric left ventricular hypertrophy, mild 

aortic regurgitation, moderate mitral regurgitation, mild tricuspid 

regurgitation, no evidence of pulmonary hypertension.





Review of Systems


All systems: negative


Constitutional: Denies chills, Denies fever, Denies poor appetite, Denies 

weight loss


Eyes: denies blurred vision, denies pain


Ears, nose, mouth and throat: Denies dysphagia, Denies headache, Denies sore 

throat, Denies vertigo


Cardiovascular: Reports chest pain, Denies dyspnea on exertion, Denies edema, 

Denies leg edema, Denies lightheadedness, Denies shortness of breath, Denies 

syncope


Respiratory: Denies cough, Denies cough with sputum, Denies dyspnea, Denies 

excessive sputum, Denies hemoptysis, Denies home oxygen, Denies wheezing


Gastrointestinal: Denies abdominal pain, Denies diarrhea, Denies nausea, Denies 

vomiting


Genitourinary: Denies dysuria, Denies hematuria


Musculoskeletal: Denies myalgias


Integumentary: Denies pruritus, Denies rash


Neurological: Denies numbness, Denies weakness


Psychiatric: Denies anxiety, Denies depression


Endocrine: Denies fatigue, Denies weight change





Past Medical History


Past Medical History: Coronary Artery Disease (CAD), Cancer, Deep Vein 

Thrombosis (DVT), Hypertension, Osteoarthritis (OA)


Additional Past Medical History / Comment(s): mitral valve prolapse, legal 

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 

history of DVT, stent placed 2018


History of Any Multi-Drug Resistant Organisms: None Reported


Past Surgical History: Heart Catheterization With Stent, Hysterectomy


Additional Past Surgical History / Comment(s): eye surgery, endometrial and 

cervical cancer with radiation, breast biopsy


Past Anesthesia/Blood Transfusion Reactions: No Reported Reaction


Date of Last Stent Placement:: 


Smoking Status: Never smoker





- Past Family History


  ** Mother


Family Medical History: Cancer, Renal Disease


Additional Family Medical History / Comment(s): uterine CA





  ** Brother(s)


Family Medical History: Cancer


Additional Family Medical History / Comment(s): colon CA





  ** Sister(s)


Family Medical History: Cancer


Additional Family Medical History / Comment(s): colon and breast CA





  ** Father


Family Medical History: Myocardial Infarction (MI)


Additional Family Medical History / Comment(s):  at 54 from MI





Medications and Allergies


 Home Medications











 Medication  Instructions  Recorded  Confirmed  Type


 


Gabapentin [Neurontin] 300 mg PO HS 01/16/18 09/10/18 History


 


Brimonidine Tartrate [Alphagan P 1 drops BOTH EYES TID 04/13/18 09/10/18 History





0.2% Ophth Soln]    


 


Calcium Carbonate/Vitamin D3 1 tab PO BID 04/13/18 09/10/18 History





[Calcium 600-Vit D3 400 Caplet]    


 


Dorzolamide/Timolol/Pf [Cosopt Pf 1 applicator BOTH EYES TID 04/13/18 09/10/18 

History





2%/5% Ophth Droperette]    


 


Furosemide [Lasix] 20 mg PO DAILY PRN 04/13/18 09/10/18 History


 


Aspirin EC [Ecotrin Low Dose] 81 mg PO DAILY #30 tablet.dr 06/02/18 09/10/18 Rx


 


Atorvastatin [Lipitor] 40 mg PO DAILY #30 tab 06/02/18 09/10/18 Rx


 


Clopidogrel [Plavix] 75 mg PO DAILY #30 tab 06/02/18 09/10/18 Rx


 


Metoprolol Tartrate [Lopressor] 25 mg PO BID #60 tab 06/02/18 09/10/18 Rx


 


Nitroglycerin Sl Tabs [Nitrostat] 0.4 mg SUBLINGUAL Q5M PRN #25 tab 06/02/18 09/

10/18 Rx


 


Isosorbide Mononitrate ER [Imdur] 30 mg PO DAILY #30 tab.er.24h 07/07/18 09/10/

18 Rx


 


Docusate Sodium [Dok] 100 mg PO DAILY 09/09/18 09/10/18 History


 


Potassium Chloride ER [K-Dur 10] 10 meq PO DAILY PRN 09/09/18 09/10/18 History


 


amLODIPine [Norvasc] 2.5 mg PO DAILY 09/09/18 09/10/18 History


 


Famotidine [Pepcid] 20 mg PO DAILY #30 tablet 09/10/18  Rx


 


Lisinopril [Zestril] 10 mg PO DAILY #30 tab 09/10/18  Rx











 Allergies











Allergy/AdvReac Type Severity Reaction Status Date / Time


 


ibuprofen [From Advil] AdvReac  Nausea & Verified 09/10/18 08:28





   Vomiting  














Physical Exam


Vitals: 


 Vital Signs











  Temp Pulse Pulse Resp BP BP Pulse Ox


 


 09/10/18 12:00  98.3 F   71  16   181/68  98


 


 09/10/18 09:08        97


 


 09/10/18 07:58  97.8 F   69  16   160/83  98


 


 09/10/18 04:00     16   


 


 09/10/18 03:57  98.5 F   68  16   151/79  96


 


 09/10/18 00:00  98.3 F   65  16   165/76  98


 


 18 23:08  98.5 F  72   18  199/52   99


 


 18 21:28   66   18  187/81   99


 


 18 21:05   74   18  199/86   99


 


 18 20:12   73   18  183/78   99


 


 18 19:57   73   18  206/116   98


 


 18 19:40     18   


 


 18 19:33  98.2 F  71   16  197/88   100








 Intake and Output











 09/09/18 09/10/18 09/10/18





 22:59 06:59 14:59


 


Intake Total   436


 


Balance   436


 


Intake:   


 


  Oral   436


 


Other:   


 


  # Voids  1 


 


  Weight 55.792 kg 55.8 kg 55.792 kg














General appearance: average body habitus, no acute distress





- EENT


Eyes: anicteric sclerae, EOMI, PERRLA, no ptosis, no scleral icterus, normal 

appearance


ENT: hearing grossly normal, NA/AT, normal oropharynx, no thrush


Ears: bilateral: normal





- Neck


Neck: no lymphadenopathy, normal ROM, no rigidity, no stridor, no thyromegaly


Carotids: bilateral: upstroke normal





- Respiratory


Respiratory: bilateral: diminished, negative: dullness, rales, rhonchi, wheezing

, prolonged expiration





- Cardiovascular


Rhythm: regular


Heart sounds: normal: S1, S2


Abnormal Heart Sounds: systolic murmur, no S3 Gallop





- Gastrointestinal


General gastrointestinal: normal bowel sounds, soft, no splenomegaly, no 

tenderness, no umbilical hernia, no ventral hernia





- Integumentary


Integumentary: normal, normal turgor





- Neurologic


Neurologic: CNII-XII intact





- Musculoskeletal


Musculoskeletal: strength equal bilaterally





- Psychiatric


Psychiatric: A&O x's 3, appropriate affect, intact judgment & insight








Results


CBC & Chem 7: 


 09/10/18 05:44





 18 19:57


Labs: 


 Abnormal Lab Results - Last 24 Hours (Table)











  18 Range/Units





  19:57 19:57 19:57 


 


MCHC  30.7 L    (31.0-37.0)  g/dL


 


APTT    20.8 L  (22.0-30.0)  sec


 


Glucose   113 H   (74-99)  mg/dL














  09/10/18 Range/Units





  05:41 


 


MCHC   (31.0-37.0)  g/dL


 


APTT  125.3 H*  (22.0-30.0)  sec


 


Glucose   (74-99)  mg/dL














Thrombosis Risk Factor Assmnt





- DVT/VTE Prophylaxis


DVT/VTE Prophylaxis: Pharmacologic Prophylaxis ordered





- Choose All That Apply


Each Risk Factor Represents 3 Points: Age 75 years or older


Thrombosis Risk Factor Assessment Total Risk Factor Score: 3


Thrombosis Risk Factor Assessment Level: Moderate Risk





Assessment and Plan


Plan: 





1.  Chest pain secondary to possible gastroesophageal reflux disease.  

Cardiology consult is appreciated lisinopril 10 mg daily added.  Continue 

Lopressor 25 mg twice daily.  Imdur 30 mg daily added.  





2.  Coronary artery disease status post recent stent in 2018.  Continue 

aspirin 81 mg daily, Lipitor 40 mg daily, Plavix 75 mg daily, Lopressor 25 mg 

twice daily.





3.  Hypertension presented with accelerated hypertension status post labetalol 

IV push 2 doses.  Continue patient on metoprolol 25 mg orally once every day.  

lisinopril added.





4.  History of Endometrial cancer back in  with radiation colitis.  Stable 

at this time.





5.  Hyperlipidemia.  Continue Lipitor 40 mg orally once every day.





6.  DVT prophylaxis.  Currently on heparin subcu.





7.  GI prophylaxis.  Continue Pepcid.





8.  Peripheral neuropathy.  Continue gabapentin 300 mg bedtime.





Patient placed in the observation unit.





Patient is full code.





Discharge plan: home





Impression and plan of care have been directed as dictated by the signing 

physician.  Yazmin Boogie nurse practitioner acting as scribe for signing 

physician.

## 2018-09-10 NOTE — ECHOF
Referral Reason:cp, hx recent stent and cardiomyopathy



MEASUREMENTS

--------

HEIGHT: 162.6 cm

WEIGHT: 55.8 kg

BP: 160/83

IVSd:   1.2 cm     (0.6 - 1.1)

LVIDd:   3.2 cm     (3.9 - 5.3)

LVPWd:   1.2 cm     (0.6 - 1.1)

IVSs:   1.4 cm

LVIDs:   2.0 cm

LVPWs:   1.5 cm

LAESV Index (A-L):   27.62 ml/m

Ao Diam:   2.9 cm     (2.0 - 3.7)

AV Cusp:   2.0 cm     (1.5 - 2.6)

LA Diam:   2.7 cm     (2.7 - 3.8)

MV E Jeffry:   0.82 m/s

MV DecT:   186 ms

MV A Jeffry:   1.00 m/s

MV E/A Ratio:   0.82 

AR PHT:   597 ms

RAP:   5.00 mmHg

RVSP:   12.33 mmHg







FINDINGS

--------

Sinus rhythm.

This was a technically good study.

The left ventricular size is normal.   There is mild concentric left ventricular hypertrophy.   Overa
ll left ventricular systolic function is mildly impaired with, an EF between 45 - 50 %.

The RV was not well visualized.

Normal LA  size by volume 22+/-6 ml/m2.

The right atrium is normal in size.

There is mild aortic valve sclerosis.   There is mild aortic regurgitation.   There is no evidence of
 aortic stenosis.

The mitral valve leaflets are mildly thickened.   Mild-to-moderate mitral regurgitation is present.

Mild tricuspid regurgitation present.   Right ventricular systolic pressure is normal at < 35 mmHg.  
 There is no evidence of pulmonary hypertension.

The pulmonic valve was not well visualized.

The aortic root size is normal.

Normal inferior vena cava with normal inspiratory collapse consistent with estimated right atrial pre
ssure of  5 mmHg.

There is a small pericardial effusion located near the left ventricle.



CONCLUSIONS

--------

1. Sinus rhythm.

2. This was a technically good study.

3. The left ventricular size is normal.

4. There is mild concentric left ventricular hypertrophy.

5. Overall left ventricular systolic function is mildly impaired with, an EF between 45 - 50 %.

6. The RV was not well visualized.

7. Normal LA size by volume 22+/-6 ml/m2.

8. There is mild aortic valve sclerosis.

9. There is mild aortic regurgitation.

10. The mitral valve leaflets are mildly thickened.

11. Mild-to-moderate mitral regurgitation is present.

12. Mild tricuspid regurgitation present.

13. Right ventricular systolic pressure is normal at < 35 mmHg.

14. There is no evidence of pulmonary hypertension.

15. The pulmonic valve was not well visualized.

16. The aortic root size is normal.

17. There is a small pericardial effusion located near the left ventricle.





SONOGRAPHER: Perry Higginbotham RDCS

## 2018-09-10 NOTE — NM
EXAMINATION TYPE: NM stress lexiscan cardiolite

 

DATE OF EXAM: 9/10/2018

 

COMPARISON: NONE

 

HISTORY: Chest pain

 

TECHNIQUE:  After the intravenous administration of 10.09 mCi Tc 99m Sestamibi - Cardiolite resting S
PECT images acquired 45 minutes post injection. 

 

The patient received 0.4mg Lexiscan, 24.8 mCi Tc 99m Sestamibi - Stress images obtained 30 minutes po
st injection 

 

FINDINGS:  

 

Review of stress and rest SPECT images demonstrates no distinct perfusion abnormality.  Gated analysi
s shows normal wall motion with an estimated left ventricular ejection fraction of 82 %.

 

 

 

IMPRESSION:  

 

No scintigraphic evidence for reversible ischemia. Suggest echocardiographic correlation for elevated
 ejection fraction

## 2018-09-10 NOTE — EST
EXERCISE STRESS



DATE OF SERVICE:

09/10/2018



AGE:

78



SEX:

Female



HT:

5'4"



WT:

123 pounds



PROTOCOL:

Lexiscan Cardiolite



STAGE:



DURATION OF EXERCISE:



HEART RATE REST:

72



BLOOD PRESSURE REST:

164/85



MAXIMUM HEART RATE ACHIEVED:

102



MAXIMUM BLOOD PRESSURE:

213/93



85% MPHR:



100% MPHR:



METS:



INDICATIONS:

Chest pain, hypertension.



CLINICAL INFORMATION:

Lexiscan nuclear study was performed. Peak blood pressure of 213/93 mmHg was noted.

Resting EKG shows normal sinus rhythm with normal IA interval and QRS duration and

normal ST-T waves.  No significant change in the ST-segment from the baseline was

noted.  The results of the nuclear study will follow.





MMODL / IJN: [id number] / Job#: [id number]

## 2019-03-21 ENCOUNTER — HOSPITAL ENCOUNTER (OUTPATIENT)
Dept: HOSPITAL 47 - LABWHC1 | Age: 79
Discharge: HOME | End: 2019-03-21
Attending: INTERNAL MEDICINE
Payer: MEDICARE

## 2019-03-21 DIAGNOSIS — E78.2: Primary | ICD-10-CM

## 2019-03-21 LAB
ALT SERPL-CCNC: 20 U/L (ref 8–44)
AST SERPL-CCNC: 28 U/L (ref 13–35)
CHOLEST SERPL-MCNC: 116 MG/DL (ref 0–200)
HDLC SERPL-MCNC: 44 MG/DL (ref 40–60)
LDLC SERPL CALC-MCNC: 55 MG/DL (ref 0–131)
TRIGL SERPL-MCNC: 85 MG/DL (ref 0–149)
VLDLC SERPL CALC-MCNC: 17 MG/DL (ref 5–40)

## 2019-03-21 PROCEDURE — 80061 LIPID PANEL: CPT

## 2019-03-21 PROCEDURE — 84460 ALANINE AMINO (ALT) (SGPT): CPT

## 2019-03-21 PROCEDURE — 36415 COLL VENOUS BLD VENIPUNCTURE: CPT

## 2019-03-21 PROCEDURE — 84450 TRANSFERASE (AST) (SGOT): CPT

## 2019-04-02 ENCOUNTER — HOSPITAL ENCOUNTER (INPATIENT)
Dept: HOSPITAL 47 - EC | Age: 79
LOS: 5 days | Discharge: HOME | DRG: 390 | End: 2019-04-07
Attending: INTERNAL MEDICINE | Admitting: INTERNAL MEDICINE
Payer: MEDICARE

## 2019-04-02 VITALS — BODY MASS INDEX: 19.8 KG/M2

## 2019-04-02 DIAGNOSIS — Z79.82: ICD-10-CM

## 2019-04-02 DIAGNOSIS — Z80.3: ICD-10-CM

## 2019-04-02 DIAGNOSIS — E78.5: ICD-10-CM

## 2019-04-02 DIAGNOSIS — I34.1: ICD-10-CM

## 2019-04-02 DIAGNOSIS — I25.10: ICD-10-CM

## 2019-04-02 DIAGNOSIS — I11.9: ICD-10-CM

## 2019-04-02 DIAGNOSIS — H54.8: ICD-10-CM

## 2019-04-02 DIAGNOSIS — H40.9: ICD-10-CM

## 2019-04-02 DIAGNOSIS — Z82.49: ICD-10-CM

## 2019-04-02 DIAGNOSIS — Z53.20: ICD-10-CM

## 2019-04-02 DIAGNOSIS — Z86.718: ICD-10-CM

## 2019-04-02 DIAGNOSIS — Z85.42: ICD-10-CM

## 2019-04-02 DIAGNOSIS — Z79.02: ICD-10-CM

## 2019-04-02 DIAGNOSIS — I16.0: ICD-10-CM

## 2019-04-02 DIAGNOSIS — Z80.0: ICD-10-CM

## 2019-04-02 DIAGNOSIS — I25.2: ICD-10-CM

## 2019-04-02 DIAGNOSIS — Z95.5: ICD-10-CM

## 2019-04-02 DIAGNOSIS — K21.9: ICD-10-CM

## 2019-04-02 DIAGNOSIS — Z80.49: ICD-10-CM

## 2019-04-02 DIAGNOSIS — G62.9: ICD-10-CM

## 2019-04-02 DIAGNOSIS — Z88.6: ICD-10-CM

## 2019-04-02 DIAGNOSIS — Z85.41: ICD-10-CM

## 2019-04-02 DIAGNOSIS — Z90.710: ICD-10-CM

## 2019-04-02 DIAGNOSIS — Z79.899: ICD-10-CM

## 2019-04-02 DIAGNOSIS — K56.600: Primary | ICD-10-CM

## 2019-04-02 LAB
ALBUMIN SERPL-MCNC: 3.7 G/DL (ref 3.5–5)
ALP SERPL-CCNC: 82 U/L (ref 38–126)
ALT SERPL-CCNC: 28 U/L (ref 9–52)
ANION GAP SERPL CALC-SCNC: 7 MMOL/L
APTT BLD: 16.6 SEC (ref 22–30)
AST SERPL-CCNC: 25 U/L (ref 14–36)
BASOPHILS # BLD AUTO: 0 K/UL (ref 0–0.2)
BASOPHILS NFR BLD AUTO: 0 %
BUN SERPL-SCNC: 15 MG/DL (ref 7–17)
CALCIUM SPEC-MCNC: 9.7 MG/DL (ref 8.4–10.2)
CHLORIDE SERPL-SCNC: 108 MMOL/L (ref 98–107)
CO2 SERPL-SCNC: 27 MMOL/L (ref 22–30)
EOSINOPHIL # BLD AUTO: 0.1 K/UL (ref 0–0.7)
EOSINOPHIL NFR BLD AUTO: 1 %
ERYTHROCYTE [DISTWIDTH] IN BLOOD BY AUTOMATED COUNT: 4.25 M/UL (ref 3.8–5.4)
ERYTHROCYTE [DISTWIDTH] IN BLOOD: 14.6 % (ref 11.5–15.5)
GLUCOSE SERPL-MCNC: 99 MG/DL (ref 74–99)
HCT VFR BLD AUTO: 40.9 % (ref 34–46)
HGB BLD-MCNC: 12.8 GM/DL (ref 11.4–16)
INR PPP: 0.9 (ref ?–1.2)
LYMPHOCYTES # SPEC AUTO: 1 K/UL (ref 1–4.8)
LYMPHOCYTES NFR SPEC AUTO: 13 %
MAGNESIUM SPEC-SCNC: 2 MG/DL (ref 1.6–2.3)
MCH RBC QN AUTO: 30.1 PG (ref 25–35)
MCHC RBC AUTO-ENTMCNC: 31.3 G/DL (ref 31–37)
MCV RBC AUTO: 96.4 FL (ref 80–100)
MONOCYTES # BLD AUTO: 0.3 K/UL (ref 0–1)
MONOCYTES NFR BLD AUTO: 5 %
NEUTROPHILS # BLD AUTO: 5.8 K/UL (ref 1.3–7.7)
NEUTROPHILS NFR BLD AUTO: 80 %
PLATELET # BLD AUTO: 235 K/UL (ref 150–450)
POTASSIUM SERPL-SCNC: 4 MMOL/L (ref 3.5–5.1)
PROT SERPL-MCNC: 6.5 G/DL (ref 6.3–8.2)
PT BLD: 9.6 SEC (ref 9–12)
SODIUM SERPL-SCNC: 142 MMOL/L (ref 137–145)
WBC # BLD AUTO: 7.3 K/UL (ref 3.8–10.6)

## 2019-04-02 PROCEDURE — 85610 PROTHROMBIN TIME: CPT

## 2019-04-02 PROCEDURE — 74019 RADEX ABDOMEN 2 VIEWS: CPT

## 2019-04-02 PROCEDURE — 96361 HYDRATE IV INFUSION ADD-ON: CPT

## 2019-04-02 PROCEDURE — 80048 BASIC METABOLIC PNL TOTAL CA: CPT

## 2019-04-02 PROCEDURE — 36415 COLL VENOUS BLD VENIPUNCTURE: CPT

## 2019-04-02 PROCEDURE — 96375 TX/PRO/DX INJ NEW DRUG ADDON: CPT

## 2019-04-02 PROCEDURE — 84484 ASSAY OF TROPONIN QUANT: CPT

## 2019-04-02 PROCEDURE — 85025 COMPLETE CBC W/AUTO DIFF WBC: CPT

## 2019-04-02 PROCEDURE — 74018 RADEX ABDOMEN 1 VIEW: CPT

## 2019-04-02 PROCEDURE — 93005 ELECTROCARDIOGRAM TRACING: CPT

## 2019-04-02 PROCEDURE — 74176 CT ABD & PELVIS W/O CONTRAST: CPT

## 2019-04-02 PROCEDURE — 71046 X-RAY EXAM CHEST 2 VIEWS: CPT

## 2019-04-02 PROCEDURE — 85730 THROMBOPLASTIN TIME PARTIAL: CPT

## 2019-04-02 PROCEDURE — 99285 EMERGENCY DEPT VISIT HI MDM: CPT

## 2019-04-02 PROCEDURE — 83735 ASSAY OF MAGNESIUM: CPT

## 2019-04-02 PROCEDURE — 96374 THER/PROPH/DIAG INJ IV PUSH: CPT

## 2019-04-02 PROCEDURE — 80053 COMPREHEN METABOLIC PANEL: CPT

## 2019-04-02 NOTE — XR
EXAMINATION TYPE: XR KUB

 

DATE OF EXAM: 4/2/2019

 

COMPARISON: 1/16/2018

 

HISTORY: Unresponsive. Altered mental status. Pain.

 

TECHNIQUE: Single view

 

FINDINGS: Supine view of the abdomen shows no sign of intestinal obstruction or pneumoperitoneum. Fec
al pattern is normal. There is lumbar levoscoliosis. There are numerous surgical clips in the pelvis.
 There are no pathologic calcifications over the kidneys.

 

IMPRESSION: Nonacute abdomen.

## 2019-04-02 NOTE — ED
General Adult HPI





- General


Chief complaint: Syncope


Stated complaint: Syncope


Time Seen by Provider: 19 16:15


Source: patient, EMS, RN notes reviewed


Mode of arrival: EMS


Limitations: no limitations





- History of Present Illness


Initial comments: 





This is a 79-year-old female who presents to the emergency department after 

having had a syncopal episode.  Patient states she's had multiple bowel 

obstructions in the past.  Patient states today she started having some lower 

abdominal pain and cramping.  Patient states she went to the bathroom to have a 

bowel movement and she did have a bowel movement but shortly thereafter she 

became lightheaded and passed out.   states she never went to the ground 

because he could not get her off the toilet.  Patient woke up once and then 

passed out again.  Patient states she never had any chest pain difficulty 

breathing or shortness of breath.  The pain is very minimal in the lower abdomen

at this time.  Patient states this is happened multiple times in the past when 

she's had bowel movements with cramping.  Patient denies any recent fever chills

or cough.  Patient denies any diarrhea.  Patient denies any nausea vomiting.  

Patient denies any headache patient denies numbness or weakness.  





- Related Data


                                Home Medications











 Medication  Instructions  Recorded  Confirmed


 


Gabapentin [Neurontin] 300 mg PO HS 18


 


Brimonidine Tartrate [Alphagan P 1 drops BOTH EYES TID 18





0.2% Ophth Soln]   


 


Dorzolamide/Timolol/Pf [Cosopt Pf 1 applicator BOTH EYES TID 18





2%/5% Ophth Droperette]   


 


Furosemide [Lasix] 20 mg PO DAILY PRN 18


 


Docusate Sodium [Dok] 100 mg PO HS 18


 


Potassium Chloride ER [K-Dur 10] 10 meq PO DAILY PRN 18


 


Calcium   1,000 mg PO DAILY 19 


 


Famotidine [Pepcid] 20 mg PO DAILY PRN 19


 


Isosorbide Mononitrate ER [Imdur] 30 mg PO DAILY 19


 


Lisinopril [Zestril] 2.5 mg PO 1300 19








                                  Previous Rx's











 Medication  Instructions  Recorded


 


Aspirin EC [Ecotrin Low Dose] 81 mg PO DAILY #30 tablet. 18


 


Atorvastatin [Lipitor] 40 mg PO DAILY #30 tab 18


 


Clopidogrel [Plavix] 75 mg PO DAILY #30 tab 18


 


Metoprolol Tartrate [Lopressor] 25 mg PO BID #60 tab 18


 


Nitroglycerin Sl Tabs [Nitrostat] 0.4 mg SUBLINGUAL Q5M PRN #25 tab 18











                                    Allergies











Allergy/AdvReac Type Severity Reaction Status Date / Time


 


ibuprofen [From Advil] AdvReac  Nausea & Verified 19 13:34





   Vomiting  














Review of Systems


ROS Statement: 


Those systems with pertinent positive or pertinent negative responses have been 

documented in the HPI.





ROS Other: All systems not noted in ROS Statement are negative.





Past Medical History


Past Medical History: Coronary Artery Disease (CAD), Cancer, Deep Vein 

Thrombosis (DVT), Eye Disorder, GERD/Reflux, Hyperlipidemia, Hypertension, 

Myocardial Infarction (MI), Osteoarthritis (OA)


Additional Past Medical History / Comment(s): Bowel obstruction 2018 while 

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 

catherine legs, uterine cancer in , endometrial and cervical ca with radiation,


Last Myocardial Infarction Date:: 18


History of Any Multi-Drug Resistant Organisms: None Reported


Past Surgical History: Heart Catheterization With Stent, Hysterectomy


Additional Past Surgical History / Comment(s): eye surgery, breast biopsy


Past Anesthesia/Blood Transfusion Reactions: No Reported Reaction


Date of Last Stent Placement:: 


Past Psychological History: No Psychological Hx Reported


Smoking Status: Never smoker


Past Alcohol Use History: None Reported


Past Drug Use History: None Reported





- Past Family History


  ** Mother


Family Medical History: Cancer, Renal Disease


Additional Family Medical History / Comment(s): uterine CA





  ** Brother(s)


Family Medical History: Cancer


Additional Family Medical History / Comment(s): colon CA





  ** Sister(s)


Family Medical History: Cancer


Additional Family Medical History / Comment(s): colon and breast CA





  ** Father


Family Medical History: Myocardial Infarction (MI)


Additional Family Medical History / Comment(s):  at 54 from MI





General Exam





- General Exam Comments


Initial Comments: 





GENERAL:


Patient is well-developed and well-nourished.  Patient is nontoxic and well-

hydrated and is in mild distress.





ENT:


Neck is soft and supple.  No significant lymphadenopathy is noted.  Oropharynx 

is clear.  Moist mucous membranes.  Neck has full range of motion without 

eliciting any pain.  





EYES:


The sclera were anicteric and conjunctiva were pink and moist.  Extraocular 

movements were intact and pupils were equal round and reactive to light.  

Eyelids were unremarkable.





PULMONARY:


Unlabored respirations.  Good breath sounds bilaterally.  No audible rales 

rhonchi or wheezing was noted.





CARDIOVASCULAR:


There is a regular rate and rhythm without any murmurs gallops or rubs. 





ABDOMEN:


Soft and nontender with normal bowel sounds.  No palpable organomegaly was 

noted.  There is no palpable pulsatile mass.





SKIN:


Skin is clear with no lesions or rashes and otherwise unremarkable.





NEUROLOGIC:


Patient is alert and oriented x3.  Cranial nerves II through XII are grossly 

intact.  Motor and sensory are also intact.  Normal speech, volume and content. 

Symmetrical smile. 





MUSCULOSKELETAL:


Normal extremities with adequate strength and full range of motion.  No lower 

extremity swelling or edema.  No calf tenderness.





LYMPHATICS:


No significant lymphadenopathy is noted





PSYCHIATRIC:


Normal psychiatric evaluation. 


Limitations: no limitations





Course


                                   Vital Signs











  19





  16:16 17:30 18:45


 


Temperature 97.8 F 98.0 F 


 


Pulse Rate 56 L 59 L 


 


Pulse Rate [   68





Sitting]   


 


Pulse Rate [   64





Standing]   


 


Pulse Rate [   60





Supine]   


 


Respiratory 16 16 20





Rate   


 


Blood Pressure 154/67 154/67 


 


Blood Pressure   175/72





[Sitting]   


 


Blood Pressure   181/80





[Standing]   


 


Blood Pressure   181/74





[Supine]   


 


O2 Sat by Pulse 97 100 





Oximetry   














  19





  20:09


 


Temperature 


 


Pulse Rate 80


 


Pulse Rate [ 





Sitting] 


 


Pulse Rate [ 





Standing] 


 


Pulse Rate [ 





Supine] 


 


Respiratory 20





Rate 


 


Blood Pressure 190/88


 


Blood Pressure 





[Sitting] 


 


Blood Pressure 





[Standing] 


 


Blood Pressure 





[Supine] 


 


O2 Sat by Pulse 98





Oximetry 














Medical Decision Making





- Medical Decision Making





EKG shows sinus bradycardia 54 bpm CA interval 126 QRS is 90 QT interval 444 QTC

 is 421.  Patient's EKG shows no ST segment elevation or depression or T wave 

abnormalities are noted.





Patient started vomiting profusely and started feeling lightheaded.


EKG was done.





EKG showed normal sinus rhythm at 71 bpm CA interval 140 QRS is 82 QT interval 

400 QTC is 434 per patient's EKG shows no ST segment elevation or depression or 

T wave abnormalities are noted.





Patient was still unsteady anytime she stood up and felt as though she may pass 

out.  No changes were seen on the monitor and no changes were noted her blood 

pressure.





Patient will be admitted I spoke with Dr. Hwang he is in agreement.





I went in and reevaluate the patient she had no nausea at this time and her 

abdomen was nontender.  Patient refused an NG at this time.





- Lab Data


Result diagrams: 


                                 19 19:20





                                 19 19:20


                                   Lab Results











  19 Range/Units





  19:20 19:20 19:20 


 


WBC  7.3    (3.8-10.6)  k/uL


 


RBC  4.25    (3.80-5.40)  m/uL


 


Hgb  12.8    (11.4-16.0)  gm/dL


 


Hct  40.9    (34.0-46.0)  %


 


MCV  96.4    (80.0-100.0)  fL


 


MCH  30.1    (25.0-35.0)  pg


 


MCHC  31.3    (31.0-37.0)  g/dL


 


RDW  14.6    (11.5-15.5)  %


 


Plt Count  235    (150-450)  k/uL


 


Neutrophils %  80    %


 


Lymphocytes %  13    %


 


Monocytes %  5    %


 


Eosinophils %  1    %


 


Basophils %  0    %


 


Neutrophils #  5.8    (1.3-7.7)  k/uL


 


Lymphocytes #  1.0    (1.0-4.8)  k/uL


 


Monocytes #  0.3    (0-1.0)  k/uL


 


Eosinophils #  0.1    (0-0.7)  k/uL


 


Basophils #  0.0    (0-0.2)  k/uL


 


Hypochromasia  Slight    


 


PT    9.6  (9.0-12.0)  sec


 


INR    0.9  (<1.2)  


 


APTT    16.6 L  (22.0-30.0)  sec


 


Sodium   142   (137-145)  mmol/L


 


Potassium   4.0   (3.5-5.1)  mmol/L


 


Chloride   108 H   ()  mmol/L


 


Carbon Dioxide   27   (22-30)  mmol/L


 


Anion Gap   7   mmol/L


 


BUN   15   (7-17)  mg/dL


 


Creatinine   0.58   (0.52-1.04)  mg/dL


 


Est GFR (CKD-EPI)AfAm   >90   (>60 ml/min/1.73 sqM)  


 


Est GFR (CKD-EPI)NonAf   89   (>60 ml/min/1.73 sqM)  


 


Glucose   99   (74-99)  mg/dL


 


Calcium   9.7   (8.4-10.2)  mg/dL


 


Magnesium   2.0   (1.6-2.3)  mg/dL


 


Total Bilirubin   0.8   (0.2-1.3)  mg/dL


 


AST   25   (14-36)  U/L


 


ALT   28   (9-52)  U/L


 


Alkaline Phosphatase   82   ()  U/L


 


Troponin I     (0.000-0.034)  ng/mL


 


Total Protein   6.5   (6.3-8.2)  g/dL


 


Albumin   3.7   (3.5-5.0)  g/dL














  19 Range/Units





  19:20 


 


WBC   (3.8-10.6)  k/uL


 


RBC   (3.80-5.40)  m/uL


 


Hgb   (11.4-16.0)  gm/dL


 


Hct   (34.0-46.0)  %


 


MCV   (80.0-100.0)  fL


 


MCH   (25.0-35.0)  pg


 


MCHC   (31.0-37.0)  g/dL


 


RDW   (11.5-15.5)  %


 


Plt Count   (150-450)  k/uL


 


Neutrophils %   %


 


Lymphocytes %   %


 


Monocytes %   %


 


Eosinophils %   %


 


Basophils %   %


 


Neutrophils #   (1.3-7.7)  k/uL


 


Lymphocytes #   (1.0-4.8)  k/uL


 


Monocytes #   (0-1.0)  k/uL


 


Eosinophils #   (0-0.7)  k/uL


 


Basophils #   (0-0.2)  k/uL


 


Hypochromasia   


 


PT   (9.0-12.0)  sec


 


INR   (<1.2)  


 


APTT   (22.0-30.0)  sec


 


Sodium   (137-145)  mmol/L


 


Potassium   (3.5-5.1)  mmol/L


 


Chloride   ()  mmol/L


 


Carbon Dioxide   (22-30)  mmol/L


 


Anion Gap   mmol/L


 


BUN   (7-17)  mg/dL


 


Creatinine   (0.52-1.04)  mg/dL


 


Est GFR (CKD-EPI)AfAm   (>60 ml/min/1.73 sqM)  


 


Est GFR (CKD-EPI)NonAf   (>60 ml/min/1.73 sqM)  


 


Glucose   (74-99)  mg/dL


 


Calcium   (8.4-10.2)  mg/dL


 


Magnesium   (1.6-2.3)  mg/dL


 


Total Bilirubin   (0.2-1.3)  mg/dL


 


AST   (14-36)  U/L


 


ALT   (9-52)  U/L


 


Alkaline Phosphatase   ()  U/L


 


Troponin I  <0.012  (0.000-0.034)  ng/mL


 


Total Protein   (6.3-8.2)  g/dL


 


Albumin   (3.5-5.0)  g/dL














Disposition


Clinical Impression: 


 Syncope and collapse, Vomiting





Disposition: ADMITTED AS IP TO THIS HOSP


Referrals: 


Ashwin Reynoso DO [Primary Care Provider] - 1-2 days


Time of Disposition: 20:52

## 2019-04-02 NOTE — XR
EXAMINATION TYPE: XR chest 2V

 

DATE OF EXAM: 4/2/2019

 

COMPARISON: 9/9/2018

 

HISTORY: Unresponsive

 

TECHNIQUE:  Frontal and lateral views of the chest are obtained.

 

FINDINGS:  Heart and mediastinum are within normal limits. Lungs are clear. There is no heart failure
. There is no pleural effusion. Bony thorax is intact. There are no hilar masses. IMPRESSION:  No act
richard cardiopulmonary disease. No change.

## 2019-04-02 NOTE — CT
EXAMINATION TYPE: CT abdomen pelvis wo con

 

DATE OF EXAM: 4/2/2019

 

COMPARISON: 4/14/2018

 

HISTORY: Abdomen pain, syncope

 

CT DLP: 317.2 mGycm

Automated exposure control for dose reduction was used.

 

TECHNIQUE:  Helical acquisition of images was performed from the lung bases through the pelvis.

 

FINDINGS: 

Lung bases are clear. There is no pleural effusion. Heart appears slightly enlarged. There is no yolanda
cardial effusion.

 

There is small hiatal hernia. The remainder of the stomach appears normal. Liver spleen pancreas appe
ar normal. Bile ducts are not dilated. Gallbladder appears normal.

 

There is no adrenal mass. Kidneys have normal size. There is no hydronephrosis. There is 7 mm calculu
s lower pole right kidney. Ureters are not dilated. There is no retroperitoneal adenopathy. Bladder d
istends smoothly. There is no interval hernia. There are numerous surgical clips in the pelvis on the
 right side. There are multiple distended fluid-filled loops of small bowel. Transition point is prob
ably in the right lower quadrant. Appendix not seen. No sign of appendicitis.

 

There is no free air. There is no ascites. There is no inguinal hernia. There is no mesenteric edema.
 Abdominal aorta is atheromatous. I see no bony destructive process. There is no lumbar compression f
racture. There is lumbar levorotoscoliosis.

 

IMPRESSION: 

DISTENDED FLUID-FILLED LOOPS OF SMALL BOWEL IS SUGGESTIVE OF PARTIAL MECHANICAL OBSTRUCTION OR SMALL 
BOWEL ILEUS. THIS IS A CHANGE COMPARED TO OLD CT SCAN. THERE IS CLEARING OF THE LEFT PLEURAL EFFUSION
 COMPARED TO OLD EXAM. NONOBSTRUCTING RIGHT RENAL CALCULUS UNCHANGED.

## 2019-04-03 LAB
ANION GAP SERPL CALC-SCNC: 5 MMOL/L
BASOPHILS # BLD AUTO: 0 K/UL (ref 0–0.2)
BASOPHILS NFR BLD AUTO: 0 %
BUN SERPL-SCNC: 13 MG/DL (ref 7–17)
CALCIUM SPEC-MCNC: 8.3 MG/DL (ref 8.4–10.2)
CHLORIDE SERPL-SCNC: 112 MMOL/L (ref 98–107)
CO2 SERPL-SCNC: 27 MMOL/L (ref 22–30)
EOSINOPHIL # BLD AUTO: 0 K/UL (ref 0–0.7)
EOSINOPHIL NFR BLD AUTO: 0 %
ERYTHROCYTE [DISTWIDTH] IN BLOOD BY AUTOMATED COUNT: 3.67 M/UL (ref 3.8–5.4)
ERYTHROCYTE [DISTWIDTH] IN BLOOD: 14.9 % (ref 11.5–15.5)
GLUCOSE SERPL-MCNC: 97 MG/DL (ref 74–99)
HCT VFR BLD AUTO: 35 % (ref 34–46)
HGB BLD-MCNC: 11 GM/DL (ref 11.4–16)
LYMPHOCYTES # SPEC AUTO: 0.8 K/UL (ref 1–4.8)
LYMPHOCYTES NFR SPEC AUTO: 11 %
MCH RBC QN AUTO: 30.1 PG (ref 25–35)
MCHC RBC AUTO-ENTMCNC: 31.5 G/DL (ref 31–37)
MCV RBC AUTO: 95.3 FL (ref 80–100)
MONOCYTES # BLD AUTO: 0.4 K/UL (ref 0–1)
MONOCYTES NFR BLD AUTO: 6 %
NEUTROPHILS # BLD AUTO: 5.4 K/UL (ref 1.3–7.7)
NEUTROPHILS NFR BLD AUTO: 81 %
PLATELET # BLD AUTO: 194 K/UL (ref 150–450)
POTASSIUM SERPL-SCNC: 4.2 MMOL/L (ref 3.5–5.1)
SODIUM SERPL-SCNC: 144 MMOL/L (ref 137–145)
WBC # BLD AUTO: 6.7 K/UL (ref 3.8–10.6)

## 2019-04-03 RX ADMIN — BRIMONIDINE TARTRATE SCH: 2 SOLUTION/ DROPS OPHTHALMIC at 16:13

## 2019-04-03 RX ADMIN — DORZOLAMIDE HYDROCHLORIDE AND TIMOLOL MALEATE SCH: 20; 5 SOLUTION/ DROPS OPHTHALMIC at 16:13

## 2019-04-03 RX ADMIN — DORZOLAMIDE HYDROCHLORIDE AND TIMOLOL MALEATE SCH DROPS: 20; 5 SOLUTION/ DROPS OPHTHALMIC at 21:21

## 2019-04-03 RX ADMIN — DORZOLAMIDE HYDROCHLORIDE AND TIMOLOL MALEATE SCH DROPS: 20; 5 SOLUTION/ DROPS OPHTHALMIC at 16:35

## 2019-04-03 RX ADMIN — BRIMONIDINE TARTRATE SCH DROPS: 2 SOLUTION/ DROPS OPHTHALMIC at 16:35

## 2019-04-03 RX ADMIN — BRIMONIDINE TARTRATE SCH DROPS: 2 SOLUTION/ DROPS OPHTHALMIC at 22:49

## 2019-04-03 RX ADMIN — HYDRALAZINE HYDROCHLORIDE PRN MG: 20 INJECTION INTRAMUSCULAR; INTRAVENOUS at 16:29

## 2019-04-03 RX ADMIN — DEXTROSE MONOHYDRATE, SODIUM CHLORIDE, AND POTASSIUM CHLORIDE SCH MLS/HR: 50; 4.5; 1.49 INJECTION, SOLUTION INTRAVENOUS at 19:41

## 2019-04-03 RX ADMIN — GABAPENTIN SCH MG: 300 CAPSULE ORAL at 21:20

## 2019-04-03 RX ADMIN — HYDRALAZINE HYDROCHLORIDE PRN MG: 20 INJECTION INTRAMUSCULAR; INTRAVENOUS at 00:07

## 2019-04-03 RX ADMIN — HEPARIN SODIUM SCH UNIT: 5000 INJECTION, SOLUTION INTRAVENOUS; SUBCUTANEOUS at 21:22

## 2019-04-03 RX ADMIN — METOPROLOL TARTRATE SCH MG: 25 TABLET, FILM COATED ORAL at 21:20

## 2019-04-03 NOTE — XR
EXAMINATION TYPE: XR abdomen 2V

 

DATE OF EXAM: 4/3/2019

 

COMPARISON: Yesterday

 

HISTORY: Obstruction

 

TECHNIQUE: 2 views supine

 

FINDINGS: There is no sign of intestinal obstruction or pneumoperitoneum. Fecal pattern is normal. Th
ere are surgical clips in the pelvis. There is lumbar levoscoliosis. Lung bases are clear.

 

IMPRESSION: Nonacute abdomen. No change compared to yesterday.

## 2019-04-03 NOTE — P.GSCN
History of Present Illness


Consult date: 19


Reason for Consult: 





small bowel obstruction





Requesting physician: Ray Trinidad


History of present illness: 





CHIEF COMPLAINT: abdominal pain





HISTORY OF PRESENT ILLNESS: 79-year-old  female who was admitted to the

hospital secondary to syncopal episode home.  General surgery was consulted for 

further evaluation of abdominal pain. Patient reports she began having abdominal

pain yesterday around 1300. She reports right and left lower quadrant abdominal 

pain. She reports an episode of emesis in the emergency room and one overnight. 

Patient refused NG tube. She reports passing flatus this morning and having a 

small bowel movement. Her abdominal pain has improved. She reports having a 

bowel obstruction in February of this year while down in [place]. She states she

had an NG tube for 3 days and the obstruction resolved. She also reports having 

additional SBO in the past that were all treated conservatively. Patient 

underwent EGD on 2019 with Dr. Apodaca revealing mild gastritis and small 

hiatal hernia.





PAST MEDICAL HISTORY: 


See list.





PAST SURGICAL HISTORY: 


See list.





SOCIAL HISTORY: No illicit drug use.  





REVIEW OF SYSTEMS: 


CONSTITUTIONAL: Denies fever or chills.


HEENT: Denies blurred vision, vision changes, or eye pain. Denies hemoptysis 


CARDIOVASCULAR: Denies chest pain or pressure.


RESPIRATORY: No shortness of breath. 


GASTROINTESTINAL: Refer to HPI for pertinent findings


HEMATOLOGIC: Denies bleeding disorders.


GENITOURINARY:  Denies any blood in urine.


SKIN: Denies pruitis. Denies rash.





PHYSICAL EXAM: 


VITAL SIGNS: Reviewed.


GENERAL: Well-developed in no acute distress. 


HEENT:  No sclera icterus. Extraocular movements grossly intact.  Moist buccal 

mucosa. Head is atraumatic, normocephalic. 


ABDOMEN:  Soft.  Nondistended. Nontender. Very hypoactive bowel sounds.


NEUROLOGIC: Alert and oriented. Cranial nerves II through XII grossly intact.





IMAGING:


CT abdomen and pelvis: Distended fluid-filled small bowel suggestive of partial 

mechanical obstruction or small bowel ileus.





ASSESSMENT: 


1.  Abdominal pain, nausea, vomiting x 1 day


2.  Partial small bowel obstruction


3.  History of multiple small bowel obstructions, most recent 2019, all

resolved with conservative management


4.  History of EGD on 2019 revealing mild gastritis and small hiatal 

hernia





PLAN: 


NPO. Bowel rest. Continue IV fluids.





Nurse practitioner note has been reviewed by physician. Signing provider agrees 

with the documented findings, assessment, and plan of care. 








Past Medical History


Past Medical History: Coronary Artery Disease (CAD), Cancer, Deep Vein 

Thrombosis (DVT), Eye Disorder, GERD/Reflux, Hyperlipidemia, Hypertension, 

Myocardial Infarction (MI), Osteoarthritis (OA)


Additional Past Medical History / Comment(s): Bowel obstruction 2018 while 

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 

catherine legs, uterine cancer in , endometrial and cervical ca with radiation,


Last Myocardial Infarction Date:: 18


History of Any Multi-Drug Resistant Organisms: None Reported


Past Surgical History: Heart Catheterization With Stent, Hysterectomy


Additional Past Surgical History / Comment(s): eye surgery, breast biopsy


Past Anesthesia/Blood Transfusion Reactions: No Reported Reaction


Date of Last Stent Placement:: 


Smoking Status: Never smoker





- Past Family History


  ** Mother


Family Medical History: Cancer, Renal Disease


Additional Family Medical History / Comment(s): uterine CA





  ** Brother(s)


Family Medical History: Cancer


Additional Family Medical History / Comment(s): colon CA





  ** Sister(s)


Family Medical History: Cancer


Additional Family Medical History / Comment(s): colon and breast CA





  ** Father


Family Medical History: Myocardial Infarction (MI)


Additional Family Medical History / Comment(s):  at 54 from MI





Medications and Allergies


                                Home Medications











 Medication  Instructions  Recorded  Confirmed  Type


 


Gabapentin [Neurontin] 300 mg PO HS 18 History


 


Brimonidine Tartrate [Alphagan P 1 drops BOTH EYES TID 18 History





0.2% Ophth Soln]    


 


Dorzolamide/Timolol/Pf [Cosopt Pf 1 applicator BOTH EYES TID 18 

History





2%/5% Ophth Droperette]    


 


Furosemide [Lasix] 20 mg PO DAILY PRN 18 History


 


Aspirin EC [Ecotrin Low Dose] 81 mg PO DAILY #30 tablet. 18 Rx


 


Atorvastatin [Lipitor] 40 mg PO DAILY #30 tab 18 Rx


 


Clopidogrel [Plavix] 75 mg PO DAILY #30 tab 18 Rx


 


Metoprolol Tartrate [Lopressor] 25 mg PO BID #60 tab 18 Rx


 


Nitroglycerin Sl Tabs [Nitrostat] 0.4 mg SUBLINGUAL Q5M PRN #25 tab 18 Rx


 


Docusate Sodium [Dok] 100 mg PO HS 18 History


 


Calcium   1,000 mg PO DAILY 19 History


 


Famotidine [Pepcid] 20 mg PO DAILY PRN 19 History


 


Isosorbide Mononitrate ER [Imdur] 30 mg PO DAILY 19 History


 


Lisinopril [Zestril] 2.5 mg PO DAILY@1300 19 History


 


Potassium Chloride [Klor-Con 20] 20 meq PO DAILY 19 History








                                    Allergies











Allergy/AdvReac Type Severity Reaction Status Date / Time


 


ibuprofen [From Advil] AdvReac  Nausea & Verified 19 10:07





   Vomiting  














Surgical - Exam


                                   Vital Signs











Temp Pulse Resp BP Pulse Ox


 


 97.8 F   56 L  16   154/67   97 


 


 19 16:16  19 16:16  19 16:16  19 16:16  19 16:16














Results





- Labs





                                 19 06:46





                                 19 06:46


                  Abnormal Lab Results - Last 24 Hours (Table)











  19 Range/Units





  19:20 19:20 06:46 


 


RBC    3.67 L  (3.80-5.40)  m/uL


 


Hgb    11.0 L  (11.4-16.0)  gm/dL


 


Lymphocytes #    0.8 L  (1.0-4.8)  k/uL


 


APTT   16.6 L   (22.0-30.0)  sec


 


Chloride  108 H    ()  mmol/L


 


Calcium     (8.4-10.2)  mg/dL














  19 Range/Units





  06:46 


 


RBC   (3.80-5.40)  m/uL


 


Hgb   (11.4-16.0)  gm/dL


 


Lymphocytes #   (1.0-4.8)  k/uL


 


APTT   (22.0-30.0)  sec


 


Chloride  112 H  ()  mmol/L


 


Calcium  8.3 L  (8.4-10.2)  mg/dL








                                 Diabetes panel











  19 Range/Units





  19:20 06:46 


 


Sodium  142  144  (137-145)  mmol/L


 


Potassium  4.0  4.2  (3.5-5.1)  mmol/L


 


Chloride  108 H  112 H  ()  mmol/L


 


Carbon Dioxide  27  27  (22-30)  mmol/L


 


BUN  15  13  (7-17)  mg/dL


 


Creatinine  0.58  0.64  (0.52-1.04)  mg/dL


 


Glucose  99  97  (74-99)  mg/dL


 


Calcium  9.7  8.3 L  (8.4-10.2)  mg/dL


 


AST  25   (14-36)  U/L


 


ALT  28   (9-52)  U/L


 


Alkaline Phosphatase  82   ()  U/L


 


Total Protein  6.5   (6.3-8.2)  g/dL


 


Albumin  3.7   (3.5-5.0)  g/dL








                                  Calcium panel











  19 Range/Units





  19:20 06:46 


 


Calcium  9.7  8.3 L  (8.4-10.2)  mg/dL


 


Albumin  3.7   (3.5-5.0)  g/dL








                                 Pituitary panel











  19 Range/Units





  19:20 06:46 


 


Sodium  142  144  (137-145)  mmol/L


 


Potassium  4.0  4.2  (3.5-5.1)  mmol/L


 


Chloride  108 H  112 H  ()  mmol/L


 


Carbon Dioxide  27  27  (22-30)  mmol/L


 


BUN  15  13  (7-17)  mg/dL


 


Creatinine  0.58  0.64  (0.52-1.04)  mg/dL


 


Glucose  99  97  (74-99)  mg/dL


 


Calcium  9.7  8.3 L  (8.4-10.2)  mg/dL








                                  Adrenal panel











  19 Range/Units





  19:20 06:46 


 


Sodium  142  144  (137-145)  mmol/L


 


Potassium  4.0  4.2  (3.5-5.1)  mmol/L


 


Chloride  108 H  112 H  ()  mmol/L


 


Carbon Dioxide  27  27  (22-30)  mmol/L


 


BUN  15  13  (7-17)  mg/dL


 


Creatinine  0.58  0.64  (0.52-1.04)  mg/dL


 


Glucose  99  97  (74-99)  mg/dL


 


Calcium  9.7  8.3 L  (8.4-10.2)  mg/dL


 


Total Bilirubin  0.8   (0.2-1.3)  mg/dL


 


AST  25   (14-36)  U/L


 


ALT  28   (9-52)  U/L


 


Alkaline Phosphatase  82   ()  U/L


 


Total Protein  6.5   (6.3-8.2)  g/dL


 


Albumin  3.7   (3.5-5.0)  g/dL

## 2019-04-03 NOTE — P.HPIM
History of Present Illness


H&P Date: 19


Chief Complaint: Syncope/partial small bowel obstruction.





This is a 79-year-old  female in Dr. Reynoso with a previous medical

history significant for coronary artery disease status post PCI and stent 

placement of the obtuse marginal branch 1 back in 2018, hypertension and 

hypertensive cardio vascular disease with left ventricular hypertrophy, 

hyperlipidemia, history of uterine cancer back in , legal blindness due to 

glaucoma, mitral valve prolapse, history of DVT, patient stated that she was 

doing fine up until 3:00 in the afternoon after she had a negative a sandwich 

and the she developed to have increase abdominal pain with abdominal cramping 

associated with increased nausea with increased dry heaving, she went to the 

bathroom sadness toilet and she felt that she is going to pass out she called 

her  and her  called EMS who brought her to the ER for evaluation 

she was found to have an accelerated hypertension she did receive labetalol 20 

mg and hydralazine 20 mg and her blood pressure got better patient had a 

computed tomography scan of the abdomen pelvis with contrast that showed partial

small bowel obstruction, she was admitted to the hospital under my service and 

general surgery consultation was obtained from Dr. Batres initially the 

patient refused to have an NG tube, she did not vomit more than once in the ariel

or and once in the ER and eventually she was admitted to the floor and she was 

kept on nothing per mouth, I came and evaluated the patient and she has not had 

any abdominal pain no nausea no vomiting she continues to be nothing by mouth, 

she is asking for clear liquid diet, she has no IV fluid as well we will restart

her IV fluid as well as her medication as the patient did have a bowel movement 

with surgical consultation.





Review of Systems


Constitutional: Denies anorexia, Denies chronic headaches, Denies lethargy, 

Denies malaise, Denies weakness, Denies weight gain, Denies weight loss


Eyes: bilateral blurred vision


Ears: deny: decreased hearing


Ears, nose, mouth and throat: Denies dysphagia, Denies neck lump, Denies sore 

throat


Cardiovascular: Reports decreased exercise tolerance, Reports dyspnea on 

exertion, Reports shortness of breath, Reports syncope, Denies chest pain, 

Denies leg edema, Denies lightheadedness, Denies rapid heart beat


Respiratory: Denies congestion, Denies cough, Denies cough with sputum, Denies 

home oxygen, Denies sleep apnea, Denies snoring, Denies wheezing


Gastrointestinal: Reports abdominal pain, Reports belching, Reports bloating, 

Reports loss of appetite, Reports nausea, Reports vomiting, Denies melena


Genitourinary: Denies dysuria, Denies hematuria


Menstruation: Reports postmenopausal


Musculoskeletal: Denies myalgias


Musculoskeletal: absent: ankle pain, ankle stiffness, ankle swelling, elbow 

pain, elbow stiffness, elbow swelling, foot pain, foot stiffness, foot swelling,

hand pain, hand stiffness, hand swelling, hip pain, hip stiffness, hip swelling,

knee pain, knee stiffness, knee swelling, shoulder pain, shoulder stiffness, 

shoulder swelling, wrist pain, wrist stiffness, wrist swelling


Integumentary: Denies pruritus, Denies rash


Neurological: Denies numbness, Denies weakness


Psychiatric: Denies anxiety, Denies depression


Endocrine: Denies fatigue, Denies weight change





Past Medical History


Past Medical History: Coronary Artery Disease (CAD), Cancer, Deep Vein 

Thrombosis (DVT), Eye Disorder, GERD/Reflux, Hyperlipidemia, Hypertension, 

Myocardial Infarction (MI), Osteoarthritis (OA)


Additional Past Medical History / Comment(s): Bowel obstruction 2018 while 

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 

catherine legs, uterine cancer in , endometrial and cervical ca with radiation,


Last Myocardial Infarction Date:: 18


History of Any Multi-Drug Resistant Organisms: None Reported


Past Surgical History: Heart Catheterization With Stent, Hysterectomy


Additional Past Surgical History / Comment(s): eye surgery, breast biopsy


Past Anesthesia/Blood Transfusion Reactions: No Reported Reaction


Date of Last Stent Placement:: 


Smoking Status: Never smoker





- Past Family History


  ** Mother


Family Medical History: Cancer, Renal Disease


Additional Family Medical History / Comment(s): uterine CA





  ** Brother(s)


Family Medical History: Cancer


Additional Family Medical History / Comment(s): colon CA





  ** Sister(s)


Family Medical History: Cancer


Additional Family Medical History / Comment(s): colon and breast CA





  ** Father


Family Medical History: Myocardial Infarction (MI)


Additional Family Medical History / Comment(s):  at 54 from MI





Medications and Allergies


                                Home Medications











 Medication  Instructions  Recorded  Confirmed  Type


 


Gabapentin [Neurontin] 300 mg PO HS 18 History


 


Brimonidine Tartrate [Alphagan P 1 drops BOTH EYES TID 18 History





0.2% Ophth Soln]    


 


Dorzolamide/Timolol/Pf [Cosopt Pf 1 applicator BOTH EYES TID 18 

History





2%/5% Ophth Droperette]    


 


Furosemide [Lasix] 20 mg PO DAILY PRN 18 History


 


Aspirin EC [Ecotrin Low Dose] 81 mg PO DAILY #30 tablet. 18 Rx


 


Atorvastatin [Lipitor] 40 mg PO DAILY #30 tab 18 Rx


 


Clopidogrel [Plavix] 75 mg PO DAILY #30 tab 18 Rx


 


Metoprolol Tartrate [Lopressor] 25 mg PO BID #60 tab 18 Rx


 


Nitroglycerin Sl Tabs [Nitrostat] 0.4 mg SUBLINGUAL Q5M PRN #25 tab 18 Rx


 


Docusate Sodium [Dok] 100 mg PO HS 18 History


 


Calcium   1,000 mg PO DAILY 19 History


 


Famotidine [Pepcid] 20 mg PO DAILY PRN 19 History


 


Isosorbide Mononitrate ER [Imdur] 30 mg PO DAILY 19 History


 


Lisinopril [Zestril] 2.5 mg PO DAILY@1300 19 History


 


Potassium Chloride [Klor-Con 20] 20 meq PO DAILY 19 History








                                    Allergies











Allergy/AdvReac Type Severity Reaction Status Date / Time


 


ibuprofen [From Advil] AdvReac  Nausea & Verified 19 10:07





   Vomiting  














Physical Exam


Vitals: 


                                   Vital Signs











  Temp Pulse Pulse Pulse Pulse Resp BP


 


 19 16:00      80  16 


 


 19 15:27  98.2 F     81  16 


 


 19 12:00      80  17 


 


 19 08:00  98.3 F     85  17 


 


 19 04:00  98.5 F     85  17 


 


 19 00:00      82  17 


 


 19 23:50  98.4 F     82  17 


 


 19 22:45  98.0 F  73     20  169/68


 


 19 21:47  98 F  71     16  149/63


 


 19 20:58        192/81


 


 19 20:09   80     20  190/88


 


 19 18:45    68  64  60  20 


 


 19 17:30  98.0 F  59 L     16  154/67














  BP BP BP Pulse Ox


 


 19 16:00    175/72  97


 


 19 15:27    195/81 


 


 19 12:00    155/67  99


 


 19 08:00    146/67  98


 


 19 04:00    146/51  98


 


 19 00:00    


 


 19 23:50    180/70  98


 


 19 22:45     98


 


 19 21:47     96


 


 19 20:58    


 


 19 20:09     98


 


 19 18:45  175/72  181/80  181/74 


 


 19 17:30     100








                                Intake and Output











 19





 06:59 14:59 22:59


 


Intake Total 600  


 


Balance 600  


 


Intake:   


 


  Intake, IV Titration 600  





  Amount   


 


    Sodium Chloride 0.9% 1, 600  





    000 ml @ 100 mls/hr IV .   





    Q10H ONE Rx#:[id number]   


 


Other:   


 


  Voiding Method Bedside Commode  





 Diaper  


 


  # Voids 1 1 


 


  # Bowel Movements 1  


 


  Weight 52.3 kg  














- Constitutional


General appearance: no acute distress, thin





- EENT


Eyes: anicteric sclerae, EOMI, PERRLA, no ptosis, no scleral icterus


ENT: hearing grossly normal, NA/AT, normal oropharynx, no thrush


Ears: bilateral: normal





- Neck


Neck: no lymphadenopathy, normal ROM, no rigidity, no stridor, no thyromegaly


Carotids: bilateral: upstroke normal


Thyroid: bilateral: normal size





- Respiratory


Respiratory: bilateral: diminished, negative: dullness, rales, rhonchi, 

wheezing, prolonged expiration





- Cardiovascular


Rhythm: regular


Heart sounds: normal: S1, S2


Abnormal Heart Sounds: systolic murmur, no S3 Gallop, no S4 Gallop





- Gastrointestinal


General gastrointestinal: normal bowel sounds, soft, no splenomegaly, no 

tenderness, no umbilical hernia, no ventral hernia





- Integumentary


Integumentary: normal, normal turgor





- Neurologic


Neurologic: CNII-XII intact





- Musculoskeletal


Musculoskeletal: gait normal, strength equal bilaterally





- Psychiatric


Psychiatric: A&O x's 3, appropriate affect, intact judgment & insight





Results


CBC & Chem 7: 


                                 19 06:46





                                 19 06:46


Labs: 


                  Abnormal Lab Results - Last 24 Hours (Table)











  19 Range/Units





  19:20 19:20 06:46 


 


RBC    3.67 L  (3.80-5.40)  m/uL


 


Hgb    11.0 L  (11.4-16.0)  gm/dL


 


Lymphocytes #    0.8 L  (1.0-4.8)  k/uL


 


APTT   16.6 L   (22.0-30.0)  sec


 


Chloride  108 H    ()  mmol/L


 


Calcium     (8.4-10.2)  mg/dL














  19 Range/Units





  06:46 


 


RBC   (3.80-5.40)  m/uL


 


Hgb   (11.4-16.0)  gm/dL


 


Lymphocytes #   (1.0-4.8)  k/uL


 


APTT   (22.0-30.0)  sec


 


Chloride  112 H  ()  mmol/L


 


Calcium  8.3 L  (8.4-10.2)  mg/dL














Thrombosis Risk Factor Assmnt





- DVT/VTE Prophylaxis


DVT/VTE Prophylaxis: Pharmacologic Prophylaxis ordered, Mechanical Prophylaxis 

ordered





- Choose All That Apply


Each Risk Factor Represents 3 Points: Age 75 years or older, History of DVT/PE


Other congenital or acquired thrombophilia - If yes, enter type in comment: No


Thrombosis Risk Factor Assessment Total Risk Factor Score: 6


Thrombosis Risk Factor Assessment Level: High Risk





Assessment and Plan


Assessment: 





Assessment and plan:





1.  Partial small bowel obstruction.  We will check the patient abdominal x-ray 

today if it's better we will start the patient on clear liquid diet, patient has

no NG tube at this time we'll continue the IV fluid in the form of D5 half-

normal saline with 20 KCl at 75 mL an hour, continue to monitor the patient very

closely obtain surgical consultation.





2.  CAD post-PCI of the OM1.  Continue patient on aspirin 81 mg once every day, 

Plavix 75 mg orally once every day, metoprolol 25 mg orally twice every day, 

Lipitor 20 mg orally once every day and Imdur 30 mg orally once every day.





3.  Accelerated hypertension with hypertension and hypertensive cardiovascular 

disease.  Patient did receive labetalol and hydralazine we will resume meto

prolol 25 mg orally twice every day and lisinopril 2.5 mg orally once every day.





4.  Syncopal episode most likely vasovagal.  Monitor the patient very closely on

the telemetry unit to rule out any arrhythmias.





5.  Hyperlipidemia.  Continue patient on Lipitor 20 mg orally once every day.





6.  History of DVT in the past.  Resolved.





7.  History of glaucoma.  Continue with current eyedrops.





8.  History of endometrial and cervical cancer post radiation.





9.  DVT prophylaxis.  Heparin 5000 units subcutaneously every 8 hours.





10.  GI prophylaxis.  Continue PPI.





11.  Admitted to inpatient.  Estimate a length of stay 2 midnights.





12.  Patient is full code.

## 2019-04-04 LAB
ANION GAP SERPL CALC-SCNC: 3 MMOL/L
BASOPHILS # BLD AUTO: 0 K/UL (ref 0–0.2)
BASOPHILS NFR BLD AUTO: 0 %
BUN SERPL-SCNC: 7 MG/DL (ref 7–17)
CALCIUM SPEC-MCNC: 8.2 MG/DL (ref 8.4–10.2)
CHLORIDE SERPL-SCNC: 111 MMOL/L (ref 98–107)
CO2 SERPL-SCNC: 27 MMOL/L (ref 22–30)
EOSINOPHIL # BLD AUTO: 0.1 K/UL (ref 0–0.7)
EOSINOPHIL NFR BLD AUTO: 1 %
ERYTHROCYTE [DISTWIDTH] IN BLOOD BY AUTOMATED COUNT: 3.52 M/UL (ref 3.8–5.4)
ERYTHROCYTE [DISTWIDTH] IN BLOOD: 14.9 % (ref 11.5–15.5)
GLUCOSE BLD-MCNC: 109 MG/DL (ref 75–99)
GLUCOSE BLD-MCNC: 113 MG/DL (ref 75–99)
GLUCOSE BLD-MCNC: 141 MG/DL (ref 75–99)
GLUCOSE SERPL-MCNC: 89 MG/DL (ref 74–99)
HCT VFR BLD AUTO: 33.3 % (ref 34–46)
HGB BLD-MCNC: 10.7 GM/DL (ref 11.4–16)
LYMPHOCYTES # SPEC AUTO: 1.1 K/UL (ref 1–4.8)
LYMPHOCYTES NFR SPEC AUTO: 26 %
MCH RBC QN AUTO: 30.3 PG (ref 25–35)
MCHC RBC AUTO-ENTMCNC: 32 G/DL (ref 31–37)
MCV RBC AUTO: 94.5 FL (ref 80–100)
MONOCYTES # BLD AUTO: 0.3 K/UL (ref 0–1)
MONOCYTES NFR BLD AUTO: 8 %
NEUTROPHILS # BLD AUTO: 2.7 K/UL (ref 1.3–7.7)
NEUTROPHILS NFR BLD AUTO: 62 %
PLATELET # BLD AUTO: 220 K/UL (ref 150–450)
POTASSIUM SERPL-SCNC: 3.8 MMOL/L (ref 3.5–5.1)
SODIUM SERPL-SCNC: 141 MMOL/L (ref 137–145)
WBC # BLD AUTO: 4.3 K/UL (ref 3.8–10.6)

## 2019-04-04 RX ADMIN — DEXTROSE MONOHYDRATE, SODIUM CHLORIDE, AND POTASSIUM CHLORIDE SCH MLS/HR: 50; 4.5; 1.49 INJECTION, SOLUTION INTRAVENOUS at 06:11

## 2019-04-04 RX ADMIN — METOPROLOL TARTRATE SCH MG: 25 TABLET, FILM COATED ORAL at 19:51

## 2019-04-04 RX ADMIN — CLOPIDOGREL BISULFATE SCH MG: 75 TABLET ORAL at 09:01

## 2019-04-04 RX ADMIN — HEPARIN SODIUM SCH UNIT: 5000 INJECTION, SOLUTION INTRAVENOUS; SUBCUTANEOUS at 09:01

## 2019-04-04 RX ADMIN — BRIMONIDINE TARTRATE SCH DROPS: 2 SOLUTION/ DROPS OPHTHALMIC at 19:58

## 2019-04-04 RX ADMIN — HEPARIN SODIUM SCH UNIT: 5000 INJECTION, SOLUTION INTRAVENOUS; SUBCUTANEOUS at 19:51

## 2019-04-04 RX ADMIN — BRIMONIDINE TARTRATE SCH DROPS: 2 SOLUTION/ DROPS OPHTHALMIC at 17:12

## 2019-04-04 RX ADMIN — ISOSORBIDE MONONITRATE SCH MG: 30 TABLET, EXTENDED RELEASE ORAL at 09:01

## 2019-04-04 RX ADMIN — ASPIRIN 81 MG CHEWABLE TABLET SCH MG: 81 TABLET CHEWABLE at 09:01

## 2019-04-04 RX ADMIN — BRIMONIDINE TARTRATE SCH DROPS: 2 SOLUTION/ DROPS OPHTHALMIC at 09:00

## 2019-04-04 RX ADMIN — DORZOLAMIDE HYDROCHLORIDE AND TIMOLOL MALEATE SCH DROPS: 20; 5 SOLUTION/ DROPS OPHTHALMIC at 19:52

## 2019-04-04 RX ADMIN — GABAPENTIN SCH MG: 300 CAPSULE ORAL at 19:51

## 2019-04-04 RX ADMIN — PANTOPRAZOLE SODIUM SCH MG: 40 INJECTION, POWDER, FOR SOLUTION INTRAVENOUS at 09:01

## 2019-04-04 RX ADMIN — METOPROLOL TARTRATE SCH MG: 25 TABLET, FILM COATED ORAL at 09:01

## 2019-04-04 RX ADMIN — DEXTROSE MONOHYDRATE, SODIUM CHLORIDE, AND POTASSIUM CHLORIDE SCH: 50; 4.5; 1.49 INJECTION, SOLUTION INTRAVENOUS at 00:54

## 2019-04-04 RX ADMIN — LISINOPRIL SCH MG: 2.5 TABLET ORAL at 12:36

## 2019-04-04 RX ADMIN — DORZOLAMIDE HYDROCHLORIDE AND TIMOLOL MALEATE SCH DROPS: 20; 5 SOLUTION/ DROPS OPHTHALMIC at 17:12

## 2019-04-05 RX ADMIN — BRIMONIDINE TARTRATE SCH DROPS: 2 SOLUTION/ DROPS OPHTHALMIC at 21:59

## 2019-04-05 RX ADMIN — ISOSORBIDE MONONITRATE SCH MG: 30 TABLET, EXTENDED RELEASE ORAL at 07:34

## 2019-04-05 RX ADMIN — DORZOLAMIDE HYDROCHLORIDE AND TIMOLOL MALEATE SCH DROPS: 20; 5 SOLUTION/ DROPS OPHTHALMIC at 07:35

## 2019-04-05 RX ADMIN — METOPROLOL TARTRATE SCH MG: 25 TABLET, FILM COATED ORAL at 21:57

## 2019-04-05 RX ADMIN — LISINOPRIL SCH MG: 2.5 TABLET ORAL at 13:13

## 2019-04-05 RX ADMIN — DORZOLAMIDE HYDROCHLORIDE AND TIMOLOL MALEATE SCH DROPS: 20; 5 SOLUTION/ DROPS OPHTHALMIC at 15:09

## 2019-04-05 RX ADMIN — CLOPIDOGREL BISULFATE SCH MG: 75 TABLET ORAL at 07:34

## 2019-04-05 RX ADMIN — DEXTROSE MONOHYDRATE, SODIUM CHLORIDE, AND POTASSIUM CHLORIDE SCH MLS/HR: 50; 4.5; 1.49 INJECTION, SOLUTION INTRAVENOUS at 17:12

## 2019-04-05 RX ADMIN — GABAPENTIN SCH MG: 300 CAPSULE ORAL at 21:57

## 2019-04-05 RX ADMIN — HEPARIN SODIUM SCH UNIT: 5000 INJECTION, SOLUTION INTRAVENOUS; SUBCUTANEOUS at 21:52

## 2019-04-05 RX ADMIN — PANTOPRAZOLE SODIUM SCH MG: 40 INJECTION, POWDER, FOR SOLUTION INTRAVENOUS at 07:35

## 2019-04-05 RX ADMIN — DEXTROSE MONOHYDRATE, SODIUM CHLORIDE, AND POTASSIUM CHLORIDE SCH MLS/HR: 50; 4.5; 1.49 INJECTION, SOLUTION INTRAVENOUS at 07:35

## 2019-04-05 RX ADMIN — DORZOLAMIDE HYDROCHLORIDE AND TIMOLOL MALEATE SCH DROPS: 20; 5 SOLUTION/ DROPS OPHTHALMIC at 21:59

## 2019-04-05 RX ADMIN — ASPIRIN 81 MG CHEWABLE TABLET SCH MG: 81 TABLET CHEWABLE at 07:34

## 2019-04-05 RX ADMIN — BRIMONIDINE TARTRATE SCH DROPS: 2 SOLUTION/ DROPS OPHTHALMIC at 07:35

## 2019-04-05 RX ADMIN — DEXTROSE MONOHYDRATE, SODIUM CHLORIDE, AND POTASSIUM CHLORIDE SCH MLS/HR: 50; 4.5; 1.49 INJECTION, SOLUTION INTRAVENOUS at 01:32

## 2019-04-05 RX ADMIN — METOPROLOL TARTRATE SCH MG: 25 TABLET, FILM COATED ORAL at 07:35

## 2019-04-05 RX ADMIN — DEXTROSE MONOHYDRATE, SODIUM CHLORIDE, AND POTASSIUM CHLORIDE SCH MLS/HR: 50; 4.5; 1.49 INJECTION, SOLUTION INTRAVENOUS at 20:26

## 2019-04-05 RX ADMIN — BRIMONIDINE TARTRATE SCH DROPS: 2 SOLUTION/ DROPS OPHTHALMIC at 15:09

## 2019-04-05 RX ADMIN — HEPARIN SODIUM SCH UNIT: 5000 INJECTION, SOLUTION INTRAVENOUS; SUBCUTANEOUS at 07:34

## 2019-04-06 VITALS — RESPIRATION RATE: 16 BRPM

## 2019-04-06 RX ADMIN — GABAPENTIN SCH MG: 300 CAPSULE ORAL at 20:33

## 2019-04-06 RX ADMIN — CLOPIDOGREL BISULFATE SCH MG: 75 TABLET ORAL at 07:33

## 2019-04-06 RX ADMIN — BRIMONIDINE TARTRATE SCH DROPS: 2 SOLUTION/ DROPS OPHTHALMIC at 17:05

## 2019-04-06 RX ADMIN — DORZOLAMIDE HYDROCHLORIDE AND TIMOLOL MALEATE SCH DROPS: 20; 5 SOLUTION/ DROPS OPHTHALMIC at 22:18

## 2019-04-06 RX ADMIN — BRIMONIDINE TARTRATE SCH DROPS: 2 SOLUTION/ DROPS OPHTHALMIC at 09:40

## 2019-04-06 RX ADMIN — DORZOLAMIDE HYDROCHLORIDE AND TIMOLOL MALEATE SCH DROPS: 20; 5 SOLUTION/ DROPS OPHTHALMIC at 08:44

## 2019-04-06 RX ADMIN — ISOSORBIDE MONONITRATE SCH MG: 30 TABLET, EXTENDED RELEASE ORAL at 07:33

## 2019-04-06 RX ADMIN — METOPROLOL TARTRATE SCH MG: 25 TABLET, FILM COATED ORAL at 20:33

## 2019-04-06 RX ADMIN — ASPIRIN 81 MG CHEWABLE TABLET SCH MG: 81 TABLET CHEWABLE at 07:33

## 2019-04-06 RX ADMIN — HEPARIN SODIUM SCH UNIT: 5000 INJECTION, SOLUTION INTRAVENOUS; SUBCUTANEOUS at 07:33

## 2019-04-06 RX ADMIN — BRIMONIDINE TARTRATE SCH DROPS: 2 SOLUTION/ DROPS OPHTHALMIC at 22:18

## 2019-04-06 RX ADMIN — DORZOLAMIDE HYDROCHLORIDE AND TIMOLOL MALEATE SCH DROPS: 20; 5 SOLUTION/ DROPS OPHTHALMIC at 17:05

## 2019-04-06 RX ADMIN — METOPROLOL TARTRATE SCH MG: 25 TABLET, FILM COATED ORAL at 07:33

## 2019-04-06 RX ADMIN — DEXTROSE MONOHYDRATE, SODIUM CHLORIDE, AND POTASSIUM CHLORIDE SCH MLS/HR: 50; 4.5; 1.49 INJECTION, SOLUTION INTRAVENOUS at 19:34

## 2019-04-06 RX ADMIN — LISINOPRIL SCH MG: 2.5 TABLET ORAL at 14:31

## 2019-04-06 RX ADMIN — DEXTROSE MONOHYDRATE, SODIUM CHLORIDE, AND POTASSIUM CHLORIDE SCH: 50; 4.5; 1.49 INJECTION, SOLUTION INTRAVENOUS at 13:48

## 2019-04-06 RX ADMIN — PANTOPRAZOLE SODIUM SCH MG: 40 INJECTION, POWDER, FOR SOLUTION INTRAVENOUS at 07:33

## 2019-04-06 RX ADMIN — HYDRALAZINE HYDROCHLORIDE PRN MG: 20 INJECTION INTRAMUSCULAR; INTRAVENOUS at 06:07

## 2019-04-06 RX ADMIN — HEPARIN SODIUM SCH UNIT: 5000 INJECTION, SOLUTION INTRAVENOUS; SUBCUTANEOUS at 20:34

## 2019-04-07 VITALS — HEART RATE: 80 BPM

## 2019-04-07 VITALS — TEMPERATURE: 98.9 F | DIASTOLIC BLOOD PRESSURE: 71 MMHG | SYSTOLIC BLOOD PRESSURE: 130 MMHG

## 2019-04-07 LAB
ANION GAP SERPL CALC-SCNC: 6 MMOL/L
BASOPHILS # BLD AUTO: 0 K/UL (ref 0–0.2)
BASOPHILS NFR BLD AUTO: 0 %
BUN SERPL-SCNC: 7 MG/DL (ref 7–17)
CALCIUM SPEC-MCNC: 8.9 MG/DL (ref 8.4–10.2)
CHLORIDE SERPL-SCNC: 108 MMOL/L (ref 98–107)
CO2 SERPL-SCNC: 27 MMOL/L (ref 22–30)
EOSINOPHIL # BLD AUTO: 0.2 K/UL (ref 0–0.7)
EOSINOPHIL NFR BLD AUTO: 4 %
ERYTHROCYTE [DISTWIDTH] IN BLOOD BY AUTOMATED COUNT: 3.8 M/UL (ref 3.8–5.4)
ERYTHROCYTE [DISTWIDTH] IN BLOOD: 15.2 % (ref 11.5–15.5)
GLUCOSE SERPL-MCNC: 113 MG/DL (ref 74–99)
HCT VFR BLD AUTO: 36.1 % (ref 34–46)
HGB BLD-MCNC: 11.7 GM/DL (ref 11.4–16)
LYMPHOCYTES # SPEC AUTO: 0.9 K/UL (ref 1–4.8)
LYMPHOCYTES NFR SPEC AUTO: 20 %
MCH RBC QN AUTO: 30.7 PG (ref 25–35)
MCHC RBC AUTO-ENTMCNC: 32.3 G/DL (ref 31–37)
MCV RBC AUTO: 95 FL (ref 80–100)
MONOCYTES # BLD AUTO: 0.2 K/UL (ref 0–1)
MONOCYTES NFR BLD AUTO: 5 %
NEUTROPHILS # BLD AUTO: 2.9 K/UL (ref 1.3–7.7)
NEUTROPHILS NFR BLD AUTO: 68 %
PLATELET # BLD AUTO: 260 K/UL (ref 150–450)
POTASSIUM SERPL-SCNC: 3.7 MMOL/L (ref 3.5–5.1)
SODIUM SERPL-SCNC: 141 MMOL/L (ref 137–145)
WBC # BLD AUTO: 4.3 K/UL (ref 3.8–10.6)

## 2019-04-07 RX ADMIN — DEXTROSE MONOHYDRATE, SODIUM CHLORIDE, AND POTASSIUM CHLORIDE SCH MLS/HR: 50; 4.5; 1.49 INJECTION, SOLUTION INTRAVENOUS at 07:11

## 2019-04-07 RX ADMIN — DORZOLAMIDE HYDROCHLORIDE AND TIMOLOL MALEATE SCH DROPS: 20; 5 SOLUTION/ DROPS OPHTHALMIC at 07:16

## 2019-04-07 RX ADMIN — CLOPIDOGREL BISULFATE SCH MG: 75 TABLET ORAL at 07:12

## 2019-04-07 RX ADMIN — HEPARIN SODIUM SCH UNIT: 5000 INJECTION, SOLUTION INTRAVENOUS; SUBCUTANEOUS at 07:12

## 2019-04-07 RX ADMIN — BRIMONIDINE TARTRATE SCH DROPS: 2 SOLUTION/ DROPS OPHTHALMIC at 07:15

## 2019-04-07 RX ADMIN — HYDRALAZINE HYDROCHLORIDE PRN MG: 20 INJECTION INTRAMUSCULAR; INTRAVENOUS at 04:49

## 2019-04-07 RX ADMIN — ASPIRIN 81 MG CHEWABLE TABLET SCH MG: 81 TABLET CHEWABLE at 07:12

## 2019-04-07 RX ADMIN — ISOSORBIDE MONONITRATE SCH MG: 30 TABLET, EXTENDED RELEASE ORAL at 07:12

## 2019-04-07 RX ADMIN — PANTOPRAZOLE SODIUM SCH MG: 40 INJECTION, POWDER, FOR SOLUTION INTRAVENOUS at 07:11

## 2019-04-07 RX ADMIN — METOPROLOL TARTRATE SCH MG: 25 TABLET, FILM COATED ORAL at 07:12

## 2019-04-07 RX ADMIN — LISINOPRIL SCH MG: 2.5 TABLET ORAL at 14:32

## 2019-04-07 NOTE — P.DS
Providers


Date of admission: 


04/02/19 20:54





Attending physician: 


Isauro Hwang





Consults: 





                                        





04/02/19 20:52


Consult Physician Urgent 


   Consulting Provider: Saravanan Batres


   Consult Reason/Comments: Partial small bowel obstruction


   Do you want consulting provider notified?: Yes











Primary care physician: 


Ashwin SimpsonWabeno





[facility] Course: 








This is a 79-year-old  female in Dr. Reynoso with a previous medical

history significant for coronary artery disease status post PCI and stent 

placement of the obtuse marginal branch 1 back in June 2018, hypertension and 

hypertensive cardio vascular disease with left ventricular hypertrophy, 

hyperlipidemia, history of uterine cancer back in 2010, legal blindness due to 

glaucoma, mitral valve prolapse, history of DVT, patient stated that she was 

doing fine up until 3:00 in the afternoon after she had a negative a sandwich 

and the she developed to have increase abdominal pain with abdominal cramping 

associated with increased nausea with increased dry heaving, she went to the 

bathroom sadness toilet and she felt that she is going to pass out she called 

her  and her  called EMS who brought her to the ER for evaluation 

she was found to have an accelerated hypertension she did receive labetalol 20 

mg and hydralazine 20 mg and her blood pressure got better patient had a 

computed tomography scan of the abdomen pelvis with contrast that showed partial

small bowel obstruction, she was admitted to the hospital under my service and 

general surgery consultation was obtained from Dr. Batres initially the 

patient refused to have an NG tube, she did not vomit more than once in the 

floor and once in the ER and eventually she was admitted to the floor and she 

was kept on nothing per mouth, I came and evaluated the patient and she has not 

had any abdominal pain no nausea no vomiting she continues to be nothing by 

mouth, she is asking for clear liquid diet, she has no IV fluid as well we will 

restart her IV fluid as well as her medication as the patient did have a bowel 

movement with surgical consultation.





4/4: Patient is currently tolerating a full liquid diet.  Patient has been seen 

and followed by Dr. Sam of and he recommends continuing full liquids today.  

Patient states her abdomen is less sore today.  She is passing gas but no bowel 

movement.  Anticipate she'll probably be ready for discharge tomorrow.  Patient 

has been afebrile, heart rate in the 50s to 70s, blood pressure 145/65, pulse ox

100% on room air.  WBC 4.3, hemoglobin 10.7, platelet 220.  Sodium 141, 

potassium 3.8, chloride 111, CO2 27, BUN 7 and creatinine 0.54.





4/5:patient states she ate this morning and is passing gas but has not had a 

bowel movement.  Her abdomen is soft.  She states she feels like she is near 

having a bowel movement does not want to have a suppository.  She states she is 

ambulated in the hallway this morning.  She is currently tolerating full liquid 

diet.  She has been afebrile, heart rate in the 60s, blood pressure 153/73 and 

pulse ox 100% on room air.  Patient does state that she is not drinking very 

much so we will continue IV fluids for now.  Anticipate she will be ready for 

discharge by tomorrow.





4/6: Patient has no bowel movement, has attempted to going to bathroom, still no

bowel movement, no nausea, last bowel movement was 4days agogeneral surgery once

to keep her another day, fromlack of bowel movement,patient had sandwich today, 

no vomiting patient denies any melena hematochezia, and no chest pain or 

palpitations.no new labs for review, obtain one the morning


\


4/7, bowel movement today, no nausea no vomiting, still on full liquid diet, 

recommendation per general surgery.  Diet on discharge, follow-up with PCP 1 

week, follow-up with Dr. webb signing a one week,








 Final diagnosis 


 


1.  Partial small bowel obstruction resolved, medical management only.  Resolved

full liquid diet recommendation per general surgery post discharge 


2.  CAD post-PCI of the OM1.  Continue patient on aspirin 81 mg once every day, 

Plavix 75 mg orally once every day, metoprolol 25 mg orally twice every day, 

Lipitor 20 mg orally once every day and Imdur 30 mg orally once every day.


3.  Urgent hypertension with hypertension and hypertensive cardiovascular 

disease improved.  Patient did receive labetalol and hydralazine we will resume 

metoprolol 25 mg orally twice every day and lisinopril 2.5 mg orally once every 

day.


4.  Syncopal episode most likely vasovagal no further episodes ruled out 

arrhythmias.  


5.  Hyperlipidemia.  Continue patient on Lipitor 20 mg orally once every day.


6.  History of DVT in the past.  Resolved.


7.  History of glaucoma.  Continue with current eyedrops.


8.  History of endometrial and cervical cancer post radiation.


9.  DVT prophylaxis.  Heparin 5000 units subcutaneously every 8 hours.


10.  GI prophylaxis.  Continue PPI.


11.  Patient is full code.








Discharge Medication List





Gabapentin [Neurontin] 300 mg PO HS 01/16/18 [History]


Brimonidine Tartrate [Alphagan P 0.2% Ophth Soln] 1 drops BOTH EYES TID 04/13/18

[History]


Dorzolamide/Timolol/Pf [Cosopt Pf 2%/5% Ophth Droperette] 1 applicator BOTH EYES

TID 04/13/18 [History]


Furosemide [Lasix] 20 mg PO DAILY PRN 04/13/18 [History]


Aspirin EC [Ecotrin Low Dose] 81 mg PO DAILY #30 tablet. 06/02/18 [Rx]


Atorvastatin [Lipitor] 40 mg PO DAILY #30 tab 06/02/18 [Rx]


Clopidogrel [Plavix] 75 mg PO DAILY #30 tab 06/02/18 [Rx]


Metoprolol Tartrate [Lopressor] 25 mg PO BID #60 tab 06/02/18 [Rx]


Nitroglycerin Sl Tabs [Nitrostat] 0.4 mg SUBLINGUAL Q5M PRN #25 tab 06/02/18 [Rx

]


Docusate Sodium [Dok] 100 mg PO HS 09/09/18 [History]


Calcium   1,000 mg PO DAILY 03/26/19 [History]


Famotidine [Pepcid] 20 mg PO DAILY PRN 03/26/19 [History]


Isosorbide Mononitrate ER [Imdur] 30 mg PO DAILY 03/26/19 [History]


Lisinopril [Zestril] 2.5 mg PO DAILY@1300 03/26/19 [History]


Potassium Chloride [Klor-Con 20] 20 meq PO DAILY 04/03/19 [History]


Famotidine [Pepcid] 20 mg PO DAILY #30 tablet 04/07/19 [Rx]








Patient Condition at Discharge: Good





Plan - Discharge Summary


Discharge Rx Participant: No


New Discharge Prescriptions: 


New


   Famotidine [Pepcid] 20 mg PO DAILY #30 tablet





Continue


   Gabapentin [Neurontin] 300 mg PO HS


   Brimonidine Tartrate [Alphagan P 0.2% Ophth Soln] 1 drops BOTH EYES TID


   Furosemide [Lasix] 20 mg PO DAILY PRN


     PRN Reason: Edema


   Dorzolamide/Timolol/Pf [Cosopt Pf 2%/5% Ophth Droperette] 1 applicator BOTH 

EYES TID


   Atorvastatin [Lipitor] 40 mg PO DAILY #30 tab


   Clopidogrel [Plavix] 75 mg PO DAILY #30 tab


   Metoprolol Tartrate [Lopressor] 25 mg PO BID #60 tab


   Nitroglycerin Sl Tabs [Nitrostat] 0.4 mg SUBLINGUAL Q5M PRN #25 tab


     PRN Reason: Chest Pain


   Aspirin EC [Ecotrin Low Dose] 81 mg PO DAILY #30 tablet.


   Docusate Sodium [Dok] 100 mg PO HS


   Lisinopril [Zestril] 2.5 mg PO DAILY@1300


   Isosorbide Mononitrate ER [Imdur] 30 mg PO DAILY


   Calcium   1,000 mg PO DAILY


   Famotidine [Pepcid] 20 mg PO DAILY PRN


     PRN Reason: gerd


   Potassium Chloride [Klor-Con 20] 20 meq PO DAILY


Discharge Medication List





Gabapentin [Neurontin] 300 mg PO HS 01/16/18 [History]


Brimonidine Tartrate [Alphagan P 0.2% Ophth Soln] 1 drops BOTH EYES TID 04/13/18

[History]


Dorzolamide/Timolol/Pf [Cosopt Pf 2%/5% Ophth Droperette] 1 applicator BOTH EYES

TID 04/13/18 [History]


Furosemide [Lasix] 20 mg PO DAILY PRN 04/13/18 [History]


Aspirin EC [Ecotrin Low Dose] 81 mg PO DAILY #30 tablet. 06/02/18 [Rx]


Atorvastatin [Lipitor] 40 mg PO DAILY #30 tab 06/02/18 [Rx]


Clopidogrel [Plavix] 75 mg PO DAILY #30 tab 06/02/18 [Rx]


Metoprolol Tartrate [Lopressor] 25 mg PO BID #60 tab 06/02/18 [Rx]


Nitroglycerin Sl Tabs [Nitrostat] 0.4 mg SUBLINGUAL Q5M PRN #25 tab 06/02/18 

[Rx]


Docusate Sodium [Dok] 100 mg PO HS 09/09/18 [History]


Calcium   1,000 mg PO DAILY 03/26/19 [History]


Famotidine [Pepcid] 20 mg PO DAILY PRN 03/26/19 [History]


Isosorbide Mononitrate ER [Imdur] 30 mg PO DAILY 03/26/19 [History]


Lisinopril [Zestril] 2.5 mg PO DAILY@1300 03/26/19 [History]


Potassium Chloride [Klor-Con 20] 20 meq PO DAILY 04/03/19 [History]


Famotidine [Pepcid] 20 mg PO DAILY #30 tablet 04/07/19 [Rx]








Follow up Appointment(s)/Referral(s): 


Ashwin Reynoso DO [Primary Care Provider] - 1-2 days


Saravanan Batres MD [STAFF PHYSICIAN] - 1 Week


Patient Instructions/Handouts:  Hypertension (DC), Bowel Obstruction (DC), Full 

Liquid Diet (DC)


Activity/Diet/Wound Care/Special Instructions: 


follow a full liquid diet for a week


Discharge Disposition: HOME SELF-CARE

## 2019-04-07 NOTE — P.PN
Progress Note - Text


Progress Note Date: 04/06/19





The patient has passed flatus.  She denies any significant bowel movements.  She

still has some minimal pain.





On exam her vital signs are stable.  Her abdomen soft.  There is some minimal 

distention.





Resolving partial small bowel structure.  Patient will continue be observed.
Progress Note - Text


Progress Note Date: 04/07/19





Patient feels better.  She's had 2 small bowel movements yesterday.





On exam her vital signs are stable.  Her abdomen soft.





Resolving partial small bowel structure.  Patient will be discharged home per 

medicine.
Subjective


Progress Note Date: 04/04/19





CHIEF COMPLAINT: abdominal pain





HISTORY OF PRESENT ILLNESS: Patient examined at the bedside. Reports improvement

in abdominal pain. Passing flatus. Denies bowel movement. Tolerating full liquid

diet. 





PHYSICAL EXAM: 


VITAL SIGNS: Reviewed.


GENERAL: Well-developed in no acute distress. 


HEENT:  No sclera icterus. Extraocular movements grossly intact.  Moist buccal 

mucosa. Head is atraumatic, normocephalic. 


ABDOMEN:  Soft.  Nondistended. Nontender. 


NEUROLOGIC: Alert and oriented. Cranial nerves II through XII grossly intact.





ASSESSMENT: 


1.  Abdominal pain, nausea, vomiting x 1 day


2.  Partial small bowel obstruction


3.  History of multiple small bowel obstructions, most recent February 2019, all

resolved with conservative management


4.  History of EGD on 03/28/2019 revealing mild gastritis and small hiatal 

hernia





PLAN: 


Continue full liquid diet. Do not advance until patient has a bowel movement per

Dr. Batres. 





Nurse practitioner note has been reviewed by physician. Signing provider agrees 

with the documented findings, assessment, and plan of care. 











Objective





- Vital Signs


Vital signs: 


                                   Vital Signs











Temp  98.2 F   04/04/19 08:00


 


Pulse  73   04/04/19 08:00


 


Resp  20   04/04/19 08:00


 


BP  173/73   04/04/19 08:00


 


Pulse Ox  99   04/04/19 08:00








                                 Intake & Output











 04/03/19 04/04/19 04/04/19





 18:59 06:59 18:59


 


Intake Total  1350 120


 


Output Total 600 400 


 


Balance -600 950 120


 


Weight  52.4 kg 


 


Intake:   


 


  Intake, IV Titration  1100 





  Amount   


 


    Sodium Chloride 0.9% 1,  1100 





    000 ml @ 100 mls/hr IV .   





    Q10H ONE Rx#:[id number]   


 


  Oral  250 120


 


Output:   


 


  Urine 600 400 


 


Other:   


 


  Voiding Method  Bedside Commode 





  Diaper 


 


  # Voids 1  1














- Labs


CBC & Chem 7: 


                                 04/04/19 06:06





                                 04/04/19 06:06


Labs: 


                  Abnormal Lab Results - Last 24 Hours (Table)











  04/04/19 04/04/19 Range/Units





  06:06 06:06 


 


RBC  3.52 L   (3.80-5.40)  m/uL


 


Hgb  10.7 L   (11.4-16.0)  gm/dL


 


Hct  33.3 L   (34.0-46.0)  %


 


Chloride   111 H  ()  mmol/L


 


Calcium   8.2 L  (8.4-10.2)  mg/dL
Subjective


Progress Note Date: 04/04/19





This is a 79-year-old  female in Dr. Reynoso with a previous medical

history significant for coronary artery disease status post PCI and stent 

placement of the obtuse marginal branch 1 back in June 2018, hypertension and 

hypertensive cardio vascular disease with left ventricular hypertrophy, h

yperlipidemia, history of uterine cancer back in 2010, legal blindness due to 

glaucoma, mitral valve prolapse, history of DVT, patient stated that she was 

doing fine up until 3:00 in the afternoon after she had a negative a sandwich 

and the she developed to have increase abdominal pain with abdominal cramping 

associated with increased nausea with increased dry heaving, she went to the 

bathroom sadness toilet and she felt that she is going to pass out she called 

her  and her  called EMS who brought her to the ER for evaluation 

she was found to have an accelerated hypertension she did receive labetalol 20 

mg and hydralazine 20 mg and her blood pressure got better patient had a co

mputed tomography scan of the abdomen pelvis with contrast that showed partial 

small bowel obstruction, she was admitted to the hospital under my service and 

general surgery consultation was obtained from Dr. Batres initially the 

patient refused to have an NG tube, she did not vomit more than once in the 

floor and once in the ER and eventually she was admitted to the floor and she 

was kept on nothing per mouth, I came and evaluated the patient and she has not 

had any abdominal pain no nausea no vomiting she continues to be nothing by 

mouth, she is asking for clear liquid diet, she has no IV fluid as well we will 

restart her IV fluid as well as her medication as the patient did have a bowel 

movement with surgical consultation.





4/4: Patient is currently tolerating a full liquid diet.  Patient has been seen 

and followed by Dr. Sam of and he recommends continuing full liquids today.  

Patient states her abdomen is less sore today.  She is passing gas but no bowel 

movement.  Anticipate she'll probably be ready for discharge tomorrow.  Patient 

has been afebrile, heart rate in the 50s to 70s, blood pressure 145/65, pulse ox

100% on room air.  WBC 4.3, hemoglobin 10.7, platelet 220.  Sodium 141, 

potassium 3.8, chloride 111, CO2 27, BUN 7 and creatinine 0.54.








Review of Systems


Constitutional: Denies anorexia, Denies chronic headaches, Denies lethargy, 

Denies malaise, Denies weakness


Eyes: bilateral blurred vision


Ears: deny: decreased hearing


Ears, nose, mouth and throat: Denies dysphagia, Denies neck lump, Denies sore 

throat


Cardiovascular: Reports decreased exercise tolerance, Reports dyspnea on 

exertion, Reports shortness of breath, Reports syncope, Denies chest pain, 

Denies leg edema, Denies lightheadedness, Denies rapid heart beat


Respiratory: Denies congestion, Denies cough, Denies cough with sputum, Denies 

home oxygen, Denies sleep apnea, Denies snoring, Denies wheezing


Gastrointestinal: Reports abdominal pain, Reports belching, Reports bloating, 

Reports loss of appetite, Reports nausea, Reports vomiting, Denies melena


Genitourinary: Denies dysuria, Denies hematuria


Menstruation: Reports postmenopausal


Musculoskeletal: Denies myalgias


Integumentary: Denies pruritus, Denies rash


Neurological: Denies numbness, Denies weakness


Psychiatric: Denies anxiety, Denies depression


Endocrine: Denies fatigue, Denies weight change








Objective





- Vital Signs


Vital signs: 


                                   Vital Signs











Temp  98.2 F   04/04/19 08:00


 


Pulse  73   04/04/19 08:00


 


Resp  20   04/04/19 08:00


 


BP  173/73   04/04/19 08:00


 


Pulse Ox  99   04/04/19 08:00








                                 Intake & Output











 04/03/19 04/04/19 04/04/19





 18:59 06:59 18:59


 


Intake Total  1350 120


 


Output Total 600 400 


 


Balance -600 950 120


 


Weight  52.4 kg 


 


Intake:   


 


  Intake, IV Titration  1100 





  Amount   


 


    Sodium Chloride 0.9% 1,  1100 





    000 ml @ 100 mls/hr IV .   





    Q10H ONE Rx#:[id number]   


 


  Oral  250 120


 


Output:   


 


  Urine 600 400 


 


Other:   


 


  Voiding Method  Bedside Commode 





  Diaper 


 


  # Voids 1  1














- Exam





General appearance: no acute distress, thin,  is at bedside.





- EENT


Eyes: anicteric sclerae, EOMI, PERRLA, no ptosis, no scleral icterus


ENT: hearing grossly normal, NA/AT, normal oropharynx, no thrush


Ears: bilateral: normal





- Neck


Neck: no lymphadenopathy, normal ROM, no rigidity, no stridor, no thyromegaly


Carotids: bilateral: upstroke normal


Thyroid: bilateral: normal size





- Respiratory


Respiratory: bilateral: diminished, negative: dullness, rales, rhonchi, whe

ezing, prolonged expiration





- Cardiovascular


Rhythm: regular


Heart sounds: normal: S1, S2


Abnormal Heart Sounds: systolic murmur, no S3 Gallop, no S4 Gallop





- Gastrointestinal


General gastrointestinal: normal bowel sounds, soft, no splenomegaly, no 

tenderness, no umbilical hernia, no ventral hernia





- Integumentary


Integumentary: normal, normal turgor





- Neurologic


Neurologic: CNII-XII intact





- Musculoskeletal


Musculoskeletal: gait normal, strength equal bilaterally





- Psychiatric


Psychiatric: A&O x's 3, appropriate affect, intact judgment & insight








- Labs


CBC & Chem 7: 


                                 04/04/19 06:06





                                 04/04/19 06:06


Labs: 


                  Abnormal Lab Results - Last 24 Hours (Table)











  04/04/19 04/04/19 Range/Units





  06:06 06:06 


 


RBC  3.52 L   (3.80-5.40)  m/uL


 


Hgb  10.7 L   (11.4-16.0)  gm/dL


 


Hct  33.3 L   (34.0-46.0)  %


 


Chloride   111 H  ()  mmol/L


 


Calcium   8.2 L  (8.4-10.2)  mg/dL














Assessment and Plan


Plan: 





1.  Partial small bowel obstruction.  Continue full liquid diet, continue D5 

half-normal saline with 20 KCl at 75 mL an hour, continue to monitor the patient

very closely obtain surgical consultation.





2.  CAD post-PCI of the OM1.  Continue patient on aspirin 81 mg once every day, 

Plavix 75 mg orally once every day, metoprolol 25 mg orally twice every day, 

Lipitor 20 mg orally once every day and Imdur 30 mg orally once every day.





3.  Accelerated hypertension with hypertension and hypertensive cardiovascular 

disease.  Patient did receive labetalol and hydralazine we will resume metoprolo

l 25 mg orally twice every day and lisinopril 2.5 mg orally once every day.





4.  Syncopal episode most likely vasovagal.  Monitor the patient very closely on

the telemetry unit to rule out any arrhythmias.





5.  Hyperlipidemia.  Continue patient on Lipitor 20 mg orally once every day.





6.  History of DVT in the past.  Resolved.





7.  History of glaucoma.  Continue with current eyedrops.





8.  History of endometrial and cervical cancer post radiation.





9.  DVT prophylaxis.  Heparin 5000 units subcutaneously every 8 hours.





10.  GI prophylaxis.  Continue PPI.





11.  Patient is full code.





Discharge plan: Most likely home tomorrow.





Impression and plan of care have been directed as dictated by the signing 

physician.  Yazmin Boogie nurse practitioner acting as scribe for signing 

physician.
Subjective


Progress Note Date: 04/05/19





CHIEF COMPLAINT: abdominal pain





HISTORY OF PRESENT ILLNESS: Patient examined at the bedside. Patient denies 

abdominal pain. Denies nausea or vomiting. Passing flatus. Denies BM. Diet 

advanced by medicine team.





PHYSICAL EXAM: 


VITAL SIGNS: Reviewed.


GENERAL: Well-developed in no acute distress. 


HEENT:  No sclera icterus. Extraocular movements grossly intact.  Moist buccal 

mucosa. Head is atraumatic, normocephalic. 


ABDOMEN:  Soft.  Nondistended. Nontender. 


NEUROLOGIC: Alert and oriented. Cranial nerves II through XII grossly intact.





ASSESSMENT: 


1.  Abdominal pain, nausea, vomiting x 1 day


2.  Partial small bowel obstruction


3.  History of multiple small bowel obstructions, most recent February 2019, all

resolved with conservative management


4.  History of EGD on 03/28/2019 revealing mild gastritis and small hiatal 

hernia





PLAN: 


Diet advanced per medicine team


Await bowel movement prior to discharge





Nurse practitioner note has been reviewed by physician. Signing provider agrees 

with the documented findings, assessment, and plan of care. 











Objective





- Vital Signs


Vital signs: 


                                   Vital Signs











Temp  97.6 F   04/05/19 13:13


 


Pulse  74   04/05/19 13:13


 


Resp  18   04/05/19 13:13


 


BP  142/75   04/05/19 13:13


 


Pulse Ox  100   04/05/19 13:13








                                 Intake & Output











 04/04/19 04/05/19 04/05/19





 18:59 06:59 18:59


 


Intake Total 240 800 


 


Balance 240 800 


 


Intake:   


 


  Intake, IV Titration  800 





  Amount   


 


    D5-0.45% NaCl with KCl  800 





    20Meq/l 1,000 ml @ 100   





    mls/hr IV .Q10H [facility] Rx#:   





    [id number]   


 


  Oral 240  


 


Other:   


 


  Voiding Method  Bedside Commode 





  Diaper 


 


  # Voids 2  














- Labs


CBC & Chem 7: 


                                 04/04/19 06:06





                                 04/04/19 06:06


Labs: 


                  Abnormal Lab Results - Last 24 Hours (Table)











  04/04/19 04/04/19 Range/Units





  16:49 21:00 


 


POC Glucose (mg/dL)  109 H  141 H  (75-99)  mg/dL
Subjective


Progress Note Date: 04/05/19





This is a 79-year-old  female in Dr. Reynoso with a previous medical

history significant for coronary artery disease status post PCI and stent 

placement of the obtuse marginal branch 1 back in June 2018, hypertension and 

hypertensive cardio vascular disease with left ventricular hypertrophy, h

yperlipidemia, history of uterine cancer back in 2010, legal blindness due to 

glaucoma, mitral valve prolapse, history of DVT, patient stated that she was 

doing fine up until 3:00 in the afternoon after she had a negative a sandwich 

and the she developed to have increase abdominal pain with abdominal cramping 

associated with increased nausea with increased dry heaving, she went to the 

bathroom sadness toilet and she felt that she is going to pass out she called 

her  and her  called EMS who brought her to the ER for evaluation 

she was found to have an accelerated hypertension she did receive labetalol 20 

mg and hydralazine 20 mg and her blood pressure got better patient had a co

mputed tomography scan of the abdomen pelvis with contrast that showed partial 

small bowel obstruction, she was admitted to the hospital under my service and 

general surgery consultation was obtained from Dr. Batres initially the 

patient refused to have an NG tube, she did not vomit more than once in the 

floor and once in the ER and eventually she was admitted to the floor and she 

was kept on nothing per mouth, I came and evaluated the patient and she has not 

had any abdominal pain no nausea no vomiting she continues to be nothing by 

mouth, she is asking for clear liquid diet, she has no IV fluid as well we will 

restart her IV fluid as well as her medication as the patient did have a bowel 

movement with surgical consultation.





4/4: Patient is currently tolerating a full liquid diet.  Patient has been seen 

and followed by Dr. Sam of and he recommends continuing full liquids today.  

Patient states her abdomen is less sore today.  She is passing gas but no bowel 

movement.  Anticipate she'll probably be ready for discharge tomorrow.  Patient 

has been afebrile, heart rate in the 50s to 70s, blood pressure 145/65, pulse ox

100% on room air.  WBC 4.3, hemoglobin 10.7, platelet 220.  Sodium 141, 

potassium 3.8, chloride 111, CO2 27, BUN 7 and creatinine 0.54.





4/5:patient states she ate this morning and is passing gas but has not had a 

bowel movement.  Her abdomen is soft.  She states she feels like she is near 

having a bowel movement does not want to have a suppository.  She states she is 

ambulated in the hallway this morning.  She is currently tolerating full liquid 

diet.  She has been afebrile, heart rate in the 60s, blood pressure 153/73 and 

pulse ox 100% on room air.  Patient does state that she is not drinking very 

much so we will continue IV fluids for now.  Anticipate she will be ready for 

discharge by tomorrow.





Review of Systems


Constitutional: Denies anorexia, Denies chronic headaches, Denies lethargy, 

Denies malaise, Denies weakness


Eyes: bilateral blurred vision


Ears: deny: decreased hearing


Ears, nose, mouth and throat: Denies dysphagia, Denies neck lump, Denies sore 

throat


Cardiovascular: Reports decreased exercise tolerance, Reports dyspnea on 

exertion, Reports shortness of breath, Reports syncope, Denies chest pain, 

Denies leg edema, Denies lightheadedness, Denies rapid heart beat


Respiratory: Denies congestion, Denies cough, Denies cough with sputum, Denies 

home oxygen, Denies sleep apnea, Denies snoring, Denies wheezing


Gastrointestinal: Denies abdominal pain, Reports belching, denies bloating, 

Reports loss of appetite, denies nausea, denies vomiting, Denies melena


Genitourinary: Denies dysuria, Denies hematuria


Menstruation: Reports postmenopausal


Musculoskeletal: Denies myalgias


Integumentary: Denies pruritus, Denies rash


Neurological: Denies numbness, Denies weakness


Psychiatric: Denies anxiety, Denies depression


Endocrine: Denies fatigue, Denies weight change








Objective





- Vital Signs


Vital signs: 


                                   Vital Signs











Temp  98.4 F   04/05/19 13:52


 


Pulse  72   04/05/19 13:52


 


Resp  18   04/05/19 13:13


 


BP  156/94   04/05/19 13:52


 


Pulse Ox  100   04/05/19 13:52








                                 Intake & Output











 04/04/19 04/05/19 04/05/19





 18:59 06:59 18:59


 


Intake Total 240 800 


 


Balance 240 800 


 


Intake:   


 


  Intake, IV Titration  800 





  Amount   


 


    D5-0.45% NaCl with KCl  800 





    20Meq/l 1,000 ml @ 100   





    mls/hr IV .Q10H [facility] Rx#:   





    [id number]   


 


  Oral 240  


 


Other:   


 


  Voiding Method  Bedside Commode 





  Diaper 


 


  # Voids 2  














- Exam





General appearance: no acute distress, thin.





- EENT


Eyes: anicteric sclerae, EOMI, PERRLA, no ptosis, no scleral icterus


ENT: hearing grossly normal, NA/AT, normal oropharynx, no thrush


Ears: bilateral: normal





- Neck


Neck: no lymphadenopathy, normal ROM, no rigidity, no stridor, no thyromegaly


Carotids: bilateral: upstroke normal


Thyroid: bilateral: normal size





- Respiratory


Respiratory: bilateral: diminished, negative: dullness, rales, rhonchi, 

wheezing, prolonged expiration





- Cardiovascular


Rhythm: regular


Heart sounds: normal: S1, S2


Abnormal Heart Sounds: systolic murmur, no S3 Gallop, no S4 Gallop





- Gastrointestinal


General gastrointestinal: normal bowel sounds, soft, no splenomegaly, no 

tenderness, no umbilical hernia, no ventral hernia





- Integumentary


Integumentary: normal, normal turgor





- Neurologic


Neurologic: CNII-XII intact





- Musculoskeletal


Musculoskeletal: gait normal, strength equal bilaterally





- Psychiatric


Psychiatric: A&O x's 3, appropriate affect, intact judgment & insight








- Labs


CBC & Chem 7: 


                                 04/04/19 06:06





                                 04/04/19 06:06


Labs: 


                  Abnormal Lab Results - Last 24 Hours (Table)











  04/04/19 04/04/19 Range/Units





  16:49 21:00 


 


POC Glucose (mg/dL)  109 H  141 H  (75-99)  mg/dL














Assessment and Plan


Plan: 





1.  Partial small bowel obstruction.  Continue full liquid diet, continue D5 

half-normal saline with 20 KCl at 75 mL an hour, surgical consultation 

appreciated.





2.  CAD post-PCI of the OM1.  Continue patient on aspirin 81 mg once every day, 

Plavix 75 mg orally once every day, metoprolol 25 mg orally twice every day, 

Lipitor 20 mg orally once every day and Imdur 30 mg orally once every day.





3.  Urgent hypertension with hypertension and hypertensive cardiovascular 

disease.  Patient did receive labetalol and hydralazine we will resume 

metoprolol 25 mg orally twice every day and lisinopril 2.5 mg orally once every 

day.





4.  Syncopal episode most likely vasovagal.  Monitor the patient very closely on

the telemetry unit to rule out any arrhythmias.





5.  Hyperlipidemia.  Continue patient on Lipitor 20 mg orally once every day.





6.  History of DVT in the past.  Resolved.





7.  History of glaucoma.  Continue with current eyedrops.





8.  History of endometrial and cervical cancer post radiation.





9.  DVT prophylaxis.  Heparin 5000 units subcutaneously every 8 hours.





10.  GI prophylaxis.  Continue PPI.





11.  Patient is full code.





Discharge plan: Most likely home Saturday.





Impression and plan of care have been directed as dictated by the signing 

physician.  Yazmin Boogie nurse practitioner acting as scribe for signing 

physician.
Subjective


Progress Note Date: 04/06/19








This is a 79-year-old  female in Dr. Reynoso with a previous medical

history significant for coronary artery disease status post PCI and stent 

placement of the obtuse marginal branch 1 back in June 2018, hypertension and 

hypertensive cardio vascular disease with left ventricular hypertrophy, 

hyperlipidemia, history of uterine cancer back in 2010, legal blindness due to 

glaucoma, mitral valve prolapse, history of DVT, patient stated that she was 

doing fine up until 3:00 in the afternoon after she had a negative a sandwich 

and the she developed to have increase abdominal pain with abdominal cramping 

associated with increased nausea with increased dry heaving, she went to the 

bathroom sadness toilet and she felt that she is going to pass out she called 

her  and her  called EMS who brought her to the ER for evaluation 

she was found to have an accelerated hypertension she did receive labetalol 20 

mg and hydralazine 20 mg and her blood pressure got better patient had a c

omputed tomography scan of the abdomen pelvis with contrast that showed partial 

small bowel obstruction, she was admitted to the hospital under my service and 

general surgery consultation was obtained from Dr. Batres initially the 

patient refused to have an NG tube, she did not vomit more than once in the 

floor and once in the ER and eventually she was admitted to the floor and she 

was kept on nothing per mouth, I came and evaluated the patient and she has not 

had any abdominal pain no nausea no vomiting she continues to be nothing by 

mouth, she is asking for clear liquid diet, she has no IV fluid as well we will 

restart her IV fluid as well as her medication as the patient did have a bowel 

movement with surgical consultation.





4/4: Patient is currently tolerating a full liquid diet.  Patient has been seen 

and followed by Dr. Sam of and he recommends continuing full liquids today.  

Patient states her abdomen is less sore today.  She is passing gas but no bowel 

movement.  Anticipate she'll probably be ready for discharge tomorrow.  Patient 

has been afebrile, heart rate in the 50s to 70s, blood pressure 145/65, pulse ox

100% on room air.  WBC 4.3, hemoglobin 10.7, platelet 220.  Sodium 141, 

potassium 3.8, chloride 111, CO2 27, BUN 7 and creatinine 0.54.





4/5:patient states she ate this morning and is passing gas but has not had a 

bowel movement.  Her abdomen is soft.  She states she feels like she is near 

having a bowel movement does not want to have a suppository.  She states she is 

ambulated in the hallway this morning.  She is currently tolerating full liquid 

diet.  She has been afebrile, heart rate in the 60s, blood pressure 153/73 and 

pulse ox 100% on room air.  Patient does state that she is not drinking very 

much so we will continue IV fluids for now.  Anticipate she will be ready for 

discharge by tomorrow.





4/6: Patient has no bowel movement, has attempted to going to bathroom, still no

bowel movement, no nausea, last bowel movement was 4days agogeneral surgery once

to keep her another day, fromlack of bowel movement,patient had sandwich today, 

no vomiting patient denies any melena hematochezia, and no chest pain or 

palpitations.no new labs for review, obtain one the morning








Objective





- Vital Signs


Vital signs: 


                                   Vital Signs











Temp  98.7 F   04/06/19 13:00


 


Pulse  90   04/06/19 13:00


 


Resp  16   04/06/19 16:00


 


BP  147/72   04/06/19 13:00


 


Pulse Ox  99   04/06/19 13:00








                                 Intake & Output











 04/05/19 04/06/19 04/06/19





 18:59 06:59 18:59


 


Intake Total 600  


 


Balance 600  


 


Weight  53 kg 


 


Intake:   


 


  Oral 600  


 


Other:   


 


  Voiding Method Toilet Toilet Toilet


 


  # Voids 5 2 4


 


  # Bowel Movements  0 0














- Constitutional


General appearance: Present: average body habitus, cooperative, no acute 

distress, thin





- EENT


Eyes: Present: anicteric sclerae, EOMI, PERRLA, dentition normal, normal 

appearance


ENT: Present: hard of hearing, NA/AT





- Neck


Neck: Present: normal ROM





- Respiratory


Respiratory: bilateral: CTA, negative: diminished, dullness, rales





- Cardiovascular


Rhythm: regular


Heart sounds: normal: S1, S2





- Gastrointestinal


General gastrointestinal: Present: normal bowel sounds, soft





- Integumentary


Integumentary: Present: decreased turgor, normal





- Neurologic


Neurologic: Present: CNII-XII intact





- Musculoskeletal


Musculoskeletal: Present: gait normal, strength equal bilaterally





- Psychiatric


Psychiatric: Present: A&O x's 3, appropriate affect, intact judgment & insight





- Labs


CBC & Chem 7: 


                                 04/04/19 06:06





                                 04/04/19 06:06





Assessment and Plan


Plan: 








1.  Partial small bowel obstruction.  Continue fsoftdiet, continue D5 half-

normal saline with 20 KCl at 75 mL an hour, surgical consultation appreciated.no

surgery required during this admission conservatively with medical management





2.  CAD post-PCI of the OM1.  Continue patient on aspirin 81 mg once every day, 

Plavix 75 mg orally once every day, metoprolol 25 mg orally twice every day, 

Lipitor 20 mg orally once every day and Imdur 30 mg orally once every day.





3.  Urgent hypertension with hypertension and hypertensive cardiovascular 

disease.  Patient did receive labetalol and hydralazine we will resume 

metoprolol 25 mg orally twice every day and lisinopril 2.5 mg orally once every 

day.





4.  Syncopal episode most likely vasovagal.  Monitor the patient very closely on

the telemetry unit to rule out any arrhythmias.





5.  Hyperlipidemia.  Continue patient on Lipitor 20 mg orally once every day.





6.  History of DVT in the past.  Resolved.





7.  History of glaucoma.  Continue with current eyedrops.





8.  History of endometrial and cervical cancer post radiation.





9.  DVT prophylaxis.  Heparin 5000 units subcutaneously every 8 hours.





10.  GI prophylaxis.  Continue PPI.





11.  Patient is full code.





Discharge plan:  1-2 days awaiting bowel movement
regular rate and rhythm/no murmur/no rub

## 2019-04-28 ENCOUNTER — HOSPITAL ENCOUNTER (EMERGENCY)
Dept: HOSPITAL 47 - EC | Age: 79
Discharge: HOME | End: 2019-04-28
Payer: MEDICARE

## 2019-04-28 VITALS — RESPIRATION RATE: 77 BRPM | DIASTOLIC BLOOD PRESSURE: 77 MMHG | SYSTOLIC BLOOD PRESSURE: 170 MMHG

## 2019-04-28 VITALS — TEMPERATURE: 98.7 F

## 2019-04-28 VITALS — HEART RATE: 77 BPM

## 2019-04-28 DIAGNOSIS — I10: Primary | ICD-10-CM

## 2019-04-28 DIAGNOSIS — I25.10: ICD-10-CM

## 2019-04-28 DIAGNOSIS — G62.9: ICD-10-CM

## 2019-04-28 DIAGNOSIS — Z90.710: ICD-10-CM

## 2019-04-28 DIAGNOSIS — I25.2: ICD-10-CM

## 2019-04-28 DIAGNOSIS — Z92.3: ICD-10-CM

## 2019-04-28 DIAGNOSIS — H54.8: ICD-10-CM

## 2019-04-28 DIAGNOSIS — Z82.49: ICD-10-CM

## 2019-04-28 DIAGNOSIS — K21.9: ICD-10-CM

## 2019-04-28 DIAGNOSIS — Z79.899: ICD-10-CM

## 2019-04-28 DIAGNOSIS — Z95.5: ICD-10-CM

## 2019-04-28 DIAGNOSIS — Z85.41: ICD-10-CM

## 2019-04-28 DIAGNOSIS — Z88.6: ICD-10-CM

## 2019-04-28 PROCEDURE — 96374 THER/PROPH/DIAG INJ IV PUSH: CPT

## 2019-04-28 PROCEDURE — 99283 EMERGENCY DEPT VISIT LOW MDM: CPT

## 2019-04-28 NOTE — ED
General Adult HPI





- General


Chief complaint: Recheck/Abnormal Lab/Rx


Stated complaint: blood pressure


Time Seen by Provider: 19 21:04


Source: patient


Mode of arrival: wheelchair


Limitations: physical limitation





- History of Present Illness


Initial comments: 





Patient is a 79-year-old female presenting for hypertension.  The patient states

that she has had a lot of management of her blood pressure medications by her 

cardiologist as well as her family doctor and was recently taken off lisinopril.

 She presents today because her blood pressure at home was in the low 200s.  She

takes her blood pressure every hour and decided come in because it was 

significantly high.  However, she denies any headaches, chest pain, shortness of

breath, abdominal pain, nausea/vomiting/diarrhea and states that she is 

essentially completely asymptomatic.





- Related Data


                                Home Medications











 Medication  Instructions  Recorded  Confirmed


 


Gabapentin [Neurontin] 300 mg PO HS 18


 


Brimonidine Tartrate [Alphagan P 1 drops BOTH EYES TID 18





0.2% Ophth Soln]   


 


Dorzolamide/Timolol/Pf [Cosopt Pf 1 applicator BOTH EYES TID 18





2%/5% Ophth Droperette]   


 


Furosemide [Lasix] 20 mg PO DAILY PRN 18


 


Docusate Sodium [Dok] 100 mg PO HS 18


 


Famotidine [Pepcid] 20 mg PO DAILY PRN 19


 


Isosorbide Mononitrate ER [Imdur] 30 mg PO DAILY 19


 


Lisinopril [Zestril] 2.5 mg PO DAILY@1300 19


 


Potassium Chloride [Klor-Con 20] 20 meq PO DAILY PRN 19


 


Calcium Carbonate/Vitamin D3 1 cap PO BID 19





[Calcium 600-Vit D3 500 Softgel]   








                                  Previous Rx's











 Medication  Instructions  Recorded


 


Aspirin EC [Ecotrin Low Dose] 81 mg PO DAILY #30 tablet. 18


 


Atorvastatin [Lipitor] 40 mg PO DAILY #30 tab 18


 


Clopidogrel [Plavix] 75 mg PO DAILY #30 tab 18


 


Metoprolol Tartrate [Lopressor] 25 mg PO BID #60 tab 18


 


Nitroglycerin Sl Tabs [Nitrostat] 0.4 mg SUBLINGUAL Q5M PRN #25 tab 18











                                    Allergies











Allergy/AdvReac Type Severity Reaction Status Date / Time


 


ibuprofen [From Advil] AdvReac  Nausea & Verified 19 21:21





   Vomiting  














Review of Systems


ROS Statement: 


Those systems with pertinent positive or pertinent negative responses have been 

documented in the HPI.


Constitutional: Negative for chills, fatigue and fever.





HENT: Negative for congestion. 





Respiratory: Negative for chest tightness, shortness of breath and wheezing. 

Negative for cough





Cardiovascular: Negative for chest pain and palpitations.





Gastrointestinal: Negative for abdominal pain. Negative for abdominal 

distention, diarrhea, nausea and vomiting.





Genitourinary: Negative for dysuria.





Musculoskeletal: Negative for back pain, neck pain and neck stiffness.





Skin: Negative for color change.





Neurological: Negative for dizziness, speech difficulty, weakness and light-

headedness.





Psychiatric/Behavioral: Negative for agitation and confusion. Negative for 

anxiety


ROS Other: All systems not noted in ROS Statement are negative.





Past Medical History


Past Medical History: Coronary Artery Disease (CAD), Cancer, Deep Vein 

Thrombosis (DVT), Eye Disorder, GERD/Reflux, Hyperlipidemia, Hypertension, 

Myocardial Infarction (MI), Osteoarthritis (OA)


Additional Past Medical History / Comment(s): Bowel obstruction 2018 while 

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 

cathreine legs, uterine cancer in , endometrial and cervical ca with radiation,


Last Myocardial Infarction Date:: 18


History of Any Multi-Drug Resistant Organisms: None Reported


Past Surgical History: Heart Catheterization With Stent, Hysterectomy


Additional Past Surgical History / Comment(s): eye surgery, breast biopsy


Past Anesthesia/Blood Transfusion Reactions: No Reported Reaction


Date of Last Stent Placement:: 


Past Psychological History: No Psychological Hx Reported


Smoking Status: Never smoker


Past Alcohol Use History: None Reported


Past Drug Use History: None Reported





- Past Family History


  ** Mother


Family Medical History: Cancer, Renal Disease


Additional Family Medical History / Comment(s): uterine CA





  ** Brother(s)


Family Medical History: Cancer


Additional Family Medical History / Comment(s): colon CA





  ** Sister(s)


Family Medical History: Cancer


Additional Family Medical History / Comment(s): colon and breast CA





  ** Father


Family Medical History: Myocardial Infarction (MI)


Additional Family Medical History / Comment(s):  at 54 from MI





General Exam





- General Exam Comments


Initial Comments: 





Constitutional: Pt appears well-developed and well-nourished. No distress.





Head: Normocephalic and atraumatic.





Eyes: EOM are normal.





Neck: Normal range of motion. Neck supple.





Cardiovascular: Normal rate, regular rhythm, S1 normal, S2 normal and normal 

heart sounds.  Exam reveals no gallop and no friction rub. No murmur heard.





Pulmonary/Chest: Effort normal and breath sounds normal. No tachypnea and no br

adypnea. No respiratory distress. No wheezes or rales noted.





Abdominal: Soft. Bowel sounds are normal. Pt exhibits no shifting dullness, no 

distension, no pulsatile liver, no fluid wave, no abdominal bruit and no 

ascites. There is no rigidity, no rebound, no guarding, no tenderness at 

McBurney's point and negative Cruz's sign. There is no tenderness. 





Musculoskeletal: Normal range of motion.





Neurological: Pt is alert and oriented to person, place, and time. No cranial 

nerve deficit.





Skin: Skin is warm and dry. No rash noted. Pt is not diaphoretic. No erythema. 

No pallor.





Psychiatric: Pt has a normal mood and affect. Pt behavior is normal. Thought 

content normal.


Limitations: physical limitation





Course


                                   Vital Signs











  19





  21:10 21:12 21:20


 


Temperature  98.7 F 


 


Pulse Rate  68 


 


Respiratory  18 





Rate   


 


Blood Pressure 224/108 224/108 224/108


 


O2 Sat by Pulse 84 L 93 L 100





Oximetry   














  19





  21:30 21:40 21:50


 


Temperature   


 


Pulse Rate  77 


 


Respiratory 16 16 77 H





Rate   


 


Blood Pressure 203/89 181/84 170/77


 


O2 Sat by Pulse 99 100 99





Oximetry   














Medical Decision Making





- Medical Decision Making





Patient had no evidence of an organ dysfunction such as chest pain or shortness 

breath and vital signs are within normal limits and therefore advanced 

laboratory studies or imaging was not completed.  Patient was given 10 mg 

hydralazine and blood pressure improved.  Patient was advised that she should 

follow up with PCP as this would be the appropriate person to manage the blood 

pressure medication.  Patient was agreeable plan as well as  who is 

bedside.  Patient was also advised to return to emergency department if the 

symptoms worsen or she started developing other concerning symptoms such as but 

not limited to chest pain or shortness breath.  Also please note that the 

respiratory rate is a room initially injured at 77 respiratory minute.  Res

piratory rate was less than 20





Disposition


Clinical Impression: 


 Hypertension





Disposition: HOME SELF-CARE


Condition: Good


Instructions (If sedation given, give patient instructions):  Hypertension (ED)


Is patient prescribed a controlled substance at d/c from ED?: No


Referrals: 


Ashwin Reynoso DO [Primary Care Provider] - 1-2 days


Time of Disposition: 21:56

## 2019-06-03 ENCOUNTER — HOSPITAL ENCOUNTER (OUTPATIENT)
Dept: HOSPITAL 47 - RADCTMAIN | Age: 79
Discharge: HOME | End: 2019-06-03
Attending: SURGERY
Payer: MEDICARE

## 2019-06-03 DIAGNOSIS — N28.9: ICD-10-CM

## 2019-06-03 DIAGNOSIS — K56.609: ICD-10-CM

## 2019-06-03 DIAGNOSIS — K76.0: Primary | ICD-10-CM

## 2019-06-03 DIAGNOSIS — R13.10: ICD-10-CM

## 2019-06-03 LAB — BUN SERPL-SCNC: 14 MG/DL (ref 7–17)

## 2019-06-03 PROCEDURE — 82565 ASSAY OF CREATININE: CPT

## 2019-06-03 PROCEDURE — 84520 ASSAY OF UREA NITROGEN: CPT

## 2019-06-03 PROCEDURE — 36415 COLL VENOUS BLD VENIPUNCTURE: CPT

## 2019-06-03 PROCEDURE — 74177 CT ABD & PELVIS W/CONTRAST: CPT

## 2019-06-03 NOTE — CT
EXAMINATION TYPE: CT abdomen pelvis w con

 

DATE OF EXAM: 6/3/2019

 

COMPARISON: 4/2/2019

 

HISTORY: Pain. History of small bowel obstruction. Difficulty swallowing.

 

CT DLP: 318.2 mGycm

Automated exposure control for dose reduction was used.

 

CONTRAST: 

CT scan of the abdomen pelvis is performed with IV Contrast, patient injected with 100 mL of Isovue 3
00.

 

FINDINGS- 

LUNG BASES-calcified granuloma along the medial aspect of the right lung base. Atherosclerotic change
 of the celiac axis and SMA

 

LIVER/GB-   Hepatic parenchyma is diffusely hypoattenuated in comparison to that of the spleen, most 
commonly seen in hepatic steatosis. This finding limits evaluation for hepatic masses. No gross evide
nce of hepatic mass is seen. No intrahepatic biliary ductal dilatation. No cholelithiasis. 

 

PANCREAS-  No gross abnormality is seen. 

 

SPLEEN-  No gross abnormality is seen. 

 

ADRENALS-  No gross abnormality is seen.

 

KIDNEYS-there is a nonobstructing right renal calculus measuring 3 mm. There is an extrarenal pelvis 
on the right.

   

BOWEL-contrast extends beyond the nondilated small bowel into the colon excluding small bowel obstruc
tion. Mild degree colonic fecal stasis limits evaluation of the colon. No dilated large or small faye
l is seen. Low-lying loops of small bowel are present behind the urinary bladder in the right lower q
uadrant. Too small to accurately characterize hypoattenuated bilateral subcentimeter renal lesions ar
e seen.. 

  

LYMPH NODES-  No greater than 1cm abdominal or pelvic lymph nodes are appreciated. 

 

OSSEOUS STRUCTURES-scoliosis with multilevel degenerative disc disease and facet arthropathy.

 

OTHER-  surgical clips are seen in the pelvis. Bladder is distended and slightly low-lying in positio
n. Trace amount of free fluid in the pelvis. 

 

IMPRESSION- 

1. No evidence of bowel obstruction.

2. Distal esophagus is nondistended and cannot be fully evaluated in this patient with dysphagia. End
oscopy could be considered if there is further clinical concern.

3. Incidentally noted mild degree hepatic steatosis, too small to accurately characterize bilateral r
enal lesions, scant amount of free fluid within the pelvis, scoliosis, postsurgical change of the pel
vis, extensive atherosclerosis of the abdominal aorta without stenosis at the celiac and SMA and nono
bstructing right renal calculus.

## 2019-06-04 ENCOUNTER — HOSPITAL ENCOUNTER (OUTPATIENT)
Dept: HOSPITAL 47 - RADMAMWWP | Age: 79
Discharge: HOME | End: 2019-06-04
Attending: FAMILY MEDICINE
Payer: MEDICARE

## 2019-06-04 DIAGNOSIS — Z12.31: Primary | ICD-10-CM

## 2019-06-04 PROCEDURE — 77067 SCR MAMMO BI INCL CAD: CPT

## 2019-06-04 PROCEDURE — 77063 BREAST TOMOSYNTHESIS BI: CPT

## 2019-06-05 NOTE — MM
Reason for exam: screening  (asymptomatic).

Last mammogram was performed 1 year and 4 months ago.



History:

Patient is postmenopausal and has history of endometrial cancer at age 70.

Family history of breast cancer in sister at age 78.

Benign US biopsy breast VAD LT of the left breast, April 15, 2015.  Benign core 

biopsy of the left breast, 1990.  Benign core biopsy of the right breast, 1980.



Physical Findings:

A clinical breast exam by your physician is recommended on an annual basis and 

results should be correlated with mammographic findings.



MG 3D Screening Mammo W/Cad

Bilateral CC and MLO view(s) were taken.

Prior study comparison: February 15, 2018, bilateral MG 3d screening mammo w/cad. 

January 20, 2017, bilateral MG 3d screening mammo w/cad.

The breast tissue is heterogeneously dense. This may lower the sensitivity of 

mammography.  There are benign appearing round dystrophic calcifications. Previous

mammotome biopsy in the left breast. There is chronic nodularity bilaterally. 

There is no discrete abnormality.





ASSESSMENT: Benign, BI-RAD 2



RECOMMENDATION:

Routine screening mammogram of both breasts in 1 year.

## 2019-06-20 ENCOUNTER — HOSPITAL ENCOUNTER (OUTPATIENT)
Dept: HOSPITAL 47 - LABPAT | Age: 79
Discharge: HOME | End: 2019-06-20
Attending: INTERNAL MEDICINE
Payer: MEDICARE

## 2019-06-20 DIAGNOSIS — I25.10: ICD-10-CM

## 2019-06-20 DIAGNOSIS — Z01.812: Primary | ICD-10-CM

## 2019-06-20 LAB
ANION GAP SERPL CALC-SCNC: 5 MMOL/L
BUN SERPL-SCNC: 15 MG/DL (ref 7–17)
CHLORIDE SERPL-SCNC: 109 MMOL/L (ref 98–107)
CO2 SERPL-SCNC: 30 MMOL/L (ref 22–30)
ERYTHROCYTE [DISTWIDTH] IN BLOOD BY AUTOMATED COUNT: 4.17 M/UL (ref 3.8–5.4)
ERYTHROCYTE [DISTWIDTH] IN BLOOD: 14 % (ref 11.5–15.5)
HCT VFR BLD AUTO: 39.5 % (ref 34–46)
HGB BLD-MCNC: 12.2 GM/DL (ref 11.4–16)
MCH RBC QN AUTO: 29.2 PG (ref 25–35)
MCHC RBC AUTO-ENTMCNC: 30.8 G/DL (ref 31–37)
MCV RBC AUTO: 94.8 FL (ref 80–100)
PLATELET # BLD AUTO: 234 K/UL (ref 150–450)
POTASSIUM SERPL-SCNC: 4.7 MMOL/L (ref 3.5–5.1)
SODIUM SERPL-SCNC: 144 MMOL/L (ref 137–145)
WBC # BLD AUTO: 5.8 K/UL (ref 3.8–10.6)

## 2019-06-20 PROCEDURE — 84520 ASSAY OF UREA NITROGEN: CPT

## 2019-06-20 PROCEDURE — 36415 COLL VENOUS BLD VENIPUNCTURE: CPT

## 2019-06-20 PROCEDURE — 80051 ELECTROLYTE PANEL: CPT

## 2019-06-20 PROCEDURE — 85027 COMPLETE CBC AUTOMATED: CPT

## 2019-06-20 PROCEDURE — 82565 ASSAY OF CREATININE: CPT

## 2019-06-24 ENCOUNTER — HOSPITAL ENCOUNTER (OUTPATIENT)
Dept: HOSPITAL 47 - CATHCVL | Age: 79
LOS: 1 days | Discharge: HOME | End: 2019-06-25
Attending: INTERNAL MEDICINE
Payer: MEDICARE

## 2019-06-24 VITALS — RESPIRATION RATE: 18 BRPM

## 2019-06-24 VITALS — BODY MASS INDEX: 19.3 KG/M2

## 2019-06-24 DIAGNOSIS — E78.2: ICD-10-CM

## 2019-06-24 DIAGNOSIS — I25.119: Primary | ICD-10-CM

## 2019-06-24 DIAGNOSIS — Z79.899: ICD-10-CM

## 2019-06-24 DIAGNOSIS — Z82.49: ICD-10-CM

## 2019-06-24 DIAGNOSIS — I10: ICD-10-CM

## 2019-06-24 DIAGNOSIS — Z79.02: ICD-10-CM

## 2019-06-24 DIAGNOSIS — I25.84: ICD-10-CM

## 2019-06-24 DIAGNOSIS — Z79.82: ICD-10-CM

## 2019-06-24 DIAGNOSIS — I34.0: ICD-10-CM

## 2019-06-24 PROCEDURE — 80048 BASIC METABOLIC PNL TOTAL CA: CPT

## 2019-06-24 PROCEDURE — 93458 L HRT ARTERY/VENTRICLE ANGIO: CPT

## 2019-06-24 PROCEDURE — 85347 COAGULATION TIME ACTIVATED: CPT

## 2019-06-24 RX ADMIN — METOPROLOL TARTRATE SCH: 25 TABLET, FILM COATED ORAL at 20:35

## 2019-06-24 RX ADMIN — DORZOLAMIDE HYDROCHLORIDE SCH DROPS: 20 SOLUTION/ DROPS OPHTHALMIC at 17:55

## 2019-06-24 RX ADMIN — Medication SCH: at 20:35

## 2019-06-24 RX ADMIN — TIMOLOL MALEATE SCH DROPS: 5 SOLUTION OPHTHALMIC at 17:55

## 2019-06-24 RX ADMIN — DORZOLAMIDE HYDROCHLORIDE SCH DROPS: 20 SOLUTION/ DROPS OPHTHALMIC at 20:37

## 2019-06-24 RX ADMIN — BRIMONIDINE TARTRATE SCH: 2 SOLUTION/ DROPS OPHTHALMIC at 18:25

## 2019-06-24 RX ADMIN — TIMOLOL MALEATE SCH DROPS: 5 SOLUTION OPHTHALMIC at 20:37

## 2019-06-24 RX ADMIN — BRIMONIDINE TARTRATE SCH DROPS: 2 SOLUTION/ DROPS OPHTHALMIC at 20:37

## 2019-06-24 NOTE — PTCA
PERCUTANEOUSTRANS CORORONARY ANGIOGRAPHY



Mrs. Sullivan is a 79-year-old female with a known history of hypertension,

hyperlipidemia, and history of coronary artery disease, status post stenting of the

diagonal branch in June of 2018, who presented with recent onset angina pectoris,

underwent cardiac catheterization, was found to have significant obstructive disease

and a focal area after the takeoff of the diagonal branch in the mid LAD.  In view of

that, recommendation made regarding angioplasty and stenting, the procedures, risks,

and complications were discussed with the patient who is in full understanding and

agreement.



PROCEDURE:

A 6-Russian FL 3.5 guiding catheter introduced into the system, after cannulating the

left main, a 0.014 balanced medium weight J-wire was advanced and positioned distal

diagonal branch.  Subsequently, another 0.014 balanced medium weight J-wire was

advanced and positioned distal LAD.  Following that, a 2.5 x 12 mm Trek balloon was

advanced and one inflation at 8 atmospheres was done.  Following that, the balloon was

removed and a 2.75 x 15 mm Xience Nicky stent was deployed and postdilated at 16

atmospheres.  After the last inflation, after appropriate wait, the balloon and the

guidewire were withdrawn back in the guiding catheter.  Images were obtained and

repeated.  Those images reveal stable successful stenting.  At that point, the guiding

catheter, the balloon and the guidewire were removed, images were obtained and

repeated.  Following that, the guiding catheter were removed and a left ventriculogram

was performed.  Following that, catheter and sheath were removed.  Hemostasis was

obtained with deployment of a TR band.  There was no immediate complication.  Patient

is returned to her room in stable condition.  Of note, the patient received Angiomax

per protocol and was continued on clopidogrel.  She had no chest discomfort, but she

had EKG changes with the inflation that resolved at the end of the procedure.



RESULTS:

Successful stenting of the mid left anterior descending artery with reduction of

stenosis from 95% to 0%.



RECOMMENDATION:

Patient will be continued on aspirin, Plavix and statin.  The importance of dual

antiplatelet treatment were discussed with the patient and her family, who are in full

understanding and agreement.



Duration of procedure 43 minutes.





MMODL / BIMALN: [id number] / Job#: [id number]

## 2019-06-24 NOTE — LTR
DATE OF SERVICE:  06/24/2019





RE:  Kiki Sullivan





Dear Dr. Reynoso;





I had the pleasure to perform cardiac catheterization and coronary angioplasty and

stenting on Mrs. Sullivan at [facility] on June 24, 2019 and a full copy of the

procedure note will be forwarded to you.



In brief, she underwent successful stenting of her mid LAD.  I am hopeful that this

procedure will stabilize her status and thank you again for allowing me to participate

in this patient's care.



Please feel free to call for any questions.





Sincerely yours,





Miguel Tobin MD





MMUBALDOL / BIMALN: [id number] / Job#: [id number] Date:                           10/18/2017  Patient name:           Angelica Ho  Date of admission:  No admission date for patient encounter. MRN:   [id number]  YOB: 1937  PCP:                           Erin Fields MD          Subjective:       Patient presents to the clinic for a follow-up visit and to discuss results of her blood workup. Since her last office visit which was in October 2016 patient denies any episodes of venous thromboembolism. She had a mammogram done in November 2016 which was unremarkable. Patient has lost weight intentionally with dietary modification. Denies fever chills night sweats or noticing any lumps or bumps anywhere on the body. Denies abdominal pain. She does complain of pain over the neck area and left shoulder      REVIEW OF SYSTEMS:  General: no fever or night sweats, Weight is stable. ENT: No double or blurred vision, no hearing problem, no dysphagia or sore throat   Respiratory: No chest pain, no shortness of breath, no cough or hemoptysis. Cardiovascular: Denies chest pain, PND or orthopnea. No L E swelling or palpitations. Gastrointestinal:    No nausea or vomiting, abdominal pain, diarrhea or constipation. Genitourinary: Denies dysuria, hematuria, frequency, urgency or incontinence. Neurological: Denies headaches, decreased LOC, no sensory or motor focal deficits. Musculoskeletal:  Pain over left shoulder and neck  Skin: There are no rashes or bleeding.   Psych: Denies hallucinations or intentions to harm self        Objective:     Vitals: /66 (Site: Right Arm, Position: Sitting, Cuff Size: Medium Adult)   Pulse 54   Temp 98.2 °F (36.8 °C) (Oral)   Resp 16   Wt 152 lb 11.2 oz (69.3 kg)   BMI 26.21 kg/m²   General appearance - well appearing, no in pain or distress  Mental status - AAO X3  Eyes - pupils equal and reactive, extraocular eye movements intact  Mouth - mucous membranes moist, pharynx normal without lesions  Neck - supple, no significant adenopathy  Lymphatics - no palpable lymphadenopathy, no hepatosplenomegaly  Chest - clear to auscultation, no wheezes, rales or rhonchi, symmetric air entry  Heart - normal rate, regular rhythm, normal S1, S2, no murmurs  Abdomen - soft, nontender, nondistended, no masses or organomegaly  Neurological - alert, oriented, normal speech, no focal findings or movement disorder noted  Extremities - peripheral pulses normal, no pedal edema, no clubbing or cyanosis  Skin - normal coloration and turgor, no rashes, no suspicious skin lesions noted   Breastexamination was performed in the presence of a female nurse. Examination of the left side of the chest did not reveal any findings concerning for cancer recurrence. Evaluation of right breast did not reveal any lumps. There was no bilateral axillary lymphadenopathy       Data:    CBC:   Recent Labs      10/16/17   1730   WBC  5.5  5.5   HGB  14.9  14.9   PLT  247  247     BMP:    Recent Labs      10/16/17   1730   NA  141  141   K  4.2  4.2   CL  102  102   CO2  26  26   BUN  22  22   CREATININE  0.70  0.70   GLUCOSE  76  76     Hepatic:   Recent Labs      10/16/17   1730   AST  20  20   ALT  15  15   BILITOT  0.38  0.38   ALKPHOS  71  71     Impression   No mammographic evidence of malignancy.       BIRADS:   BIRADS - CATEGORY 1       Negative, no evidence of malignancy.  Normal interval follow-up is   recommended in 12 months.       OVERALL ASSESSMENT - NEGATIVE       A letter of notification will be sent to the patient regarding the results.       The [address] [place] [address] of Radiology recommends annual mammograms for women 40   years and older. PMH:  Past Medical History:   Diagnosis Date    Anxiety     GERD (gastroesophageal reflux disease)     History of left breast cancer 2005    Hx of blood clots     DVT, PE    Hypertension     Osteoarthritis       Allergies:    Allergies   Allergen

## 2019-06-24 NOTE — CC
CARDIAC CATHETERIZATION REPORT



Mrs. Sullivan is a 79-year-old female with known history of hypertension,

hyperlipidemia, and history of coronary artery disease who underwent stenting of the

first diagonal branch in June of 2018 who recently presented with symptoms of chest

discomfort reminding her of the way she felt prior to her intervention. In view of

that, recommendation was made regarding cardiac catheterization. The procedure as well

as the risks and the complications were discussed with the patient who is in full

understanding and agreement.



PROCEDURE:

Patient was brought to the cath lab in a fasting semi-sedated state after receiving

fentanyl and Benadryl and achieving moderate conscious sedated state.  Using Xylocaine

anesthesia in the Seldinger technique, a 6-Turkish sheath was introduced in the right

radial artery.  Selective right and left coronary angiography was performed using 5-

Turkish 3.5 bend right and left Hudson catheter.  Multiple views of the coronary artery

including hemiaxial views were obtained.  Following that, an  angioplasty and stenting

was performed following that 5-Turkish tight pigtail catheter was introduced in the left

ventricle and a 30-degree CASTELLAONS view of the left ventricle was obtained.  Following that,

catheter and sheath were removed.  Hemostasis was obtained with deployment of a TR

band.  There was no immediate complication. Patient was returned to her room in stable

condition.



FINDINGS:

FLUOROSCOPY: There was severe calcification involving all the coronary arteries.



LEFT MAIN: This is a large-sized vessel bifurcating left circumflex, left anterior

descending artery. Left main coronary artery has no evidence of high-grade stenosis.



LEFT ANTERIOR DESCENDING ARTERY: This is a large-sized vessel reaching toward the apex

with a wrap around the apex segment giving rise to a large diagonal branch. Proximally

the diagonal branch stented segment is patent with no significant in-stent restenosis.

After the takeoff of the first diagonal branch, there is an  eccentric focal 95%

stenosis in the LAD.  The rest of the vessel has mild intimal disease without any

evidence of high-grade stenosis.



LEFT CIRCUMFLEX: This is a nondominant vessel giving rise to an obtuse marginal branch

of large caliber, calcified, has mild intimal disease proximal 10% to 20% without any

evidence of high-grade stenosis.



RIGHT CORONARY ARTERY: This is a large dominant vessel bifurcating distally PDA and

posterolateral segment and branches. The right coronary artery in mid segment has

diffuse intimal disease with area stenosis up to 40% to 50%  The rest of the vessel has

no high-grade stenosis.



LEFT VENTRICULOGRAM:  Left ventriculogram is performed in 30-degree CASTELLANOS view and

revealed normal left ventricular size and systolic function. Ejection fraction is 60%.

There was no significant mitral regurgitation.



HEMODYNAMICS:  There was no gradient across the aortic valve. The left ventricular end-

diastolic pressure was 14 to 16 mmHg.



CONCLUSION:

1. Critical stenosis in the mid left anterior descending artery after the takeoff of

    the first diagonal branch.

2. Patent stent of the diagonal branch.

3. Mild to moderate disease in the RCA.

4. Calcified coronary arteries.

5. Normal left ventricular size and systolic function.



RECOMMENDATION:

In view of finding in the anatomy, I recommend proceeding with angioplasty and stenting

of the LAD. The procedure as well as the risks and complications were discussed with

the patient who is in full understanding and agreement.





MMKAMALA / BIMALN: [id number] / Job#: [id number]

## 2019-06-25 VITALS — TEMPERATURE: 98.1 F | DIASTOLIC BLOOD PRESSURE: 77 MMHG | SYSTOLIC BLOOD PRESSURE: 148 MMHG | HEART RATE: 87 BPM

## 2019-06-25 LAB
ANION GAP SERPL CALC-SCNC: 6 MMOL/L
BUN SERPL-SCNC: 14 MG/DL (ref 7–17)
CALCIUM SPEC-MCNC: 8.5 MG/DL (ref 8.4–10.2)
CHLORIDE SERPL-SCNC: 110 MMOL/L (ref 98–107)
CO2 SERPL-SCNC: 27 MMOL/L (ref 22–30)
GLUCOSE SERPL-MCNC: 83 MG/DL (ref 74–99)
POTASSIUM SERPL-SCNC: 3.7 MMOL/L (ref 3.5–5.1)
SODIUM SERPL-SCNC: 143 MMOL/L (ref 137–145)

## 2019-06-25 RX ADMIN — METOPROLOL TARTRATE SCH MG: 25 TABLET, FILM COATED ORAL at 09:24

## 2019-06-25 RX ADMIN — DORZOLAMIDE HYDROCHLORIDE SCH: 20 SOLUTION/ DROPS OPHTHALMIC at 09:25

## 2019-06-25 RX ADMIN — BRIMONIDINE TARTRATE SCH: 2 SOLUTION/ DROPS OPHTHALMIC at 09:25

## 2019-06-25 RX ADMIN — Medication SCH EACH: at 09:23

## 2019-06-25 RX ADMIN — TIMOLOL MALEATE SCH: 5 SOLUTION OPHTHALMIC at 09:25

## 2019-06-25 NOTE — PN
PROGRESS NOTE



Mrs. Sullivan is a 79-year-old female with a known history of coronary artery disease,

who presented with symptoms of chest discomfort, underwent cardiac catheterization, was

found to have significant stenosis in the mid LAD at the takeoff of the diagonal

branch, underwent stenting of that vessel yesterday.  She is doing well this morning,

ambulating without difficulty, denying any chest pain, dizziness or palpitation.



She continued be on aspirin 81 mg daily, Lipitor 40 mg daily, Plavix 75 mg daily,

isosorbide mononitrate 30 mg daily, metoprolol tartrate 25 mg twice a day.



PHYSICAL EXAMINATION:

Blood pressure running in the 118 to 140 with a heart rate in the 70s.

LUNGS:  Clear.

HEART:  Regular rate and rhythm, S1, S2.  No S3 with a systolic murmur in the apex.  No

diastolic murmur.  No rub.

ABDOMEN:  Soft, nontender.

EXTREMITIES:  No edema, right radial pulse intact.



LAB DATA:

Revealed potassium 3.7, BUN and creatinine 14 and 0.65.



EKG reveals sinus mechanism with no acute changes.



IMPRESSION:

1. Status post stenting of the left anterior descending artery.

2. Hypertension.

3. Hyperlipidemia.



RECOMMENDATION:

Patient should be able to be discharged home today and followed as an outpatient.





MMODL / IJN: [id number] / Job#: [id number]

## 2019-08-01 ENCOUNTER — HOSPITAL ENCOUNTER (OUTPATIENT)
Dept: HOSPITAL 47 - EC | Age: 79
Setting detail: OBSERVATION
LOS: 1 days | Discharge: HOME | End: 2019-08-02
Attending: FAMILY MEDICINE | Admitting: FAMILY MEDICINE
Payer: MEDICARE

## 2019-08-01 DIAGNOSIS — Z85.41: ICD-10-CM

## 2019-08-01 DIAGNOSIS — Z95.5: ICD-10-CM

## 2019-08-01 DIAGNOSIS — I25.110: Primary | ICD-10-CM

## 2019-08-01 DIAGNOSIS — Z79.82: ICD-10-CM

## 2019-08-01 DIAGNOSIS — Z79.899: ICD-10-CM

## 2019-08-01 DIAGNOSIS — H54.8: ICD-10-CM

## 2019-08-01 DIAGNOSIS — I16.0: ICD-10-CM

## 2019-08-01 DIAGNOSIS — I34.1: ICD-10-CM

## 2019-08-01 DIAGNOSIS — Z92.3: ICD-10-CM

## 2019-08-01 DIAGNOSIS — Z90.710: ICD-10-CM

## 2019-08-01 DIAGNOSIS — Z79.02: ICD-10-CM

## 2019-08-01 DIAGNOSIS — Z86.718: ICD-10-CM

## 2019-08-01 DIAGNOSIS — Z80.3: ICD-10-CM

## 2019-08-01 DIAGNOSIS — Z80.0: ICD-10-CM

## 2019-08-01 DIAGNOSIS — Z85.42: ICD-10-CM

## 2019-08-01 DIAGNOSIS — I11.9: ICD-10-CM

## 2019-08-01 DIAGNOSIS — I25.2: ICD-10-CM

## 2019-08-01 DIAGNOSIS — Z82.49: ICD-10-CM

## 2019-08-01 DIAGNOSIS — H40.9: ICD-10-CM

## 2019-08-01 DIAGNOSIS — Z80.49: ICD-10-CM

## 2019-08-01 DIAGNOSIS — E78.5: ICD-10-CM

## 2019-08-01 DIAGNOSIS — K21.9: ICD-10-CM

## 2019-08-01 LAB
ALBUMIN SERPL-MCNC: 3.6 G/DL (ref 3.5–5)
ALP SERPL-CCNC: 71 U/L (ref 38–126)
ALT SERPL-CCNC: 19 U/L (ref 9–52)
ANION GAP SERPL CALC-SCNC: 9 MMOL/L
APTT BLD: 18.9 SEC (ref 22–30)
AST SERPL-CCNC: 26 U/L (ref 14–36)
BASOPHILS # BLD AUTO: 0 K/UL (ref 0–0.2)
BASOPHILS NFR BLD AUTO: 1 %
BUN SERPL-SCNC: 17 MG/DL (ref 7–17)
CALCIUM SPEC-MCNC: 9.3 MG/DL (ref 8.4–10.2)
CHLORIDE SERPL-SCNC: 108 MMOL/L (ref 98–107)
CO2 SERPL-SCNC: 26 MMOL/L (ref 22–30)
EOSINOPHIL # BLD AUTO: 0.1 K/UL (ref 0–0.7)
EOSINOPHIL NFR BLD AUTO: 1 %
ERYTHROCYTE [DISTWIDTH] IN BLOOD BY AUTOMATED COUNT: 4.21 M/UL (ref 3.8–5.4)
ERYTHROCYTE [DISTWIDTH] IN BLOOD: 14.5 % (ref 11.5–15.5)
GLUCOSE SERPL-MCNC: 107 MG/DL (ref 74–99)
HCT VFR BLD AUTO: 39.7 % (ref 34–46)
HGB BLD-MCNC: 12.3 GM/DL (ref 11.4–16)
INR PPP: 0.9 (ref ?–1.2)
LYMPHOCYTES # SPEC AUTO: 0.7 K/UL (ref 1–4.8)
LYMPHOCYTES NFR SPEC AUTO: 16 %
MAGNESIUM SPEC-SCNC: 2 MG/DL (ref 1.6–2.3)
MCH RBC QN AUTO: 29.3 PG (ref 25–35)
MCHC RBC AUTO-ENTMCNC: 31.1 G/DL (ref 31–37)
MCV RBC AUTO: 94.2 FL (ref 80–100)
MONOCYTES # BLD AUTO: 0.3 K/UL (ref 0–1)
MONOCYTES NFR BLD AUTO: 6 %
NEUTROPHILS # BLD AUTO: 3.5 K/UL (ref 1.3–7.7)
NEUTROPHILS NFR BLD AUTO: 75 %
PLATELET # BLD AUTO: 197 K/UL (ref 150–450)
POTASSIUM SERPL-SCNC: 3.7 MMOL/L (ref 3.5–5.1)
PROT SERPL-MCNC: 6.5 G/DL (ref 6.3–8.2)
PT BLD: 9.9 SEC (ref 9–12)
SODIUM SERPL-SCNC: 143 MMOL/L (ref 137–145)
WBC # BLD AUTO: 4.7 K/UL (ref 3.8–10.6)

## 2019-08-01 PROCEDURE — 99285 EMERGENCY DEPT VISIT HI MDM: CPT

## 2019-08-01 PROCEDURE — 93306 TTE W/DOPPLER COMPLETE: CPT

## 2019-08-01 PROCEDURE — 36415 COLL VENOUS BLD VENIPUNCTURE: CPT

## 2019-08-01 PROCEDURE — 96374 THER/PROPH/DIAG INJ IV PUSH: CPT

## 2019-08-01 PROCEDURE — 85610 PROTHROMBIN TIME: CPT

## 2019-08-01 PROCEDURE — 85730 THROMBOPLASTIN TIME PARTIAL: CPT

## 2019-08-01 PROCEDURE — 85025 COMPLETE CBC W/AUTO DIFF WBC: CPT

## 2019-08-01 PROCEDURE — 94760 N-INVAS EAR/PLS OXIMETRY 1: CPT

## 2019-08-01 PROCEDURE — 80061 LIPID PANEL: CPT

## 2019-08-01 PROCEDURE — 80053 COMPREHEN METABOLIC PANEL: CPT

## 2019-08-01 PROCEDURE — 93454 CORONARY ARTERY ANGIO S&I: CPT

## 2019-08-01 PROCEDURE — 83735 ASSAY OF MAGNESIUM: CPT

## 2019-08-01 PROCEDURE — 93005 ELECTROCARDIOGRAM TRACING: CPT

## 2019-08-01 PROCEDURE — 84484 ASSAY OF TROPONIN QUANT: CPT

## 2019-08-01 PROCEDURE — 71046 X-RAY EXAM CHEST 2 VIEWS: CPT

## 2019-08-01 PROCEDURE — 96372 THER/PROPH/DIAG INJ SC/IM: CPT

## 2019-08-01 RX ADMIN — METOPROLOL TARTRATE SCH MG: 25 TABLET, FILM COATED ORAL at 20:53

## 2019-08-01 RX ADMIN — DORZOLAMIDE HYDROCHLORIDE AND TIMOLOL MALEATE SCH DROPS: 20; 5 SOLUTION/ DROPS OPHTHALMIC at 20:54

## 2019-08-01 RX ADMIN — CEFAZOLIN SCH MLS/HR: 330 INJECTION, POWDER, FOR SOLUTION INTRAMUSCULAR; INTRAVENOUS at 16:13

## 2019-08-01 RX ADMIN — DORZOLAMIDE HYDROCHLORIDE AND TIMOLOL MALEATE SCH DROPS: 20; 5 SOLUTION/ DROPS OPHTHALMIC at 16:12

## 2019-08-01 RX ADMIN — SODIUM CHLORIDE, PRESERVATIVE FREE SCH: 5 INJECTION INTRAVENOUS at 20:54

## 2019-08-01 RX ADMIN — NITROGLYCERIN SCH INCH: 20 OINTMENT TOPICAL at 12:35

## 2019-08-01 RX ADMIN — BRIMONIDINE TARTRATE SCH DROPS: 2 SOLUTION/ DROPS OPHTHALMIC at 20:54

## 2019-08-01 RX ADMIN — BRIMONIDINE TARTRATE SCH DROPS: 2 SOLUTION/ DROPS OPHTHALMIC at 16:12

## 2019-08-01 RX ADMIN — LISINOPRIL SCH MG: 5 TABLET ORAL at 16:12

## 2019-08-01 RX ADMIN — NITROGLYCERIN SCH: 20 OINTMENT TOPICAL at 18:13

## 2019-08-01 NOTE — P.HPIM
History of Present Illness


H&P Date: 19


Chief Complaint: Chest pain











This is a 79-year-old  female in Dr. Reynoso with a previous medical

history significant for coronary artery disease status post PCI and stent 

placement of the obtuse marginal branch 1 back in 2018, hypertension and 

hypertensive cardio vascular disease with left ventricular hypertrophy, 

hyperlipidemia, history of uterine cancer back in , legal blindness due to 

glaucoma, mitral valve prolapse, history of DVT, with last admission 2019 

secondary to partial small bowel obstruction requiring medical management only. 

Next





She was seen in the emergency room secondary to chest discomfort, that started 

last night, mainly pressure, relieved with nitroglycerin, patient woke up this 

morning, again the chest pain has returned, there was no radiation, there is 

minimal on dyspnea no nausea no diaphoresis, she mentioned this is similar to 

her previous stent placement in the past in 





In the emergency room, EKG shows no acute ST-T wave changes, has septal Q waves,

sinus rhythm of 61 troponin was 0.012 creatinine of 0.7





Review of Systems


Constitutional: Reports as per HPI, Denies anorexia, Denies chills, Denies 

chronic headaches, Denies chronic pain, Denies daytime sleepiness, Denies 

fatigue, Denies fever, Denies lethargy, Denies malaise, Denies night sweats, 

Denies poor appetite, Denies sweats, Denies weakness, Denies weight gain, Denies

weight loss


Ears, nose, mouth and throat: Reports as per HPI


Cardiovascular: Reports as per HPI, Reports chest pain, Reports shortness of 

breath


Respiratory: Reports as per HPI, Denies congestion, Denies cough, Denies cough 

with sputum, Denies dyspnea, Denies excessive sputum, Denies hemoptysis, Denies 

home oxygen, Denies pain, Denies pain on inspiration, Denies pleurisy, Denies 

respiratory infections, Denies sleep apnea, Denies snoring, Denies wheezing


Gastrointestinal: Reports as per HPI


Genitourinary: Reports as per HPI, Denies abnormal vaginal bleeding, Denies 

decreased libido, Denies difficulty conceiving, Denies difficulty voiding, 

Denies dysmenorrhea, Denies dyspareunia, Denies dysuria, Denies flank pain, 

Denies genital sores, Denies hematuria, Denies hot flashes, Denies incomplete 

emptying, Denies kidney stones, Denies menorrhagia, Denies mixed incontinence, 

Denies nocturia, Denies pelvic pain, Denies post void dribbling, Denies 

pregnant, Denies prolapse symptoms, Denies stress incontinence, Denies urge 

incontinence, Denies urgency, Denies urinary frequency, Denies vaginal discha

rge, Denies vaginal dryness, Denies vaginal itching, Denies vaginal odor


Menstruation: Reports as per HPI


Musculoskeletal: Reports as per HPI


Integumentary: Reports as per HPI


Neurological: Reports as per HPI, Denies aphasia, Denies ataxia, Denies balance 

difficulties, Denies burning pain, Denies change in mentation, Denies change in 

smell/taste, Denies change in speech, Denies confusion, Denies convulsions, 

Denies double vision, Denies gait dysfunction, Denies head injury, Denies 

headaches, Denies hearing difficulties, Denies lack of coordination, Denies loss

of vision, Denies memory loss, Denies migraines, Denies motor disturbance, 

Denies numbness, Denies paralysis, Denies paresthesias, Denies seizures, Denies 

sensory deficit, Denies spasticity, Denies syncope, Denies tic, Denies tingling,

Denies transient paralysis, Denies tremors, Denies vertigo, Denies weakness, 

Denies visual changes


Psychiatric: Reports as per HPI, Denies anhedonia, Denies anxiety, Denies 

anxiety attacks, Denies change in appetite, Denies change in libido, Denies 

change in sleep habits, Denies confusion, Denies depression, Denies difficulty 

concentrating, Denies disorientation, Denies hallucinations, Denies 

hopelessness, Denies hypersomnia, Denies insomnia, Denies irritability, Denies 

memory loss, Denies mood swings, Denies paranoia, Denies sadness/tearfulness, 

Denies sleep disturbances, Denies suicidal ideation


Endocrine: Reports as per HPI, Denies cold intolerance, Denies deepening of the 

voice, Denies excessive sweating, Denies excessive thirst, Denies fatigue, 

Denies flushing, Denies heat intolerance, Denies high blood sugars, Denies 

increase in ring/shoe/hat size, Denies low blood sugars, Denies nocturia, Denies

palpitations, Denies polydipsia, Denies polyphagia, Denies polyuria, Denies 

proptosis, Denies recent glucocorticoid use, Denies thyroid mass, Denies weight 

change


Hematologic/Lymphatic: Reports as per HPI


Allergic/Immunologic: Reports as per HPI, Denies allergic rhinitis, Denies a

naphylaxis, Denies angioedema, Denies gluten intolerance, Denies persistent 

infections, Denies seasonal allergies, Denies urticaria, Denies wheezing





Past Medical History


Past Medical History: Coronary Artery Disease (CAD), Cancer, Deep Vein 

Thrombosis (DVT), Eye Disorder, GERD/Reflux, Hyperlipidemia, Hypertension, 

Myocardial Infarction (MI), Mitral Valve Prolapse (MVP), Osteoarthritis (OA)


Additional Past Medical History / Comment(s): Hx of bowel obstruction 2018 

while in [place], hx of partial bowel obstruction 2019. Legal blindness due 

to glaucoma, neuropathy bilateral legs. Hx of uterine cancer , endometrial 

and cervical cancer treated with radiation Hx of DVT 2-3 yrs ago.


Last Myocardial Infarction Date:: 18


History of Any Multi-Drug Resistant Organisms: None Reported


Past Surgical History: Breast Surgery, Heart Catheterization With Stent, 

Hysterectomy


Additional Past Surgical History / Comment(s): Eye surgery, breast biopsy, 1 

cardiac stent.


Past Anesthesia/Blood Transfusion Reactions: No Reported Reaction


Additional Past Anesthesia/Blood Transfusion Reaction / Comment(s): Slow to wake


Date of Last Stent Placement:: 


Past Psychological History: No Psychological Hx Reported


Smoking Status: Never smoker


Past Alcohol Use History: None Reported


Past Drug Use History: None Reported





- Past Family History


  ** Mother


Family Medical History: Cancer, Renal Disease


Additional Family Medical History / Comment(s): Uterine CA.





  ** Brother(s)


Family Medical History: Cancer


Additional Family Medical History / Comment(s): Colon CA.





  ** Sister(s)


Family Medical History: Cancer


Additional Family Medical History / Comment(s): Colon and breast CA.





  ** Father


Family Medical History: Myocardial Infarction (MI)


Additional Family Medical History / Comment(s):  at 54 from MI.





Medications and Allergies


                                Home Medications











 Medication  Instructions  Recorded  Confirmed  Type


 


Gabapentin [Neurontin] 300 mg PO HS 18 History


 


Brimonidine Tartrate [Alphagan P 1 drops BOTH EYES TID 18 History





0.2% Ophth Soln]    


 


Dorzolamide/Timolol/Pf [Cosopt Pf 1 applicator BOTH EYES TID 18 

History





2%/5% Ophth Droperette]    


 


Furosemide [Lasix] 20 mg PO DAILY PRN 18 History


 


Atorvastatin [Lipitor] 40 mg PO DAILY #30 tab 18 Rx


 


Clopidogrel [Plavix] 75 mg PO DAILY #30 tab 18 Rx


 


Nitroglycerin Sl Tabs [Nitrostat] 0.4 mg SUBLINGUAL Q5M PRN #25 tab 18 Rx


 


Docusate Sodium [Dok] 100 mg PO HS 18 History


 


Famotidine [Pepcid] 20 mg PO DAILY PRN 19 History


 


Isosorbide Mononitrate ER [Imdur] 30 mg PO DAILY 19 History


 


Potassium Chloride [Klor-Con 20] 10 meq PO DAILY PRN 19 History


 


Calcium Carbonate/Vitamin D3 1 cap PO BID 19 History





[Calcium 600-Vit D3 500 Softgel]    


 


Aspirin EC [Ecotrin Low Dose] 81 mg PO HS 19 History


 


Metoprolol Tartrate [Lopressor] 25 mg PO BID@1200,2100 19 History








                                    Allergies











Allergy/AdvReac Type Severity Reaction Status Date / Time


 


ibuprofen [From Advil] AdvReac  Nausea & Verified 19 10:33





   Vomiting  














Physical Exam


Vitals: 


                                   Vital Signs











  Temp Pulse Pulse Pulse Resp BP BP


 


 19 13:08  97.5 F L    62  18   191/66


 


 19 12:35   62    18  172/73 


 


 19 12:06   66    18  148/62 


 


 19 10:54   63    18  168/82 


 


 19 10:32    61    


 


 19 10:08  98 F  74    18  173/71 














  Pulse Ox


 


 19 13:08  100


 


 19 12:35  99


 


 19 12:06  100


 


 19 10:54  97


 


 19 10:32 


 


 19 10:08  99








                                Intake and Output











 19





 22:59 06:59 14:59


 


Other:   


 


  Weight   51.256 kg














- Constitutional


General appearance: cooperative, no acute distress





- EENT


Eyes: anicteric sclerae, EOMI, PERRLA, dentition normal, normal appearance


ENT: hard of hearing, hearing grossly normal, normal oropharynx





- Respiratory


Respiratory: bilateral: CTA, negative: diminished, dullness, rales, rhonchi, 

wheezing





- Cardiovascular


Rhythm: regular


Heart sounds: normal: S1, S2


Abnormal Heart Sounds: no systolic murmur, no diastolic murmur, no rub, no S3 

Gallop, no S4 Gallop, no click, no other





- Gastrointestinal


General gastrointestinal: normal bowel sounds, soft





- Integumentary


Integumentary: calor





- Neurologic


Neurologic: CNII-XII intact





- Musculoskeletal


Musculoskeletal: gait normal





- Psychiatric


Psychiatric: A&O x's 3, appropriate affect





Results


CBC & Chem 7: 


                                 19 10:20





                                 19 10:20


Labs: 


                  Abnormal Lab Results - Last 24 Hours (Table)











  19 Range/Units





  10:20 10:20 10:20 


 


Lymphocytes #  0.7 L    (1.0-4.8)  k/uL


 


APTT    18.9 L  (22.0-30.0)  sec


 


Chloride   108 H   ()  mmol/L


 


Glucose   107 H   (74-99)  mg/dL








                               Laboratory Results











WBC  4.7 k/uL (3.8-10.6)   19  10:20    


 


RBC  4.21 m/uL (3.80-5.40)   19  10:20    


 


Hgb  12.3 gm/dL (11.4-16.0)   19  10:20    


 


Hct  39.7 % (34.0-46.0)   19  10:20    


 


MCV  94.2 fL (80.0-100.0)   19  10:20    


 


MCH  29.3 pg (25.0-35.0)   19  10:20    


 


MCHC  31.1 g/dL (31.0-37.0)   19  10:20    


 


RDW  14.5 % (11.5-15.5)   19  10:20    


 


Plt Count  197 k/uL (150-450)   19  10:20    


 


Neutrophils %  75 %  19  10:20    


 


Lymphocytes %  16 %  19  10:20    


 


Monocytes %  6 %  19  10:20    


 


Eosinophils %  1 %  19  10:20    


 


Basophils %  1 %  19  10:20    


 


Neutrophils #  3.5 k/uL (1.3-7.7)   19  10:20    


 


Lymphocytes #  0.7 k/uL (1.0-4.8)  L  19  10:20    


 


Monocytes #  0.3 k/uL (0-1.0)   19  10:20    


 


Eosinophils #  0.1 k/uL (0-0.7)   19  10:20    


 


Basophils #  0.0 k/uL (0-0.2)   19  10:20    


 


Hypochromasia  Slight   19  10:20    


 


PT  9.9 sec (9.0-12.0)   19  10:20    


 


INR  0.9  (<1.2)   19  10:20    


 


APTT  18.9 sec (22.0-30.0)  L  19  10:20    


 


Sodium  143 mmol/L (137-145)   19  10:20    


 


Potassium  3.7 mmol/L (3.5-5.1)   19  10:20    


 


Chloride  108 mmol/L ()  H  19  10:20    


 


Carbon Dioxide  26 mmol/L (22-30)   19  10:20    


 


Anion Gap  9 mmol/L  19  10:20    


 


BUN  17 mg/dL (7-17)   19  10:20    


 


Creatinine  0.74 mg/dL (0.52-1.04)   19  10:20    


 


Est GFR (CKD-EPI)AfAm  >90  (>60 ml/min/1.73 sqM)   19  10:20    


 


Est GFR (CKD-EPI)NonAf  78  (>60 ml/min/1.73 sqM)   19  10:20    


 


Glucose  107 mg/dL (74-99)  H  19  10:20    


 


Calcium  9.3 mg/dL (8.4-10.2)   19  10:20    


 


Magnesium  2.0 mg/dL (1.6-2.3)   19  10:20    


 


Total Bilirubin  0.8 mg/dL (0.2-1.3)   19  10:20    


 


AST  26 U/L (14-36)   19  10:20    


 


ALT  19 U/L (9-52)   19  10:20    


 


Alkaline Phosphatase  71 U/L ()   19  10:20    


 


Troponin I  <0.012 ng/mL (0.000-0.034)   19  10:20    


 


Total Protein  6.5 g/dL (6.3-8.2)   19  10:20    


 


Albumin  3.6 g/dL (3.5-5.0)   19  10:20    














Thrombosis Risk Factor Assmnt





- DVT/VTE Prophylaxis


DVT/VTE Prophylaxis: Low risk, early ambulation encouraged





- Choose All That Apply


Any of the Below Risk Factors Present?: Yes


Other Risk Factors: Yes


Each Risk Factor Represents 2 Points: Malignancy


Each Risk Factor Represents 3 Points: Age 75 years or older


Other congenital or acquired thrombophilia - If yes, enter type in comment: No


Thrombosis Risk Factor Assessment Total Risk Factor Score: 5


Thrombosis Risk Factor Assessment Level: High Risk





Assessment and Plan


Plan: 





1.  Unstable angina, with known history off CAD, patient will be heparinized, 

cardiac biomarkers with serial on touring, cardiology to see, echocardiogram to 

be done, sublingual nitro when necessary, lipid panel, stress test imaging prior

to discharge if troponins are negative, aspirin, Plavix, Lipitor, metoprolol, 

Imdur





2.  CAD post-PCI of the OM1.  Continue patient on aspirin 81 mg once every day, 

Plavix 75 mg orally once every day, metoprolol 25 mg orally twice every day, 

Lipitor 20 mg orally once every day and Imdur 30 mg orally once every day.





3.  Urgent hypertension with hypertension and hypertensive cardiovascular 

disease.   we will resume metoprolol 25 mg orally twice every day and lisinopril

2.5 mg orally once every day.,\  We'll provide hydralazine 10 every 6 when 

necessary, IV, patient is not on a sore are prior to admission, start lisinopril

5 mg daily





5.  Hyperlipidemia.  Continue patient on Lipitor 20 mg orally once every day.





6.  History of DVT in the past.  Resolved.





7.  History of glaucoma.  Continue with current eyedrops.





8.  History of endometrial and cervical cancer post radiation.





9.  DVT prophylaxis.  Heparin 5000 units subcutaneously every 8 hours.





10.  GI prophylaxis.  Continue PPI.





11.  Patient is full code.

## 2019-08-01 NOTE — ED
General Adult HPI





- General


Chief complaint: Chest Pain


Stated complaint: Chest pain


Time Seen by Provider: 19 10:05


Source: patient, EMS, RN notes reviewed


Mode of arrival: EMS


Limitations: no limitations





- History of Present Illness


Initial comments: 





Patient is a pleasant 79-year-old female presenting to the emergency department 

with complaints of chest discomfort.  Onset of symptoms was last night.  

Discomfort is described as pressure.  Patient did receive some improvement with 

nitroglycerin however was mild.  Discomfort seemed to go away and returned this 

morning.  Discomfort is currently 6/10.  No radiation.  Patient does have some 

mild associated dyspnea.  No nausea or diaphoresis.  Patient did have similar 

symptoms twice previously associated with stent placement.





- Related Data


                                Home Medications











 Medication  Instructions  Recorded  Confirmed


 


Gabapentin [Neurontin] 300 mg PO HS 18


 


Brimonidine Tartrate [Alphagan P 1 drops BOTH EYES TID 18





0.2% Ophth Soln]   


 


Dorzolamide/Timolol/Pf [Cosopt Pf 1 applicator BOTH EYES TID 18





2%/5% Ophth Droperette]   


 


Furosemide [Lasix] 20 mg PO DAILY PRN 18


 


Docusate Sodium [Dok] 100 mg PO HS 18


 


Famotidine [Pepcid] 20 mg PO DAILY PRN 19


 


Isosorbide Mononitrate ER [Imdur] 30 mg PO DAILY 19


 


Potassium Chloride [Klor-Con 20] 10 meq PO DAILY PRN 19


 


Calcium Carbonate/Vitamin D3 1 cap PO BID 19





[Calcium 600-Vit D3 500 Softgel]   


 


Aspirin EC [Ecotrin Low Dose] 81 mg PO HS 19


 


Metoprolol Tartrate [Lopressor] 25 mg PO BID@1200,2100 19








                                  Previous Rx's











 Medication  Instructions  Recorded


 


Atorvastatin [Lipitor] 40 mg PO DAILY #30 tab 18


 


Clopidogrel [Plavix] 75 mg PO DAILY #30 tab 18


 


Nitroglycerin Sl Tabs [Nitrostat] 0.4 mg SUBLINGUAL Q5M PRN #25 tab 18











                                    Allergies











Allergy/AdvReac Type Severity Reaction Status Date / Time


 


ibuprofen [From Advil] AdvReac  Nausea & Verified 19 10:33





   Vomiting  














Review of Systems


ROS Statement: 


Those systems with pertinent positive or pertinent negative responses have been 

documented in the HPI.





ROS Other: All systems not noted in ROS Statement are negative.


Constitutional: Denies: fever


Eyes: Denies: eye pain


ENT: Denies: ear pain


Respiratory: Reports: as per HPI.  Denies: cough


Cardiovascular: Reports: as per HPI


Endocrine: Denies: fatigue


Gastrointestinal: Denies: abdominal pain, nausea


Genitourinary: Denies: dysuria


Musculoskeletal: Denies: back pain


Skin: Denies: rash


Neurological: Denies: weakness





Past Medical History


Past Medical History: Coronary Artery Disease (CAD), Cancer, Deep Vein 

Thrombosis (DVT), Eye Disorder, GERD/Reflux, Hyperlipidemia, Hypertension, 

Myocardial Infarction (MI), Mitral Valve Prolapse (MVP), Osteoarthritis (OA)


Additional Past Medical History / Comment(s): Hx of bowel obstruction 2018 

while in [place], hx of partial bowel obstruction 2019. Legal blindness due 

to glaucoma, neuropathy bilateral legs. Hx of uterine cancer , endometrial 

and cervical cancer treated with radiation Hx of DVT 2-3 yrs ago.


Last Myocardial Infarction Date:: 18


History of Any Multi-Drug Resistant Organisms: None Reported


Past Surgical History: Breast Surgery, Heart Catheterization With Stent, 

Hysterectomy


Additional Past Surgical History / Comment(s): Eye surgery, breast biopsy, 1 

cardiac stent.


Past Anesthesia/Blood Transfusion Reactions: No Reported Reaction


Date of Last Stent Placement:: 


Past Psychological History: No Psychological Hx Reported


Smoking Status: Never smoker


Past Alcohol Use History: None Reported


Past Drug Use History: None Reported





- Past Family History


  ** Mother


Family Medical History: Cancer, Renal Disease


Additional Family Medical History / Comment(s): Uterine CA.





  ** Brother(s)


Family Medical History: Cancer


Additional Family Medical History / Comment(s): Colon CA.





  ** Sister(s)


Family Medical History: Cancer


Additional Family Medical History / Comment(s): Colon and breast CA.





  ** Father


Family Medical History: Myocardial Infarction (MI)


Additional Family Medical History / Comment(s):  at 54 from MI.





General Exam


Limitations: no limitations


General appearance: alert, in no apparent distress


Head exam: Present: atraumatic


Eye exam: Present: normal appearance, PERRL


ENT exam: Present: normal oropharynx


Neck exam: Present: normal inspection


Respiratory exam: Present: normal lung sounds bilaterally.  Absent: chest wall 

tenderness


Cardiovascular Exam: Present: regular rate, normal rhythm


  ** Expanded


Peripheral pulses: 2+: Radial (R), Radial (L), Posterior Tibialis (R), Posterior

Tibialis (L)


GI/Abdominal exam: Present: soft.  Absent: tenderness


Extremities exam: Present: normal inspection.  Absent: pedal edema, calf 

tenderness


Neurological exam: Present: alert


Psychiatric exam: Present: normal affect, normal mood


Skin exam: Present: normal color





Course


                                   Vital Signs











  19





  10:08 10:32 10:54


 


Temperature 98 F  


 


Pulse Rate 74  63


 


Pulse Rate [  61 





Cardiac Monitor   





]   


 


Respiratory 18  18





Rate   


 


Blood Pressure 173/71  168/82


 


O2 Sat by Pulse 99  97





Oximetry   














  19





  12:06 12:35


 


Temperature  


 


Pulse Rate 66 62


 


Pulse Rate [  





Cardiac Monitor  





]  


 


Respiratory 18 18





Rate  


 


Blood Pressure 148/62 172/73


 


O2 Sat by Pulse 100 99





Oximetry  














EKG Findings





- EKG Comments:


EKG Findings:: Normal sinus rhythm 61.  .  QRS 86.  .  QTc 422.  

Normal axis.  Septal Q waves.  No acute ST change.





Medical Decision Making





- Medical Decision Making





Patient reevaluated and resting comfortably in bed.  Symptoms improved with 

nitroglycerin however not completely resolved.  Patient updated on results and 

plan.





Case was discussed with Dr. Cannon, who will admit covering for Dr. Reynoso.





- Lab Data


Result diagrams: 


                                 19 10:20





                                 19 10:20


                                   Lab Results











  19 Range/Units





  10:20 10:20 10:20 


 


WBC  4.7    (3.8-10.6)  k/uL


 


RBC  4.21    (3.80-5.40)  m/uL


 


Hgb  12.3    (11.4-16.0)  gm/dL


 


Hct  39.7    (34.0-46.0)  %


 


MCV  94.2    (80.0-100.0)  fL


 


MCH  29.3    (25.0-35.0)  pg


 


MCHC  31.1    (31.0-37.0)  g/dL


 


RDW  14.5    (11.5-15.5)  %


 


Plt Count  197    (150-450)  k/uL


 


Neutrophils %  75    %


 


Lymphocytes %  16    %


 


Monocytes %  6    %


 


Eosinophils %  1    %


 


Basophils %  1    %


 


Neutrophils #  3.5    (1.3-7.7)  k/uL


 


Lymphocytes #  0.7 L    (1.0-4.8)  k/uL


 


Monocytes #  0.3    (0-1.0)  k/uL


 


Eosinophils #  0.1    (0-0.7)  k/uL


 


Basophils #  0.0    (0-0.2)  k/uL


 


Hypochromasia  Slight    


 


PT    9.9  (9.0-12.0)  sec


 


INR    0.9  (<1.2)  


 


APTT    18.9 L  (22.0-30.0)  sec


 


Sodium   143   (137-145)  mmol/L


 


Potassium   3.7   (3.5-5.1)  mmol/L


 


Chloride   108 H   ()  mmol/L


 


Carbon Dioxide   26   (22-30)  mmol/L


 


Anion Gap   9   mmol/L


 


BUN   17   (7-17)  mg/dL


 


Creatinine   0.74   (0.52-1.04)  mg/dL


 


Est GFR (CKD-EPI)AfAm   >90   (>60 ml/min/1.73 sqM)  


 


Est GFR (CKD-EPI)NonAf   78   (>60 ml/min/1.73 sqM)  


 


Glucose   107 H   (74-99)  mg/dL


 


Calcium   9.3   (8.4-10.2)  mg/dL


 


Magnesium   2.0   (1.6-2.3)  mg/dL


 


Total Bilirubin   0.8   (0.2-1.3)  mg/dL


 


AST   26   (14-36)  U/L


 


ALT   19   (9-52)  U/L


 


Alkaline Phosphatase   71   ()  U/L


 


Troponin I     (0.000-0.034)  ng/mL


 


Total Protein   6.5   (6.3-8.2)  g/dL


 


Albumin   3.6   (3.5-5.0)  g/dL














  19 Range/Units





  10:20 


 


WBC   (3.8-10.6)  k/uL


 


RBC   (3.80-5.40)  m/uL


 


Hgb   (11.4-16.0)  gm/dL


 


Hct   (34.0-46.0)  %


 


MCV   (80.0-100.0)  fL


 


MCH   (25.0-35.0)  pg


 


MCHC   (31.0-37.0)  g/dL


 


RDW   (11.5-15.5)  %


 


Plt Count   (150-450)  k/uL


 


Neutrophils %   %


 


Lymphocytes %   %


 


Monocytes %   %


 


Eosinophils %   %


 


Basophils %   %


 


Neutrophils #   (1.3-7.7)  k/uL


 


Lymphocytes #   (1.0-4.8)  k/uL


 


Monocytes #   (0-1.0)  k/uL


 


Eosinophils #   (0-0.7)  k/uL


 


Basophils #   (0-0.2)  k/uL


 


Hypochromasia   


 


PT   (9.0-12.0)  sec


 


INR   (<1.2)  


 


APTT   (22.0-30.0)  sec


 


Sodium   (137-145)  mmol/L


 


Potassium   (3.5-5.1)  mmol/L


 


Chloride   ()  mmol/L


 


Carbon Dioxide   (22-30)  mmol/L


 


Anion Gap   mmol/L


 


BUN   (7-17)  mg/dL


 


Creatinine   (0.52-1.04)  mg/dL


 


Est GFR (CKD-EPI)AfAm   (>60 ml/min/1.73 sqM)  


 


Est GFR (CKD-EPI)NonAf   (>60 ml/min/1.73 sqM)  


 


Glucose   (74-99)  mg/dL


 


Calcium   (8.4-10.2)  mg/dL


 


Magnesium   (1.6-2.3)  mg/dL


 


Total Bilirubin   (0.2-1.3)  mg/dL


 


AST   (14-36)  U/L


 


ALT   (9-52)  U/L


 


Alkaline Phosphatase   ()  U/L


 


Troponin I  <0.012  (0.000-0.034)  ng/mL


 


Total Protein   (6.3-8.2)  g/dL


 


Albumin   (3.5-5.0)  g/dL














- Radiology Data


Radiology results: image reviewed (Chest x-ray reveals no acute process)





Disposition


Clinical Impression: 


 Chest pain





Disposition: ADMITTED AS IP TO [facility]


Is patient prescribed a controlled substance at d/c from ED?: No


Decision Time: 12:08

## 2019-08-02 VITALS — TEMPERATURE: 97.6 F | DIASTOLIC BLOOD PRESSURE: 70 MMHG | SYSTOLIC BLOOD PRESSURE: 147 MMHG | HEART RATE: 70 BPM

## 2019-08-02 VITALS — RESPIRATION RATE: 16 BRPM

## 2019-08-02 LAB
CHOLEST SERPL-MCNC: 112 MG/DL (ref ?–200)
HDLC SERPL-MCNC: 46 MG/DL (ref 40–60)
LDLC SERPL CALC-MCNC: 51 MG/DL (ref 0–99)
TRIGL SERPL-MCNC: 77 MG/DL (ref ?–150)

## 2019-08-02 RX ADMIN — METOPROLOL TARTRATE SCH MG: 25 TABLET, FILM COATED ORAL at 14:50

## 2019-08-02 RX ADMIN — DORZOLAMIDE HYDROCHLORIDE AND TIMOLOL MALEATE SCH DROPS: 20; 5 SOLUTION/ DROPS OPHTHALMIC at 16:40

## 2019-08-02 RX ADMIN — NITROGLYCERIN SCH INCH: 20 OINTMENT TOPICAL at 00:05

## 2019-08-02 RX ADMIN — SODIUM CHLORIDE, PRESERVATIVE FREE SCH: 5 INJECTION INTRAVENOUS at 09:15

## 2019-08-02 RX ADMIN — LISINOPRIL SCH MG: 5 TABLET ORAL at 16:48

## 2019-08-02 RX ADMIN — BRIMONIDINE TARTRATE SCH DROPS: 2 SOLUTION/ DROPS OPHTHALMIC at 16:40

## 2019-08-02 RX ADMIN — NITROGLYCERIN SCH INCH: 20 OINTMENT TOPICAL at 06:00

## 2019-08-02 RX ADMIN — PANTOPRAZOLE SODIUM SCH: 40 TABLET, DELAYED RELEASE ORAL at 18:18

## 2019-08-02 RX ADMIN — VERAPAMIL HYDROCHLORIDE ONE ML: 2.5 INJECTION, SOLUTION INTRAVENOUS at 12:29

## 2019-08-02 RX ADMIN — BRIMONIDINE TARTRATE SCH DROPS: 2 SOLUTION/ DROPS OPHTHALMIC at 09:13

## 2019-08-02 RX ADMIN — DORZOLAMIDE HYDROCHLORIDE AND TIMOLOL MALEATE SCH DROPS: 20; 5 SOLUTION/ DROPS OPHTHALMIC at 09:14

## 2019-08-02 RX ADMIN — PANTOPRAZOLE SODIUM SCH MG: 40 TABLET, DELAYED RELEASE ORAL at 09:13

## 2019-08-02 RX ADMIN — CEFAZOLIN SCH MLS/HR: 330 INJECTION, POWDER, FOR SOLUTION INTRAMUSCULAR; INTRAVENOUS at 04:27

## 2019-08-02 RX ADMIN — VERAPAMIL HYDROCHLORIDE ONE ML: 2.5 INJECTION, SOLUTION INTRAVENOUS at 12:37

## 2019-08-02 NOTE — PN
PROGRESS NOTE



Mrs. Sullivan is comfortable resting without symptoms. Her troponins are normal. EKG is

unremarkable.  However yesterday after she got up and ambulated, she had chest

pressure, raising the possibility of angina.  In view of the fact she has recurrent

episodes of chest pain, even though troponins are normal, because she had a cardiac

cath about 6 weeks ago, I am recommending a repeat cardiac cath and possible

intervention.  I explained this to the patient.  Discussed with her the risks,

benefits, options and rationale.  I spoke to Dr. Tobin. He will perform procedure

today.



Physical exam wise, there are no new significant findings and vital signs are stable.





MMODL / IJN: [id number] / Job#: [id number]

## 2019-08-02 NOTE — P.DS
Providers


Date of admission: 


08/01/19 12:08





Expected date of discharge: 08/02/19


Attending physician: 


Deborah Cannon





Consults: 





                                        





08/01/19 12:08


Consult Physician Urgent 


   Consulting Provider: Orlin Miles


   Consult Reason/Comments: cp


   Do you want consulting provider notified?: Yes











Primary care physician: 


Ashwin SimpsonEdgardoCentral [facility] Course: 








This is a 79-year-old  female in [place] with a previous medical

history significant for coronary artery disease status post PCI and stent 

placement of the obtuse marginal branch 1 back in June 2018, hypertension and 

hypertensive cardio vascular disease with left ventricular hypertrophy, 

hyperlipidemia, history of uterine cancer back in 2010, legal blindness due to 

glaucoma, mitral valve prolapse, history of DVT, with last admission April 2019 

secondary to partial small bowel obstruction requiring medical management only. 

Next





She was seen in the emergency room secondary to chest discomfort, that started 

last night, mainly pressure, relieved with nitroglycerin, patient woke up this 

morning, again the chest pain has returned, there was no radiation, there is 

minimal on dyspnea no nausea no diaphoresis, she mentioned this is similar to 

her previous stent placement in the past in 2018





In the emergency room, EKG shows no acute ST-T wave changes, has septal Q waves,

sinus rhythm of 61 troponin was 0.012 creatinine of 0.7








8/2: Patient is undergone heart catheterization which found no new lesions.  

Patient has been cleared for discharge home by cardiology.  Cerebral is only new

medication secondary to blood pressure elevation.  Prescription has been sent to

her pharmacy.  Patient will be discharged home today in stable condition.








Discharge diagnoses:


1.  Unstable angina, with known history of CAD status post heart catheterization

with no new lesions noted.


2.  CAD post-PCI of the OM1.


3.  Urgent hypertension with hypertension and hypertensive cardiovascular 

disease.  


4.  Hyperlipidemia. 


5.  History of DVT in the past. 


6.  History of glaucoma. 


7.  History of endometrial and cervical cancer post radiation.





Discharge plan: Home





Impression and plan of care have been directed as dictated by the signing 

physician.  Yazmin Boogie nurse practitioner acting as scribe for signing 

physician.





Plan - Discharge Summary


Discharge Rx Participant: No


New Discharge Prescriptions: 


New


   Lisinopril [Zestril] 5 mg PO W/SUPPER #30 tab





Continue


   Gabapentin [Neurontin] 300 mg PO HS


   Brimonidine Tartrate [Alphagan P 0.2% Ophth Soln] 1 drops BOTH EYES TID


   Furosemide [Lasix] 20 mg PO DAILY PRN


     PRN Reason: Edema


   Dorzolamide/Timolol/Pf [Cosopt Pf 2%/5% Ophth Droperette] 1 applicator BOTH 

EYES TID


   Atorvastatin [Lipitor] 40 mg PO DAILY #30 tab


   Clopidogrel [Plavix] 75 mg PO DAILY #30 tab


   Nitroglycerin Sl Tabs [Nitrostat] 0.4 mg SUBLINGUAL Q5M PRN #25 tab


     PRN Reason: Chest Pain


   Docusate Sodium [Dok] 100 mg PO HS


   Isosorbide Mononitrate ER [Imdur] 30 mg PO DAILY


   Famotidine [Pepcid] 20 mg PO DAILY PRN


     PRN Reason: gerd


   Potassium Chloride [Klor-Con 20] 10 meq PO DAILY PRN


     PRN Reason: Edema


   Calcium Carbonate/Vitamin D3 [Calcium 600-Vit D3 500 Softgel] 1 cap PO BID


   Aspirin EC [Ecotrin Low Dose] 81 mg PO HS


   Metoprolol Tartrate [Lopressor] 25 mg PO BID@1200,2100


Discharge Medication List





Gabapentin [Neurontin] 300 mg PO HS 01/16/18 [History]


Brimonidine Tartrate [Alphagan P 0.2% Ophth Soln] 1 drops BOTH EYES TID 04/13/18

[History]


Dorzolamide/Timolol/Pf [Cosopt Pf 2%/5% Ophth Droperette] 1 applicator BOTH EYES

TID 04/13/18 [History]


Furosemide [Lasix] 20 mg PO DAILY PRN 04/13/18 [History]


Atorvastatin [Lipitor] 40 mg PO DAILY #30 tab 06/02/18 [Rx]


Clopidogrel [Plavix] 75 mg PO DAILY #30 tab 06/02/18 [Rx]


Nitroglycerin Sl Tabs [Nitrostat] 0.4 mg SUBLINGUAL Q5M PRN #25 tab 06/02/18 

[Rx]


Docusate Sodium [Dok] 100 mg PO HS 09/09/18 [History]


Famotidine [Pepcid] 20 mg PO DAILY PRN 03/26/19 [History]


Isosorbide Mononitrate ER [Imdur] 30 mg PO DAILY 03/26/19 [History]


Potassium Chloride [Klor-Con 20] 10 meq PO DAILY PRN 04/03/19 [History]


Calcium Carbonate/Vitamin D3 [Calcium 600-Vit D3 500 Softgel] 1 cap PO BID 

04/28/19 [History]


Aspirin EC [Ecotrin Low Dose] 81 mg PO HS 08/01/19 [History]


Metoprolol Tartrate [Lopressor] 25 mg PO BID@1200,2100 08/01/19 [History]


Lisinopril [Zestril] 5 mg PO W/SUPPER #30 tab 08/02/19 [Rx]








Follow up Appointment(s)/Referral(s): 


Miguel Tobin MD [STAFF PHYSICIAN] - 08/09/19 9:30 am


Ashwin Reynoso DO [Primary Care Provider] - 1 Week


Discharge Disposition: HOME SELF-CARE

## 2019-08-02 NOTE — ECHOF
Referral Reason:chest pain



MEASUREMENTS

--------

HEIGHT: 162.6 cm

WEIGHT: 51.3 kg

BP: 191/66

RVIDd:   2.3 cm     (< 3.3)

IVSd:   0.9 cm     (0.6 - 1.1)

LVIDd:   3.4 cm     (3.9 - 5.3)

LVPWd:   1.0 cm     (0.6 - 1.1)

IVSs:   1.4 cm

LVIDs:   2.2 cm

LVPWs:   1.2 cm

LA Diam:   2.8 cm     (2.7 - 3.8)

LAESV Index (A-L):   29.32 ml/m

Ao Diam:   2.8 cm     (2.0 - 3.7)

AV Cusp:   1.6 cm     (1.5 - 2.6)

MV EXCURSION:   22.386 mm     (> 18.000)

MV EF SLOPE:   119 mm/s     (70 - 150)

EPSS:   0.9 cm

MV E Jeffry:   0.98 m/s

MV DecT:   153 ms

MV A Jeffry:   0.71 m/s

MV E/A Ratio:   1.39 

AR PHT:   2164 ms

RAP:   5.00 mmHg

RVSP:   36.58 mmHg







FINDINGS

--------

Sinus rhythm.

This was a technically good study.

The left ventricular size is normal.   Left ventricular wall thickness is normal.   Overall left vent
ricular systolic function is normal with, an EF between 55 - 60 %.

The right ventricle is normal in size.

LA is midly dilated 29-33ml/m2.

The right atrium is normal in size.

Interatrial and interventricular septum intact.

Aortic valve is trileaflet and is mildly thickened.   There is mild aortic regurgitation.

The mitral valve leaflets are mildly thickened.   Mild mitral regurgitation is present.

Mild tricuspid regurgitation present.   There is mild pulmonary hypertension.   The right ventricular
 systolic pressure, as measured by Doppler, is 36.58mmHg.

Trace/mild (physiologic)  pulmonic regurgitation.

The aortic root size is normal.

Normal inferior vena cava with normal inspiratory collapse consistent with estimated right atrial pre
ssure of  5 mmHg.

There is no pericardial effusion.



CONCLUSIONS

--------

1. Sinus rhythm.

2. This was a technically good study.

3. The left ventricular size is normal.

4. Left ventricular wall thickness is normal.

5. Overall left ventricular systolic function is normal with, an EF between 55 - 60 %.

6. The right ventricle is normal in size.

7. LA is midly dilated 29-33ml/m2.

8. The right atrium is normal in size.

9. Interatrial and interventricular septum intact.

10. Aortic valve is trileaflet and is mildly thickened.

11. There is mild aortic regurgitation.

12. The mitral valve leaflets are mildly thickened.

13. Mild mitral regurgitation is present.

14. Mild tricuspid regurgitation present.

15. There is mild pulmonary hypertension.

16. The right ventricular systolic pressure, as measured by Doppler, is 36.58mmHg.

17. Trace/mild (physiologic)  pulmonic regurgitation.

18. The aortic root size is normal.

19. Normal inferior vena cava with normal inspiratory collapse consistent with estimated right atrial
 pressure of  5 mmHg.

20. There is no pericardial effusion.





SONOGRAPHER: Cadence Au RDCS

## 2019-08-02 NOTE — CC
CARDIAC CATHETERIZATION REPORT



Mrs. Sullivan is a 79-year-old female with a known history of coronary artery disease,

status post stenting of the LAD in the diagonal branch, who presented with symptoms of

chest discomfort without any acute ST-segment changes nor enzymatic changes.  She was

evaluated by Dr. Miles and recommendation made regarding cardiac catheterization, the

procedures, risks and complication were discussed with the patient who is in full

understanding and agreement.



PROCEDURE:

Patient was brought to the cath lab in a fasting semi-sedated state after receiving

fentanyl and Benadryl and achieving moderate conscious sedated state.  Using Xylocaine

anesthesia and Seldinger technique, a 6-[place] sheath was introduced in the right

radial artery.  Selective right and left coronary angiography performed using 5-[place]

3.5 right and left Hudson catheter.  Multiple views of the coronary artery including

hemiaxial views obtained. From there, catheter and sheaths were removed.  Hemostasis

was obtained, deployment of TR band.  There was no immediate complication.  Patient is

returned to room in stable condition.  Of note, patient received intra-arterial

verapamil.  She has received Lovenox a few hours earlier on the floor.  There was no

immediate complication.



FINDINGS:

LEFT MAIN:  This is a large-sized vessel, bifurcating into left circumflex, left

anterior descending artery.  Left main coronary artery has no evidence of high-grade

stenosis.



EFT ANTERIOR DESCENDING ARTERY:  This is a large-sized vessel, reaching toward the apex

with a wraparound apex segment.  A stented segment in the proximal LAD is patent with

no evidence of restenosis.  The diagonal stent is patent.  The takeoff of the diagonal

branch has a 20% to 30% plaque.  The rest of the vessel has no high-grade stenosis.

There is a plaque in the mid LAD of about 20% in a calcified segment.  The rest of the

vessel has no high-grade stenosis.



LEFT CIRCUMFLEX:  This is a large nondominant vessel giving rise to a large obtuse

marginal branch.  The left circumflex as well as branches have no evidence of

obstructive coronary artery disease.



RIGHT CORONARY ARTERY:  This is a large vessel, dominant, bifurcating distally into PDA

and posterolateral segment and branches.  The mid right coronary artery has a 30%-40%

plaque in a calcified segment.  The rest of the vessel has no high-grade stenosis.



LEFT VENTRICULOGRAM:  Left ventriculogram is not performed.



CONCLUSION:

1. Patent stent in the left anterior descending artery and the diagonal branch with no

    evidence of restenosis.

2. Mild to moderate disease in the right coronary artery.



RECOMMENDATION:

In view of finding anatomy, I recommend continue medical therapy with aggressive risk

modifications being initiated. Those findings and recommendations were discussed with

the patient and her family who are in full understanding and agreement.



Duration of procedure is 14 minutes.





GONZALO / BIMALN: [id number] / Job#: [id number]

## 2019-08-02 NOTE — P.CRDCN
History of Present Illness


History of present illness: 


This is a pleasant 79-year-old  female past medical history significant

for coronary artery disease s/p stent placement. First in 2018 with stent 

placement to the first diagonal branch and second recently on  with stent 

placement to the mid-LAD. She states yesterday shortly after eating breakfast 

she felt a heavy burning sensation in the mid-sternal region. This was associa

hector with shortness of breath. She states in the past when she has required 

stenting she felt a similar type pain however, this time the shortness of breath

was much worse. This persisted for 2 hours at home with no relief. Upon arrival 

to ED she was given 3 SL nitro and her symptoms resolved. 





EKG reveals sinus mechanism heart rate 61 with poor R-wave progression. 


Chest xray is negative for an acute process. 


Laboratory data unremarkable. 


Current cardiac medications include aspirin 81 mg daily, plavix 75 mg daily, 

atorvastatin 40 mg daily, lasix 20 mg daily as needed for lower extremity swe

lling, imdur 30 mg daily and lopressor 25 mg BID. 





At the time of my exam:


CONSTITUTIONAL: Denies fever. Denies chills.


EYES: Denies blurred vision. Denies vision changes. Denies eye pain.


EARS, NOSE, MOUTH & THROAT: Denies headache. Denies sore throat. Denies ear 

pain.


CARDIOVASCULAR: Denies chest pain. Denies shortness of breath. Denies orthopnea.

Denies PND. Denies palpitations.


RESPIRATORY: Denies cough. 


GASTROINTESTINAL: Denies abdominal pain. Denies diarrhea. Denies constipation. 

Denies nausea. Denies vomiting.


MUSCULOSKELETAL: Denies myalgias.


INTEGUMENTARY: Denies pruitis. Denies rash.


NEUROLOGIC: Denies numbness. Denies tingling. Denies weakness.


PSYCHIATRIC: Denies anxiety. Denies depression.


ENDOCRINE: Denies fatigue. Denies weight change. Denies polydipsia. Denies 

polyurina.


GENITOURINARY: Denies burning, hematuria or urgency with micturation.


HEMATOLOGIC: Denies history of anemia. Denies bleeding. 





GENERAL: This is a 79-year-old  female in no apparent distress at the 

time of my examination.


HEENT: Head is atraumatic, normocephalic. Pupils are equal, round. Sclerae 

anicteric. Conjunctivae are clear. Mucous membranes of the mouth are moist. Neck

is supple. There is no jugular venous distention. No carotid bruit is heard.


LUNGS: Clear to auscultation no wheezes, rales or rhonchi. No chest wall 

tenderness is noted on palpation or with deep breathing.


HEART: Regular rate and rhythm without murmurs, rubs or gallops. S1 and S2 

heard.


ABDOMEN: Soft, nontender. Bowel sounds are heard. No organomegaly noted.


EXTREMITIES: No evidence of peripheral edema and no calf tenderness noted.


VASCULAR: Radial and dorsalis pedis pulses palpated, no evidence of clubbing.  


NEUROLOGIC: Patient is awake, alert and oriented x3.


 


ASSESSMENT


Chest pain suggestive of unstable angina with recent stent placement


Coronary artery disease status post stent placement 2019


Hypertension


Dyslipidemia





PLAN


Initiate on protonix and IV fluids. 


Continue to obtain serial cardiac enzymes to rule out an acute event. 


If she has ongoing chest discomfort we will recommend proceeding with cardiac 

catheterization. If her chest pain is resolved and her enzymes are normal she wi

ll undergo a stress test. This has been discussed in detail with the patient. 


Give one dose of Lovenox 50 mg SQ now. 


Thank you kindly for this consultation. 





Nurse Practitioner note has been reviewed, I agree with a documented findings 

and plan of care.  Patient was seen and examined.








Past Medical History


Past Medical History: Coronary Artery Disease (CAD), Cancer, Deep Vein 

Thrombosis (DVT), Eye Disorder, GERD/Reflux, Hyperlipidemia, Hypertension, Monty

cardial Infarction (MI), Mitral Valve Prolapse (MVP), Osteoarthritis (OA)


Additional Past Medical History / Comment(s): Hx of bowel obstruction 2018 

while in [place], hx of partial bowel obstruction 2019. Legal blindness due 

to glaucoma, neuropathy bilateral legs. Hx of uterine cancer , endometrial 

and cervical cancer treated with radiation Hx of DVT 2-3 yrs ago.


Last Myocardial Infarction Date:: 18


History of Any Multi-Drug Resistant Organisms: None Reported


Past Surgical History: Breast Surgery, Heart Catheterization With Stent, 

Hysterectomy


Additional Past Surgical History / Comment(s): Eye surgery, breast biopsy, 1 

cardiac stent.


Past Anesthesia/Blood Transfusion Reactions: No Reported Reaction


Additional Past Anesthesia/Blood Transfusion Reaction / Comment(s): Slow to wake


Date of Last Stent Placement:: 


Past Psychological History: No Psychological Hx Reported


Smoking Status: Never smoker


Past Alcohol Use History: None Reported


Past Drug Use History: None Reported





- Past Family History


  ** Mother


Family Medical History: Cancer, Renal Disease


Additional Family Medical History / Comment(s): Uterine CA.





  ** Brother(s)


Family Medical History: Cancer


Additional Family Medical History / Comment(s): Colon CA.





  ** Sister(s)


Family Medical History: Cancer


Additional Family Medical History / Comment(s): Colon and breast CA.





  ** Father


Family Medical History: Myocardial Infarction (MI)


Additional Family Medical History / Comment(s):  at 54 from MI.





Medications and Allergies


                                Home Medications











 Medication  Instructions  Recorded  Confirmed  Type


 


Gabapentin [Neurontin] 300 mg PO HS 18 History


 


Brimonidine Tartrate [Alphagan P 1 drops BOTH EYES TID 18 History





0.2% Ophth Soln]    


 


Dorzolamide/Timolol/Pf [Cosopt Pf 1 applicator BOTH EYES TID 18 

History





2%/5% Ophth Droperette]    


 


Furosemide [Lasix] 20 mg PO DAILY PRN 18 History


 


Atorvastatin [Lipitor] 40 mg PO DAILY #30 tab 18 Rx


 


Clopidogrel [Plavix] 75 mg PO DAILY #30 tab 18 Rx


 


Nitroglycerin Sl Tabs [Nitrostat] 0.4 mg SUBLINGUAL Q5M PRN #25 tab 18 Rx


 


Docusate Sodium [Dok] 100 mg PO HS 18 History


 


Famotidine [Pepcid] 20 mg PO DAILY PRN 19 History


 


Isosorbide Mononitrate ER [Imdur] 30 mg PO DAILY 19 History


 


Potassium Chloride [Klor-Con 20] 10 meq PO DAILY PRN 19 History


 


Calcium Carbonate/Vitamin D3 1 cap PO BID 19 History





[Calcium 600-Vit D3 500 Softgel]    


 


Aspirin EC [Ecotrin Low Dose] 81 mg PO HS 19 History


 


Metoprolol Tartrate [Lopressor] 25 mg PO BID@1200,2100 19 History








                                    Allergies











Allergy/AdvReac Type Severity Reaction Status Date / Time


 


ibuprofen [From Advil] AdvReac  Nausea & Verified 19 10:33





   Vomiting  














Physical Exam


Vitals: 


                                   Vital Signs











  Temp Pulse Pulse Pulse Resp BP BP


 


 19 13:08  97.5 F L    62  18   191/66


 


 19 12:35   62    18  172/73 


 


 19 12:06   66    18  148/62 


 


 19 10:54   63    18  168/82 


 


 19 10:32    61    


 


 19 10:08  98 F  74    18  173/71 














  Pulse Ox


 


 19 13:08  100


 


 19 12:35  99


 


 19 12:06  100


 


 19 10:54  97


 


 19 10:32 


 


 19 10:08  99








                                Intake and Output











 19





 06:59 14:59 22:59


 


Other:   


 


  Weight  51.256 kg 














Results





                                 19 10:20





                                 19 10:20


                                 Cardiac Enzymes











  19 Range/Units





  10:20 10:20 


 


AST  26   (14-36)  U/L


 


Troponin I   <0.012  (0.000-0.034)  ng/mL








                                   Coagulation











  19 Range/Units





  10:20 


 


PT  9.9  (9.0-12.0)  sec


 


APTT  18.9 L  (22.0-30.0)  sec








                                       CBC











  19 Range/Units





  10:20 


 


WBC  4.7  (3.8-10.6)  k/uL


 


RBC  4.21  (3.80-5.40)  m/uL


 


Hgb  12.3  (11.4-16.0)  gm/dL


 


Hct  39.7  (34.0-46.0)  %


 


Plt Count  197  (150-450)  k/uL








                          Comprehensive Metabolic Panel











  19 Range/Units





  10:20 


 


Sodium  143  (137-145)  mmol/L


 


Potassium  3.7  (3.5-5.1)  mmol/L


 


Chloride  108 H  ()  mmol/L


 


Carbon Dioxide  26  (22-30)  mmol/L


 


BUN  17  (7-17)  mg/dL


 


Creatinine  0.74  (0.52-1.04)  mg/dL


 


Glucose  107 H  (74-99)  mg/dL


 


Calcium  9.3  (8.4-10.2)  mg/dL


 


AST  26  (14-36)  U/L


 


ALT  19  (9-52)  U/L


 


Alkaline Phosphatase  71  ()  U/L


 


Total Protein  6.5  (6.3-8.2)  g/dL


 


Albumin  3.6  (3.5-5.0)  g/dL








                               Current Medications











Generic Name Dose Route Start Last Admin





  Trade Name Freq  PRN Reason Stop Dose Admin


 


Aspirin  81 mg  19 21:00 





  Aspirin  PO  





  HS [facility]  


 


Atorvastatin Calcium  40 mg  19 09:00 





  Lipitor  PO  





  DAILY [facility]  


 


Brimonidine Tartrate  1 drops  19 16:00 





  Alphagan P 0.2% Ophth Soln  BOTH EYES  





  TID [facility]  


 


Clopidogrel Bisulfate  75 mg  19 09:00 





  Plavix  PO  





  DAILY [facility]  


 


Docusate Sodium  100 mg  19 21:00 





  Colace  PO  





  HS [facility]  


 


Dorzolamide/Timolol  1 drops  19 16:00 





  Cosopt  BOTH EYES  





  TID [facility]  


 


Famotidine  20 mg  19 14:16 





  Pepcid  PO  





  DAILY PRN  





  gerd  


 


Gabapentin  300 mg  19 21:00 





  Neurontin  PO  





  HS [facility]  


 


Lisinopril  5 mg  19 17:30 





  Zestril  PO  





  W/SUPPER [facility]  


 


Metoprolol Tartrate  25 mg  19 21:00 





  Lopressor  PO  





  BID@1200,2100 [facility]  


 


Nitroglycerin  1 inch  19 12:30  19 12:35





  Nitro-Bid Oint  TOPICAL   1 inch





  Q6HR [facility]   Administration


 


Nitroglycerin  0.4 mg  19 12:08 





  Nitrostat  SUBLINGUAL  





  Q5M PRN  





  Chest Pain  


 


Potassium Chloride  10 meq  19 14:16 





  K-Dur 10  PO  





  DAILY PRN  





  Edema  


 


Sodium Chloride  10 ml  19 21:00 





  Saline Flush  IV  





  BID [facility]  








                                Intake and Output











 19





 06:59 14:59 22:59


 


Other:   


 


  Weight  51.256 kg 








                                 Patient Weight











 19





 06:59


 


Weight 51.256 kg








                                        





                                 19 10:20 





                                 19 10:20

## 2019-10-15 ENCOUNTER — HOSPITAL ENCOUNTER (OUTPATIENT)
Dept: HOSPITAL 47 - LABWHC1 | Age: 79
Discharge: HOME | End: 2019-10-15
Attending: INTERNAL MEDICINE
Payer: MEDICARE

## 2019-10-15 DIAGNOSIS — E78.2: Primary | ICD-10-CM

## 2019-10-15 LAB
ALBUMIN SERPL-MCNC: 4 G/DL (ref 3.8–4.9)
ALBUMIN/GLOB SERPL: 2 G/DL (ref 1.6–3.17)
ALP SERPL-CCNC: 84 U/L (ref 41–126)
ALT SERPL-CCNC: 22 U/L (ref 8–44)
ANION GAP SERPL CALC-SCNC: 7.7 MMOL/L (ref 4–12)
AST SERPL-CCNC: 25 U/L (ref 13–35)
BUN SERPL-SCNC: 16 MG/DL (ref 9–27)
BUN/CREAT SERPL: 22.86 RATIO (ref 12–20)
CALCIUM SPEC-MCNC: 9.2 MG/DL (ref 8.7–10.3)
CHLORIDE SERPL-SCNC: 110 MMOL/L (ref 96–109)
CHOLEST SERPL-MCNC: 124 MG/DL (ref 0–200)
CO2 SERPL-SCNC: 30.3 MMOL/L (ref 21.6–31.8)
GLOBULIN SER CALC-MCNC: 2 G/DL (ref 1.6–3.3)
GLUCOSE SERPL-MCNC: 94 MG/DL (ref 70–110)
HDLC SERPL-MCNC: 48 MG/DL (ref 40–60)
LDLC SERPL CALC-MCNC: 54.8 MG/DL (ref 0–131)
POTASSIUM SERPL-SCNC: 4.5 MMOL/L (ref 3.5–5.5)
PROT SERPL-MCNC: 6 G/DL (ref 6.2–8.2)
SODIUM SERPL-SCNC: 148 MMOL/L (ref 135–145)
TRIGL SERPL-MCNC: 106 MG/DL (ref 0–149)
VLDLC SERPL CALC-MCNC: 21.2 MG/DL (ref 5–40)

## 2019-10-15 PROCEDURE — 80053 COMPREHEN METABOLIC PANEL: CPT

## 2019-10-15 PROCEDURE — 36415 COLL VENOUS BLD VENIPUNCTURE: CPT

## 2019-10-15 PROCEDURE — 80061 LIPID PANEL: CPT

## 2019-10-23 ENCOUNTER — HOSPITAL ENCOUNTER (EMERGENCY)
Dept: HOSPITAL 47 - EC | Age: 79
LOS: 1 days | Discharge: HOME | End: 2019-10-24
Payer: MEDICARE

## 2019-10-23 VITALS — RESPIRATION RATE: 18 BRPM

## 2019-10-23 VITALS — TEMPERATURE: 97.9 F

## 2019-10-23 DIAGNOSIS — Z79.899: ICD-10-CM

## 2019-10-23 DIAGNOSIS — K21.9: ICD-10-CM

## 2019-10-23 DIAGNOSIS — I25.119: ICD-10-CM

## 2019-10-23 DIAGNOSIS — Z79.02: ICD-10-CM

## 2019-10-23 DIAGNOSIS — Z85.41: ICD-10-CM

## 2019-10-23 DIAGNOSIS — Z85.42: ICD-10-CM

## 2019-10-23 DIAGNOSIS — N93.9: ICD-10-CM

## 2019-10-23 DIAGNOSIS — I34.1: ICD-10-CM

## 2019-10-23 DIAGNOSIS — Z88.6: ICD-10-CM

## 2019-10-23 DIAGNOSIS — M19.90: ICD-10-CM

## 2019-10-23 DIAGNOSIS — E78.5: ICD-10-CM

## 2019-10-23 DIAGNOSIS — I25.2: ICD-10-CM

## 2019-10-23 DIAGNOSIS — N39.0: Primary | ICD-10-CM

## 2019-10-23 DIAGNOSIS — G62.9: ICD-10-CM

## 2019-10-23 DIAGNOSIS — Z86.718: ICD-10-CM

## 2019-10-23 DIAGNOSIS — Z95.5: ICD-10-CM

## 2019-10-23 DIAGNOSIS — I10: ICD-10-CM

## 2019-10-23 DIAGNOSIS — Z79.82: ICD-10-CM

## 2019-10-23 DIAGNOSIS — H54.8: ICD-10-CM

## 2019-10-23 DIAGNOSIS — K56.600: ICD-10-CM

## 2019-10-23 DIAGNOSIS — R53.1: ICD-10-CM

## 2019-10-23 LAB
ALBUMIN SERPL-MCNC: 4.1 G/DL (ref 3.5–5)
ALP SERPL-CCNC: 98 U/L (ref 38–126)
ALT SERPL-CCNC: 27 U/L (ref 9–52)
ANION GAP SERPL CALC-SCNC: 9 MMOL/L
APTT BLD: 22.5 SEC (ref 22–30)
AST SERPL-CCNC: 25 U/L (ref 14–36)
BASOPHILS # BLD AUTO: 0 K/UL (ref 0–0.2)
BASOPHILS NFR BLD AUTO: 1 %
BUN SERPL-SCNC: 18 MG/DL (ref 7–17)
CALCIUM SPEC-MCNC: 9.8 MG/DL (ref 8.4–10.2)
CHLORIDE SERPL-SCNC: 105 MMOL/L (ref 98–107)
CK SERPL-CCNC: 40 U/L (ref 30–135)
CO2 SERPL-SCNC: 28 MMOL/L (ref 22–30)
EOSINOPHIL # BLD AUTO: 0.1 K/UL (ref 0–0.7)
EOSINOPHIL NFR BLD AUTO: 2 %
ERYTHROCYTE [DISTWIDTH] IN BLOOD BY AUTOMATED COUNT: 4.59 M/UL (ref 3.8–5.4)
ERYTHROCYTE [DISTWIDTH] IN BLOOD: 14.4 % (ref 11.5–15.5)
GLUCOSE SERPL-MCNC: 108 MG/DL (ref 74–99)
HCT VFR BLD AUTO: 43.7 % (ref 34–46)
HGB BLD-MCNC: 14 GM/DL (ref 11.4–16)
INR PPP: 0.9 (ref ?–1.2)
LYMPHOCYTES # SPEC AUTO: 0.9 K/UL (ref 1–4.8)
LYMPHOCYTES NFR SPEC AUTO: 15 %
MAGNESIUM SPEC-SCNC: 2 MG/DL (ref 1.6–2.3)
MCH RBC QN AUTO: 30.5 PG (ref 25–35)
MCHC RBC AUTO-ENTMCNC: 32.1 G/DL (ref 31–37)
MCV RBC AUTO: 95.1 FL (ref 80–100)
MONOCYTES # BLD AUTO: 0.4 K/UL (ref 0–1)
MONOCYTES NFR BLD AUTO: 7 %
NEUTROPHILS # BLD AUTO: 4.6 K/UL (ref 1.3–7.7)
NEUTROPHILS NFR BLD AUTO: 75 %
PH UR: 7 [PH] (ref 5–8)
PLATELET # BLD AUTO: 228 K/UL (ref 150–450)
POTASSIUM SERPL-SCNC: 3.9 MMOL/L (ref 3.5–5.1)
PROT SERPL-MCNC: 7.1 G/DL (ref 6.3–8.2)
PROT UR QL: (no result)
PT BLD: 9.7 SEC (ref 9–12)
RBC UR QL: 56 /HPF (ref 0–5)
SODIUM SERPL-SCNC: 142 MMOL/L (ref 137–145)
SP GR UR: 1.01 (ref 1–1.03)
UROBILINOGEN UR QL STRIP: <2 MG/DL (ref ?–2)
WBC # BLD AUTO: 6.2 K/UL (ref 3.8–10.6)

## 2019-10-23 PROCEDURE — 99285 EMERGENCY DEPT VISIT HI MDM: CPT

## 2019-10-23 PROCEDURE — 85025 COMPLETE CBC W/AUTO DIFF WBC: CPT

## 2019-10-23 PROCEDURE — 84484 ASSAY OF TROPONIN QUANT: CPT

## 2019-10-23 PROCEDURE — 80053 COMPREHEN METABOLIC PANEL: CPT

## 2019-10-23 PROCEDURE — 85610 PROTHROMBIN TIME: CPT

## 2019-10-23 PROCEDURE — 82550 ASSAY OF CK (CPK): CPT

## 2019-10-23 PROCEDURE — 81001 URINALYSIS AUTO W/SCOPE: CPT

## 2019-10-23 PROCEDURE — 96374 THER/PROPH/DIAG INJ IV PUSH: CPT

## 2019-10-23 PROCEDURE — 93005 ELECTROCARDIOGRAM TRACING: CPT

## 2019-10-23 PROCEDURE — 71275 CT ANGIOGRAPHY CHEST: CPT

## 2019-10-23 PROCEDURE — 36415 COLL VENOUS BLD VENIPUNCTURE: CPT

## 2019-10-23 PROCEDURE — 85730 THROMBOPLASTIN TIME PARTIAL: CPT

## 2019-10-23 PROCEDURE — 87040 BLOOD CULTURE FOR BACTERIA: CPT

## 2019-10-23 PROCEDURE — 83735 ASSAY OF MAGNESIUM: CPT

## 2019-10-23 PROCEDURE — 96361 HYDRATE IV INFUSION ADD-ON: CPT

## 2019-10-23 PROCEDURE — 74177 CT ABD & PELVIS W/CONTRAST: CPT

## 2019-10-23 PROCEDURE — 87086 URINE CULTURE/COLONY COUNT: CPT

## 2019-10-23 PROCEDURE — 83605 ASSAY OF LACTIC ACID: CPT

## 2019-10-23 PROCEDURE — 84100 ASSAY OF PHOSPHORUS: CPT

## 2019-10-23 NOTE — CT
EXAMINATION TYPE: CT angio chest

 

DATE OF EXAM: 10/23/2019 11:30 PM

 

COMPARISON: 6/1/2018

 

HISTORY: pain

 

CT DLP: 251.6 mGycm

Automated exposure control for dose reduction was used.

 

CONTRAST: 

CTA scan of the thorax is performed with IV Contrast, patient injected with 100 mL of Isovue 370, pul
monary embolism protocol. .  

There are 3-D post processed images.

FINDINGS:

 

There is some pleural thickening at the lung apices. There is no pleural effusion. There is no eviden
ce of a pulmonary mass. Heart size is normal. There is no pericardial effusion. There is normal contr
ast opacification of the pulmonary arteries. There are no filling defects.

 

There is no mediastinal adenopathy. There are no hilar masses. There is no sign of aortic aneurysm or
 dissection. Ascending aorta measures 3 cm. Thoracic vertebra show no compression fracture. There is 
mild thoracic dextroscoliosis. There is thoracolumbar levoscoliosis.

 

IMPRESSION: 

NO EVIDENCE OF PULMONARY EMBOLISM. THERE IS PLEURAL AND PULMONARY SCARRING AT THE LUNG APICES UNCHANG
ED.

## 2019-10-23 NOTE — CT
EXAMINATION TYPE: CT abdomen pelvis w con

 

DATE OF EXAM: 10/23/2019

 

COMPARISON: 6/3/2019

 

HISTORY: pain

 

CT DLP: 712.7 mGycm

Automated exposure control for dose reduction was used.

 

TECHNIQUE:  Helical acquisition of images was performed from the lung bases through the pelvis.

 

CONTRAST: 

Performed without Oral Contrast and with IV Contrast, patient injected with 100 mL of Isovue 370.

 

FINDINGS: 

 

Liver and spleen appear normal. Bile ducts are not dilated. There is no pancreatic mass. Stomach appe
ars intact. Gallbladder is contracted.

 

There is no adrenal mass. Kidneys have normal size. There is possible 5 mm calculus lower pole right 
kidney. There is some contrast pre-existing in the kidneys that makes evaluation for calculi difficul
t. There is no retroperitoneal adenopathy. Abdominal aorta is atheromatous. Bladder distends smoothly
. Urinary bladder is large and measures 12 cm in height. There is no free fluid in the pelvis. There 
are surgical clips at the floor the pelvis.

 

There is no inguinal hernia. There is no sign of a bowel obstruction. There is hysterectomy. There is
 no mesenteric edema. There is no ascites. There is mild thoracolumbar levoscoliosis with spondylotic
 changes. I see no bony destructive process. Bony pelvis is intact.

IMPRESSION: 

SMALL CALCULUS LOWER POLE RIGHT KIDNEY UNCHANGED. NO HYDRONEPHROSIS. MILDLY DILATED URINARY BLADDER C
OULD RELATE TO BLADDER OUTLET OBSTRUCTION.

## 2019-10-23 NOTE — ED
Female Urogenital HPI





- General


Chief complaint: Urogenital


Stated complaint: vaginal bleeding


Time Seen by Provider: 10/23/19 21:00


Source: patient, RN notes reviewed, old records reviewed


Mode of arrival: ambulatory


Limitations: no limitations





- History of Present Illness


Initial comments: 





This is a 71-year-old female the ER she presents today for evaluation regarding 

not feeling well abdominal pain and pain with urination.  Patient recently have 

urinary tract infection patient has increasing weakness not feeling well and has

not been feeling well all day decreased appetite.  Patient denies any fevers 

does admit to abdominal pain


MD Complaint: vaginal bleeding, dysuria


-: hour(s)


Location: suprapubic


Radiation: suprapubic


Severity: mild


Severity scale (1-10): 3


Improves with: urination


Worsens with: none


Patient Pregnant: No


Associated Symptoms: abdominal pain, nausea/vomiting





- Related Data


                                Home Medications











 Medication  Instructions  Recorded  Confirmed


 


Gabapentin [Neurontin] 300 mg PO HS 01/16/18 10/23/19


 


Brimonidine Tartrate [Alphagan P 1 drop BOTH EYES TID 04/13/18 10/23/19





0.2% Ophth Soln]   


 


Dorzolamide/Timolol/Pf [Cosopt Pf 1 drop BOTH EYES TID 04/13/18 10/23/19





2%/5% Ophth Droperette]   


 


Furosemide [Lasix] 20 mg PO DAILY PRN 04/13/18 10/23/19


 


Docusate Sodium [Dok] 100 mg PO HS 09/09/18 10/23/19


 


Famotidine [Pepcid] 20 mg PO DAILY PRN 03/26/19 10/23/19


 


Isosorbide Mononitrate ER [Imdur] 30 mg PO DAILY 03/26/19 10/23/19


 


Potassium Chloride [Klor-Con 20] 10 meq PO DAILY PRN 04/03/19 10/23/19


 


Calcium Carbonate/Vitamin D3 1 cap PO BID 04/28/19 10/23/19





[Calcium 600-Vit D3 500 Softgel]   


 


Aspirin EC [Ecotrin Low Dose] 81 mg PO HS 08/01/19 10/23/19


 


Metoprolol Tartrate [Lopressor] 25 mg PO BID@1200,2100 08/01/19 10/23/19








                                  Previous Rx's











 Medication  Instructions  Recorded


 


Atorvastatin [Lipitor] 40 mg PO DAILY #30 tab 18


 


Clopidogrel [Plavix] 75 mg PO DAILY #30 tab 18


 


Nitroglycerin Sl Tabs [Nitrostat] 0.4 mg SUBLINGUAL Q5M PRN #25 tab 18


 


Cephalexin [Keflex] 500 mg PO Q6HR 3 Days #12 cap 10/24/19











                                    Allergies











Allergy/AdvReac Type Severity Reaction Status Date / Time


 


ibuprofen [From Advil] AdvReac  Nausea & Verified 10/23/19 22:16





   Vomiting  














Review of Systems


ROS Statement: 


Those systems with pertinent positive or pertinent negative responses have been 

documented in the HPI.





ROS Other: All systems not noted in ROS Statement are negative.





Past Medical History


Past Medical History: Coronary Artery Disease (CAD), Cancer, Deep Vein 

Thrombosis (DVT), Eye Disorder, GERD/Reflux, Hyperlipidemia, Hypertension, 

Myocardial Infarction (MI), Mitral Valve Prolapse (MVP), Osteoarthritis (OA)


Additional Past Medical History / Comment(s): Hx of bowel obstruction 2018 

while in [place], hx of partial bowel obstruction 2019. Legal blindness due 

to glaucoma, neuropathy bilateral legs. Hx of uterine cancer , endometrial 

and cervical cancer treated with radiation Hx of DVT 2-3 yrs ago.


Last Myocardial Infarction Date:: 18


History of Any Multi-Drug Resistant Organisms: None Reported


Past Surgical History: Breast Surgery, Heart Catheterization With Stent, 

Hysterectomy


Additional Past Surgical History / Comment(s): Eye surgery, breast biopsy, 1 

cardiac stent.


Past Anesthesia/Blood Transfusion Reactions: No Reported Reaction


Additional Past Anesthesia/Blood Transfusion Reaction / Comment(s): Slow to wake


Date of Last Stent Placement:: 


Past Psychological History: No Psychological Hx Reported


Smoking Status: Never smoker


Past Alcohol Use History: None Reported


Past Drug Use History: None Reported





- Past Family History


  ** Mother


Family Medical History: Cancer, Renal Disease


Additional Family Medical History / Comment(s): Uterine CA.





  ** Brother(s)


Family Medical History: Cancer


Additional Family Medical History / Comment(s): Colon CA.





  ** Sister(s)


Family Medical History: Cancer


Additional Family Medical History / Comment(s): Colon and breast CA.





  ** Father


Family Medical History: Myocardial Infarction (MI)


Additional Family Medical History / Comment(s):  at 54 from MI.





General Exam


Limitations: no limitations


General appearance: alert, in no apparent distress, cachectic


Head exam: Present: atraumatic, normocephalic, normal inspection


Eye exam: Present: normal appearance, EOMI.  Absent: scleral icterus, 

conjunctival injection, periorbital swelling


ENT exam: Present: normal exam, mucous membranes moist


Neck exam: Present: normal inspection.  Absent: tenderness, meningismus, lymph

adenopathy


Respiratory exam: Present: normal lung sounds bilaterally.  Absent: respiratory 

distress, wheezes, rales, rhonchi, stridor


Cardiovascular Exam: Present: regular rate, normal rhythm, normal heart sounds. 

Absent: systolic murmur, diastolic murmur, rubs, gallop, clicks


GI/Abdominal exam: Present: soft, normal bowel sounds.  Absent: distended, 

tenderness, guarding, rebound, rigid


Extremities exam: Present: normal inspection, full ROM, normal capillary refill.

 Absent: tenderness, pedal edema, joint swelling, calf tenderness


Back exam: Present: normal inspection


Neurological exam: Present: alert, oriented X3, CN II-XII intact


Psychiatric exam: Present: normal affect, normal mood


Skin exam: Present: warm, dry, intact, normal color.  Absent: rash





Course


                                   Vital Signs











  10/23/19 10/23/19 10/23/19





  20:52 22:04 22:22


 


Temperature 98.6 F  97.9 F


 


Pulse Rate 70 79 


 


Respiratory 18 18 





Rate   


 


Blood Pressure 201/77 196/78 


 


O2 Sat by Pulse 99 100 





Oximetry   














  10/23/19 10/23/19





  22:56 23:45


 


Temperature  


 


Pulse Rate 77 86


 


Respiratory 18 18





Rate  


 


Blood Pressure 205/103 199/96


 


O2 Sat by Pulse 100 100





Oximetry  














- Reevaluation(s)


Reevaluation #1: 





10/23/19 21:47


Record is reviewed


Reevaluation #2: 





10/23/19 21:47


She is able to eat and drink





Medical Decision Making





- Medical Decision Making





71-year-old female the ER for evaluation urinary urgency, patient having mild 

abdominal pain, CTs are negative labwork is normal urine is negative.  Patient 

will be discharged return to ER if symptoms persist or worsen





- Lab Data


Result diagrams: 


                                 10/23/19 22:16





                                 10/23/19 22:16


                                   Lab Results











  10/23/19 10/23/19 10/23/19 Range/Units





  21:10 22:16 22:16 


 


WBC   6.2   (3.8-10.6)  k/uL


 


RBC   4.59   (3.80-5.40)  m/uL


 


Hgb   14.0   (11.4-16.0)  gm/dL


 


Hct   43.7   (34.0-46.0)  %


 


MCV   95.1   (80.0-100.0)  fL


 


MCH   30.5   (25.0-35.0)  pg


 


MCHC   32.1   (31.0-37.0)  g/dL


 


RDW   14.4   (11.5-15.5)  %


 


Plt Count   228   (150-450)  k/uL


 


Neutrophils %   75   %


 


Lymphocytes %   15   %


 


Monocytes %   7   %


 


Eosinophils %   2   %


 


Basophils %   1   %


 


Neutrophils #   4.6   (1.3-7.7)  k/uL


 


Lymphocytes #   0.9 L   (1.0-4.8)  k/uL


 


Monocytes #   0.4   (0-1.0)  k/uL


 


Eosinophils #   0.1   (0-0.7)  k/uL


 


Basophils #   0.0   (0-0.2)  k/uL


 


PT     (9.0-12.0)  sec


 


INR     (<1.2)  


 


APTT     (22.0-30.0)  sec


 


Sodium    142  (137-145)  mmol/L


 


Potassium    3.9  (3.5-5.1)  mmol/L


 


Chloride    105  ()  mmol/L


 


Carbon Dioxide    28  (22-30)  mmol/L


 


Anion Gap    9  mmol/L


 


BUN    18 H  (7-17)  mg/dL


 


Creatinine    0.77  (0.52-1.04)  mg/dL


 


Est GFR (CKD-EPI)AfAm    86  (>60 ml/min/1.73 sqM)  


 


Est GFR (CKD-EPI)NonAf    74  (>60 ml/min/1.73 sqM)  


 


Glucose    108 H  (74-99)  mg/dL


 


Plasma Lactic Acid Zia     (0.7-2.0)  mmol/L


 


Calcium    9.8  (8.4-10.2)  mg/dL


 


Phosphorus    4.3  (2.5-4.5)  mg/dL


 


Magnesium    2.0  (1.6-2.3)  mg/dL


 


Total Bilirubin    0.7  (0.2-1.3)  mg/dL


 


AST    25  (14-36)  U/L


 


ALT    27  (9-52)  U/L


 


Alkaline Phosphatase    98  ()  U/L


 


Creatine Kinase    40  ()  U/L


 


Troponin I     (0.000-0.034)  ng/mL


 


Total Protein    7.1  (6.3-8.2)  g/dL


 


Albumin    4.1  (3.5-5.0)  g/dL


 


Urine Color  Red    


 


Urine Appearance  Cloudy H    (Clear)  


 


Urine pH  7.0    (5.0-8.0)  


 


Ur Specific Gravity  1.007    (1.001-1.035)  


 


Urine Protein  2+ H    (Negative)  


 


Urine Glucose (UA)  Negative    (Negative)  


 


Urine Ketones  Negative    (Negative)  


 


Urine Blood  Large H    (Negative)  


 


Urine Nitrite  Negative    (Negative)  


 


Urine Bilirubin  Negative    (Negative)  


 


Urine Urobilinogen  <2.0    (<2.0)  mg/dL


 


Ur Leukocyte Esterase  Moderate H    (Negative)  


 


Urine RBC  56 H    (0-5)  /hpf


 


Urine WBC  26 H    (0-5)  /hpf


 


Urine Bacteria  Occasional H    (None)  /hpf


 


Urine Mucus  Rare H    (None)  /hpf














  10/23/19 10/23/19 10/23/19 Range/Units





  22:16 22:16 22:16 


 


WBC     (3.8-10.6)  k/uL


 


RBC     (3.80-5.40)  m/uL


 


Hgb     (11.4-16.0)  gm/dL


 


Hct     (34.0-46.0)  %


 


MCV     (80.0-100.0)  fL


 


MCH     (25.0-35.0)  pg


 


MCHC     (31.0-37.0)  g/dL


 


RDW     (11.5-15.5)  %


 


Plt Count     (150-450)  k/uL


 


Neutrophils %     %


 


Lymphocytes %     %


 


Monocytes %     %


 


Eosinophils %     %


 


Basophils %     %


 


Neutrophils #     (1.3-7.7)  k/uL


 


Lymphocytes #     (1.0-4.8)  k/uL


 


Monocytes #     (0-1.0)  k/uL


 


Eosinophils #     (0-0.7)  k/uL


 


Basophils #     (0-0.2)  k/uL


 


PT   9.7   (9.0-12.0)  sec


 


INR   0.9   (<1.2)  


 


APTT   22.5   (22.0-30.0)  sec


 


Sodium     (137-145)  mmol/L


 


Potassium     (3.5-5.1)  mmol/L


 


Chloride     ()  mmol/L


 


Carbon Dioxide     (22-30)  mmol/L


 


Anion Gap     mmol/L


 


BUN     (7-17)  mg/dL


 


Creatinine     (0.52-1.04)  mg/dL


 


Est GFR (CKD-EPI)AfAm     (>60 ml/min/1.73 sqM)  


 


Est GFR (CKD-EPI)NonAf     (>60 ml/min/1.73 sqM)  


 


Glucose     (74-99)  mg/dL


 


Plasma Lactic Acid Zia  1.5    (0.7-2.0)  mmol/L


 


Calcium     (8.4-10.2)  mg/dL


 


Phosphorus     (2.5-4.5)  mg/dL


 


Magnesium     (1.6-2.3)  mg/dL


 


Total Bilirubin     (0.2-1.3)  mg/dL


 


AST     (14-36)  U/L


 


ALT     (9-52)  U/L


 


Alkaline Phosphatase     ()  U/L


 


Creatine Kinase     ()  U/L


 


Troponin I    <0.012  (0.000-0.034)  ng/mL


 


Total Protein     (6.3-8.2)  g/dL


 


Albumin     (3.5-5.0)  g/dL


 


Urine Color     


 


Urine Appearance     (Clear)  


 


Urine pH     (5.0-8.0)  


 


Ur Specific Gravity     (1.001-1.035)  


 


Urine Protein     (Negative)  


 


Urine Glucose (UA)     (Negative)  


 


Urine Ketones     (Negative)  


 


Urine Blood     (Negative)  


 


Urine Nitrite     (Negative)  


 


Urine Bilirubin     (Negative)  


 


Urine Urobilinogen     (<2.0)  mg/dL


 


Ur Leukocyte Esterase     (Negative)  


 


Urine RBC     (0-5)  /hpf


 


Urine WBC     (0-5)  /hpf


 


Urine Bacteria     (None)  /hpf


 


Urine Mucus     (None)  /hpf














- EKG Data


-: EKG Interpreted by Me (EKG shows sinus rhythm rate of 69, PA 1:30, QRS 80, 

)





- Radiology Data


Radiology results: report reviewed (chest CT abdomen pelvis negative for acute 

disease), image reviewed





Disposition


Clinical Impression: 


 Urinary tract infection, Weakness





Disposition: HOME SELF-CARE


Condition: Good


Instructions (If sedation given, give patient instructions):  Urinary Tract 

Infection in Women (ED)


Prescriptions: 


Cephalexin [Keflex] 500 mg PO Q6HR 3 Days #12 cap


Is patient prescribed a controlled substance at d/c from ED?: No


Referrals: 


Ashwin Reynoso DO [Primary Care Provider] - 1-2 days

## 2019-10-24 VITALS — HEART RATE: 72 BPM | SYSTOLIC BLOOD PRESSURE: 179 MMHG | DIASTOLIC BLOOD PRESSURE: 81 MMHG

## 2020-02-18 ENCOUNTER — HOSPITAL ENCOUNTER (OUTPATIENT)
Dept: HOSPITAL 47 - LABWHC1 | Age: 80
Discharge: HOME | End: 2020-02-18
Attending: INTERNAL MEDICINE
Payer: MEDICARE

## 2020-02-18 DIAGNOSIS — E78.2: Primary | ICD-10-CM

## 2020-02-18 LAB
ALBUMIN SERPL-MCNC: 3.9 G/DL (ref 3.8–4.9)
ALBUMIN/GLOB SERPL: 2.05 G/DL (ref 1.6–3.17)
ALP SERPL-CCNC: 85 U/L (ref 41–126)
ALT SERPL-CCNC: 22 U/L (ref 8–44)
ANION GAP SERPL CALC-SCNC: 6.7 MMOL/L (ref 4–12)
AST SERPL-CCNC: 23 U/L (ref 13–35)
BUN SERPL-SCNC: 15 MG/DL (ref 9–27)
BUN/CREAT SERPL: 21.43 RATIO (ref 12–20)
CALCIUM SPEC-MCNC: 9.1 MG/DL (ref 8.7–10.3)
CHLORIDE SERPL-SCNC: 109 MMOL/L (ref 96–109)
CHOLEST SERPL-MCNC: 126 MG/DL (ref 0–200)
CO2 SERPL-SCNC: 30.3 MMOL/L (ref 21.6–31.8)
GLOBULIN SER CALC-MCNC: 1.9 G/DL (ref 1.6–3.3)
GLUCOSE SERPL-MCNC: 93 MG/DL (ref 70–110)
HDLC SERPL-MCNC: 54 MG/DL (ref 40–60)
LDLC SERPL CALC-MCNC: 58.2 MG/DL (ref 0–131)
POTASSIUM SERPL-SCNC: 4 MMOL/L (ref 3.5–5.5)
PROT SERPL-MCNC: 5.8 G/DL (ref 6.2–8.2)
SODIUM SERPL-SCNC: 146 MMOL/L (ref 135–145)
TRIGL SERPL-MCNC: 69 MG/DL (ref 0–149)
VLDLC SERPL CALC-MCNC: 13.8 MG/DL (ref 5–40)

## 2020-02-18 PROCEDURE — 80061 LIPID PANEL: CPT

## 2020-02-18 PROCEDURE — 80053 COMPREHEN METABOLIC PANEL: CPT

## 2020-02-18 PROCEDURE — 36415 COLL VENOUS BLD VENIPUNCTURE: CPT

## 2020-03-03 ENCOUNTER — HOSPITAL ENCOUNTER (INPATIENT)
Dept: HOSPITAL 47 - EC | Age: 80
LOS: 4 days | DRG: 330 | End: 2020-03-07
Attending: SURGERY | Admitting: SURGERY
Payer: MEDICARE

## 2020-03-03 VITALS — BODY MASS INDEX: 20.2 KG/M2

## 2020-03-03 DIAGNOSIS — I10: ICD-10-CM

## 2020-03-03 DIAGNOSIS — K56.51: ICD-10-CM

## 2020-03-03 DIAGNOSIS — Z66: ICD-10-CM

## 2020-03-03 DIAGNOSIS — I25.10: ICD-10-CM

## 2020-03-03 DIAGNOSIS — Z79.899: ICD-10-CM

## 2020-03-03 DIAGNOSIS — Z88.6: ICD-10-CM

## 2020-03-03 DIAGNOSIS — I95.9: ICD-10-CM

## 2020-03-03 DIAGNOSIS — Z82.49: ICD-10-CM

## 2020-03-03 DIAGNOSIS — N20.0: ICD-10-CM

## 2020-03-03 DIAGNOSIS — I34.1: ICD-10-CM

## 2020-03-03 DIAGNOSIS — E78.5: ICD-10-CM

## 2020-03-03 DIAGNOSIS — K52.0: Primary | ICD-10-CM

## 2020-03-03 DIAGNOSIS — Z79.02: ICD-10-CM

## 2020-03-03 DIAGNOSIS — H40.9: ICD-10-CM

## 2020-03-03 DIAGNOSIS — Z86.718: ICD-10-CM

## 2020-03-03 DIAGNOSIS — Z85.42: ICD-10-CM

## 2020-03-03 DIAGNOSIS — Z92.3: ICD-10-CM

## 2020-03-03 DIAGNOSIS — Z80.49: ICD-10-CM

## 2020-03-03 DIAGNOSIS — Z95.5: ICD-10-CM

## 2020-03-03 DIAGNOSIS — I25.2: ICD-10-CM

## 2020-03-03 DIAGNOSIS — Z90.710: ICD-10-CM

## 2020-03-03 DIAGNOSIS — Z51.5: ICD-10-CM

## 2020-03-03 DIAGNOSIS — Z80.0: ICD-10-CM

## 2020-03-03 DIAGNOSIS — Z85.41: ICD-10-CM

## 2020-03-03 DIAGNOSIS — I49.01: ICD-10-CM

## 2020-03-03 DIAGNOSIS — H54.8: ICD-10-CM

## 2020-03-03 DIAGNOSIS — Z80.3: ICD-10-CM

## 2020-03-03 DIAGNOSIS — I49.5: ICD-10-CM

## 2020-03-03 DIAGNOSIS — I46.9: ICD-10-CM

## 2020-03-03 DIAGNOSIS — M15.9: ICD-10-CM

## 2020-03-03 DIAGNOSIS — N30.40: ICD-10-CM

## 2020-03-03 LAB
ALBUMIN SERPL-MCNC: 4.1 G/DL (ref 3.5–5)
ALP SERPL-CCNC: 88 U/L (ref 38–126)
ALT SERPL-CCNC: 25 U/L (ref 4–34)
ANION GAP SERPL CALC-SCNC: 7 MMOL/L
APTT BLD: 19.5 SEC (ref 22–30)
AST SERPL-CCNC: 37 U/L (ref 14–36)
BASOPHILS # BLD AUTO: 0 K/UL (ref 0–0.2)
BASOPHILS NFR BLD AUTO: 0 %
BUN SERPL-SCNC: 17 MG/DL (ref 7–17)
CALCIUM SPEC-MCNC: 9.5 MG/DL (ref 8.4–10.2)
CHLORIDE SERPL-SCNC: 106 MMOL/L (ref 98–107)
CO2 SERPL-SCNC: 28 MMOL/L (ref 22–30)
EOSINOPHIL # BLD AUTO: 0 K/UL (ref 0–0.7)
EOSINOPHIL NFR BLD AUTO: 1 %
ERYTHROCYTE [DISTWIDTH] IN BLOOD BY AUTOMATED COUNT: 4.82 M/UL (ref 3.8–5.4)
ERYTHROCYTE [DISTWIDTH] IN BLOOD: 13.7 % (ref 11.5–15.5)
GLUCOSE SERPL-MCNC: 99 MG/DL (ref 74–99)
HCT VFR BLD AUTO: 46.9 % (ref 34–46)
HGB BLD-MCNC: 14.6 GM/DL (ref 11.4–16)
INR PPP: 0.9 (ref ?–1.2)
LYMPHOCYTES # SPEC AUTO: 0.9 K/UL (ref 1–4.8)
LYMPHOCYTES NFR SPEC AUTO: 13 %
MCH RBC QN AUTO: 30.4 PG (ref 25–35)
MCHC RBC AUTO-ENTMCNC: 31.2 G/DL (ref 31–37)
MCV RBC AUTO: 97.3 FL (ref 80–100)
MONOCYTES # BLD AUTO: 0.4 K/UL (ref 0–1)
MONOCYTES NFR BLD AUTO: 6 %
NEUTROPHILS # BLD AUTO: 5.1 K/UL (ref 1.3–7.7)
NEUTROPHILS NFR BLD AUTO: 78 %
PH UR: 7 [PH] (ref 5–8)
PLATELET # BLD AUTO: 184 K/UL (ref 150–450)
POTASSIUM SERPL-SCNC: 4.1 MMOL/L (ref 3.5–5.1)
PROT SERPL-MCNC: 7.1 G/DL (ref 6.3–8.2)
PT BLD: 9.8 SEC (ref 9–12)
RBC UR QL: 2 /HPF (ref 0–5)
SODIUM SERPL-SCNC: 141 MMOL/L (ref 137–145)
SP GR UR: 1.01 (ref 1–1.03)
SQUAMOUS UR QL AUTO: <1 /HPF (ref 0–4)
UROBILINOGEN UR QL STRIP: <2 MG/DL (ref ?–2)
WBC # BLD AUTO: 6.6 K/UL (ref 3.8–10.6)
WBC # UR AUTO: 6 /HPF (ref 0–5)

## 2020-03-03 PROCEDURE — 81001 URINALYSIS AUTO W/SCOPE: CPT

## 2020-03-03 PROCEDURE — 84484 ASSAY OF TROPONIN QUANT: CPT

## 2020-03-03 PROCEDURE — 82550 ASSAY OF CK (CPK): CPT

## 2020-03-03 PROCEDURE — 96374 THER/PROPH/DIAG INJ IV PUSH: CPT

## 2020-03-03 PROCEDURE — 83690 ASSAY OF LIPASE: CPT

## 2020-03-03 PROCEDURE — 82271 OCCULT BLOOD OTHER SOURCES: CPT

## 2020-03-03 PROCEDURE — 87086 URINE CULTURE/COLONY COUNT: CPT

## 2020-03-03 PROCEDURE — 80053 COMPREHEN METABOLIC PANEL: CPT

## 2020-03-03 PROCEDURE — 88307 TISSUE EXAM BY PATHOLOGIST: CPT

## 2020-03-03 PROCEDURE — 85730 THROMBOPLASTIN TIME PARTIAL: CPT

## 2020-03-03 PROCEDURE — 88305 TISSUE EXAM BY PATHOLOGIST: CPT

## 2020-03-03 PROCEDURE — 96361 HYDRATE IV INFUSION ADD-ON: CPT

## 2020-03-03 PROCEDURE — 85027 COMPLETE CBC AUTOMATED: CPT

## 2020-03-03 PROCEDURE — 83605 ASSAY OF LACTIC ACID: CPT

## 2020-03-03 PROCEDURE — 85610 PROTHROMBIN TIME: CPT

## 2020-03-03 PROCEDURE — 99285 EMERGENCY DEPT VISIT HI MDM: CPT

## 2020-03-03 PROCEDURE — 82553 CREATINE MB FRACTION: CPT

## 2020-03-03 PROCEDURE — 74018 RADEX ABDOMEN 1 VIEW: CPT

## 2020-03-03 PROCEDURE — 36415 COLL VENOUS BLD VENIPUNCTURE: CPT

## 2020-03-03 PROCEDURE — 71045 X-RAY EXAM CHEST 1 VIEW: CPT

## 2020-03-03 PROCEDURE — 85025 COMPLETE CBC W/AUTO DIFF WBC: CPT

## 2020-03-03 PROCEDURE — 84443 ASSAY THYROID STIM HORMONE: CPT

## 2020-03-03 PROCEDURE — 96375 TX/PRO/DX INJ NEW DRUG ADDON: CPT

## 2020-03-03 PROCEDURE — 64488 TAP BLOCK BI INJECTION: CPT

## 2020-03-03 PROCEDURE — 74177 CT ABD & PELVIS W/CONTRAST: CPT

## 2020-03-03 PROCEDURE — 93005 ELECTROCARDIOGRAM TRACING: CPT

## 2020-03-03 RX ADMIN — CEFAZOLIN SCH MLS/HR: 330 INJECTION, POWDER, FOR SOLUTION INTRAMUSCULAR; INTRAVENOUS at 21:08

## 2020-03-03 NOTE — CT
EXAMINATION TYPE: CT abdomen pelvis w con

 

DATE OF EXAM: 3/3/2020

 

COMPARISON: 10/23/2019

 

HISTORY: Abdominal distention.

 

CT DLP: 627.8 mGycm

Automated exposure control for dose reduction was used.

 

CONTRAST: 

Performed with IV Contrast, patient injected with 100 mL of Isovue 300.

 

Multiple axial sections were obtained from the diaphragm to the floor the pelvis with oral and intrav
enous contrast.

 

Lung bases are clear of infiltrate. There is no pleural effusion. Heart size is normal.

 

Liver shows no focal defect. Spleen is intact. There is no pancreatic mass. Gallbladder appears darryl
l. Bile ducts are not dilated.

 

There is no adrenal mass. Kidneys show satisfactory contrast opacification. There is no hydronephrosi
s. There is 1 cm cortical cyst lower pole right kidney. There is probably 5 mm calculus lower pole ri
ght kidney. Ureters are not dilated. Bladder distends smoothly. There is no inguinal hernia.

 

There are multiple mildly dilated fluid-filled loops of small bowel in the abdomen. The terminal ileu
m is not dilated. Small bowel measures up to 3 cm. Transition point not identified. 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
evorotoscoliosis. There are spondylotic changes in the lumbar spine. Bony pelvis is intact. There is 
no mesenteric edema. There is no ascites or free air. There is no sign of thickened appendix. There i
s very small amount of free fluid in the pelvis.

 

IMPRESSION:

Dilated small bowel suggestive of partial mechanical small bowel obstruction that appears new compare
d to old exam. Transition point not seen.

 

Nonobstructing small right renal calculus unchanged.

## 2020-03-03 NOTE — ED
Abdominal Pain HPI





- General


Chief Complaint: Abdominal Pain


Stated Complaint: Abdominal pain


Time Seen by Provider: 20 15:47


Source: patient, RN notes reviewed


Mode of arrival: ambulatory


Limitations: no limitations





- History of Present Illness


Initial Comments: 





80-year-old female presents to the emergency Department with chief complaint of 

abdominal pain concern for bowel infection.  Patient states that she saw Dr. Batres today who sent over to rule out small bowel obstruction.  She states 

she had a obstruction approximately one year ago that resolved without 

treatment.  Patient states that she has had a history of endometrial cancer with

hysterectomy.  Patient denies any dysuria, hematuria she does admit to some 

nausea without evidence of vomiting.  Patient did have a bowel movement this 

morning but states that she has not had any after to 8 PM today.  Patient offers

no other complaints.





- Related Data


                                Home Medications











 Medication  Instructions  Recorded  Confirmed


 


Gabapentin [Neurontin] 300 mg PO HS 01/16/18 10/23/19


 


Brimonidine Tartrate [Alphagan P 1 drop BOTH EYES TID 04/13/18 10/23/19





0.2% Ophth Soln]   


 


Dorzolamide/Timolol/Pf [Cosopt Pf 1 drop BOTH EYES TID 04/13/18 10/23/19





2%/5% Ophth Droperette]   


 


Furosemide [Lasix] 20 mg PO DAILY PRN 04/13/18 10/23/19


 


Docusate Sodium [Dok] 100 mg PO HS 09/09/18 10/23/19


 


Famotidine [Pepcid] 20 mg PO DAILY PRN 03/26/19 10/23/19


 


Isosorbide Mononitrate ER [Imdur] 30 mg PO DAILY 03/26/19 10/23/19


 


Potassium Chloride [Klor-Con 20] 10 meq PO DAILY PRN 04/03/19 10/23/19


 


Calcium Carbonate/Vitamin D3 1 cap PO BID 04/28/19 10/23/19





[Calcium 600-Vit D3 500 Softgel]   


 


Aspirin EC [Ecotrin Low Dose] 81 mg PO HS 08/01/19 10/23/19


 


Metoprolol Tartrate [Lopressor] 25 mg PO BID@1200,2100 08/01/19 10/23/19








                                  Previous Rx's











 Medication  Instructions  Recorded


 


Atorvastatin [Lipitor] 40 mg PO DAILY #30 tab 18


 


Clopidogrel [Plavix] 75 mg PO DAILY #30 tab 18


 


Nitroglycerin Sl Tabs [Nitrostat] 0.4 mg SUBLINGUAL Q5M PRN #25 tab 18


 


Cephalexin [Keflex] 500 mg PO Q6HR 3 Days #12 cap 10/24/19











                                    Allergies











Allergy/AdvReac Type Severity Reaction Status Date / Time


 


ibuprofen [From Advil] AdvReac  Nausea & Verified 20 15:27





   Vomiting  














Review of Systems


ROS Statement: 


Those systems with pertinent positive or pertinent negative responses have been 

documented in the HPI.





ROS Other: All systems not noted in ROS Statement are negative.





Past Medical History


Past Medical History: Coronary Artery Disease (CAD), Cancer, Deep Vein 

Thrombosis (DVT), Eye Disorder, GERD/Reflux, Hyperlipidemia, Hypertension, 

Myocardial Infarction (MI), Mitral Valve Prolapse (MVP), Osteoarthritis (OA)


Additional Past Medical History / Comment(s): Hx of bowel obstruction 2018 

while in [place], hx of partial bowel obstruction 2019. Legal blindness due 

to glaucoma, neuropathy bilateral legs. Hx of uterine cancer , endometrial 

and cervical cancer treated with radiation Hx of DVT 2-3 yrs ago. Radiation 

cystitis of the bladder


Last Myocardial Infarction Date:: 18


History of Any Multi-Drug Resistant Organisms: None Reported


Past Surgical History: Breast Surgery, Heart Catheterization With Stent, 

Hysterectomy


Additional Past Surgical History / Comment(s): Eye surgery, breast biopsy, 1 

cardiac stent.


Past Anesthesia/Blood Transfusion Reactions: No Reported Reaction


Additional Past Anesthesia/Blood Transfusion Reaction / Comment(s): Slow to wake


Date of Last Stent Placement:: 


Past Psychological History: No Psychological Hx Reported


Smoking Status: Never smoker


Past Alcohol Use History: None Reported


Past Drug Use History: None Reported





- Past Family History


  ** Mother


Family Medical History: Cancer, Renal Disease


Additional Family Medical History / Comment(s): Uterine CA.





  ** Brother(s)


Family Medical History: Cancer


Additional Family Medical History / Comment(s): Colon CA.





  ** Sister(s)


Family Medical History: Cancer


Additional Family Medical History / Comment(s): Colon and breast CA.





  ** Father


Family Medical History: Myocardial Infarction (MI)


Additional Family Medical History / Comment(s):  at 54 from MI.





General Exam


Limitations: no limitations


General appearance: alert, in no apparent distress


Head exam: Present: atraumatic, normocephalic, normal inspection


Eye exam: Present: normal appearance, PERRL, EOMI.  Absent: scleral icterus, 

conjunctival injection, periorbital swelling


Respiratory exam: Present: normal lung sounds bilaterally.  Absent: respiratory 

distress, wheezes, rales, rhonchi, stridor


Cardiovascular Exam: Present: regular rate, normal rhythm, normal heart sounds. 

Absent: systolic murmur, diastolic murmur, rubs, gallop, clicks


GI/Abdominal exam: Present: soft, distended, tenderness, normal bowel sounds.  

Absent: guarding, rebound, rigid


Back exam: Absent: CVA tenderness (R), CVA tenderness (L)


Skin exam: Present: warm, dry, intact, normal color.  Absent: rash





Course


                                   Vital Signs











  20





  15:23 16:45 19:00


 


Temperature 98.0 F  


 


Pulse Rate 70 66 74


 


Respiratory 16 18 18





Rate   


 


Blood Pressure 188/95 195/82 192/83


 


O2 Sat by Pulse 100 100 100





Oximetry   














Medical Decision Making





- Medical Decision Making





80-year-old female presented for abdominal pain.  CT shows evidence of partial 

small bowel obstruction.  Patient will have NG tube placed, IV fluid hydration, 

pain control and antiemetics.





- Lab Data


Result diagrams: 


                                 20 16:40





                                 20 16:40


                                   Lab Results











  20 Range/Units





  16:40 16:40 16:40 


 


WBC  6.6    (3.8-10.6)  k/uL


 


RBC  4.82    (3.80-5.40)  m/uL


 


Hgb  14.6    (11.4-16.0)  gm/dL


 


Hct  46.9 H    (34.0-46.0)  %


 


MCV  97.3    (80.0-100.0)  fL


 


MCH  30.4    (25.0-35.0)  pg


 


MCHC  31.2    (31.0-37.0)  g/dL


 


RDW  13.7    (11.5-15.5)  %


 


Plt Count  184    (150-450)  k/uL


 


Neutrophils %  78    %


 


Lymphocytes %  13    %


 


Monocytes %  6    %


 


Eosinophils %  1    %


 


Basophils %  0    %


 


Neutrophils #  5.1    (1.3-7.7)  k/uL


 


Lymphocytes #  0.9 L    (1.0-4.8)  k/uL


 


Monocytes #  0.4    (0-1.0)  k/uL


 


Eosinophils #  0.0    (0-0.7)  k/uL


 


Basophils #  0.0    (0-0.2)  k/uL


 


PT   9.8   (9.0-12.0)  sec


 


INR   0.9   (<1.2)  


 


APTT   19.5 L   (22.0-30.0)  sec


 


Sodium     (137-145)  mmol/L


 


Potassium     (3.5-5.1)  mmol/L


 


Chloride     ()  mmol/L


 


Carbon Dioxide     (22-30)  mmol/L


 


Anion Gap     mmol/L


 


BUN     (7-17)  mg/dL


 


Creatinine     (0.52-1.04)  mg/dL


 


Est GFR (CKD-EPI)AfAm     (>60 ml/min/1.73 sqM)  


 


Est GFR (CKD-EPI)NonAf     (>60 ml/min/1.73 sqM)  


 


Glucose     (74-99)  mg/dL


 


Plasma Lactic Acid Zia     (0.7-2.0)  mmol/L


 


Calcium     (8.4-10.2)  mg/dL


 


Total Bilirubin     (0.2-1.3)  mg/dL


 


AST     (14-36)  U/L


 


ALT     (4-34)  U/L


 


Alkaline Phosphatase     ()  U/L


 


Total Protein     (6.3-8.2)  g/dL


 


Albumin     (3.5-5.0)  g/dL


 


Lipase     ()  U/L


 


Urine Color    Light Yellow  


 


Urine Appearance    Clear  (Clear)  


 


Urine pH    7.0  (5.0-8.0)  


 


Ur Specific Gravity    1.011  (1.001-1.035)  


 


Urine Protein    Negative  (Negative)  


 


Urine Glucose (UA)    Negative  (Negative)  


 


Urine Ketones    Negative  (Negative)  


 


Urine Blood    Negative  (Negative)  


 


Urine Nitrite    Negative  (Negative)  


 


Urine Bilirubin    Negative  (Negative)  


 


Urine Urobilinogen    <2.0  (<2.0)  mg/dL


 


Ur Leukocyte Esterase    Moderate H  (Negative)  


 


Urine RBC    2  (0-5)  /hpf


 


Urine WBC    6 H  (0-5)  /hpf


 


Ur Squamous Epith Cells    <1  (0-4)  /hpf














  20 Range/Units





  16:40 16:40 


 


WBC    (3.8-10.6)  k/uL


 


RBC    (3.80-5.40)  m/uL


 


Hgb    (11.4-16.0)  gm/dL


 


Hct    (34.0-46.0)  %


 


MCV    (80.0-100.0)  fL


 


MCH    (25.0-35.0)  pg


 


MCHC    (31.0-37.0)  g/dL


 


RDW    (11.5-15.5)  %


 


Plt Count    (150-450)  k/uL


 


Neutrophils %    %


 


Lymphocytes %    %


 


Monocytes %    %


 


Eosinophils %    %


 


Basophils %    %


 


Neutrophils #    (1.3-7.7)  k/uL


 


Lymphocytes #    (1.0-4.8)  k/uL


 


Monocytes #    (0-1.0)  k/uL


 


Eosinophils #    (0-0.7)  k/uL


 


Basophils #    (0-0.2)  k/uL


 


PT    (9.0-12.0)  sec


 


INR    (<1.2)  


 


APTT    (22.0-30.0)  sec


 


Sodium  141   (137-145)  mmol/L


 


Potassium  4.1   (3.5-5.1)  mmol/L


 


Chloride  106   ()  mmol/L


 


Carbon Dioxide  28   (22-30)  mmol/L


 


Anion Gap  7   mmol/L


 


BUN  17   (7-17)  mg/dL


 


Creatinine  0.71   (0.52-1.04)  mg/dL


 


Est GFR (CKD-EPI)AfAm  >90   (>60 ml/min/1.73 sqM)  


 


Est GFR (CKD-EPI)NonAf  82   (>60 ml/min/1.73 sqM)  


 


Glucose  99   (74-99)  mg/dL


 


Plasma Lactic Acid Zia   1.6  (0.7-2.0)  mmol/L


 


Calcium  9.5   (8.4-10.2)  mg/dL


 


Total Bilirubin  1.0   (0.2-1.3)  mg/dL


 


AST  37 H   (14-36)  U/L


 


ALT  25   (4-34)  U/L


 


Alkaline Phosphatase  88   ()  U/L


 


Total Protein  7.1   (6.3-8.2)  g/dL


 


Albumin  4.1   (3.5-5.0)  g/dL


 


Lipase  215   ()  U/L


 


Urine Color    


 


Urine Appearance    (Clear)  


 


Urine pH    (5.0-8.0)  


 


Ur Specific Gravity    (1.001-1.035)  


 


Urine Protein    (Negative)  


 


Urine Glucose (UA)    (Negative)  


 


Urine Ketones    (Negative)  


 


Urine Blood    (Negative)  


 


Urine Nitrite    (Negative)  


 


Urine Bilirubin    (Negative)  


 


Urine Urobilinogen    (<2.0)  mg/dL


 


Ur Leukocyte Esterase    (Negative)  


 


Urine RBC    (0-5)  /hpf


 


Urine WBC    (0-5)  /hpf


 


Ur Squamous Epith Cells    (0-4)  /hpf














Disposition


Clinical Impression: 


 Small bowel obstruction





Disposition: ADMITTED AS IP TO THIS HOSP


Condition: Fair


Referrals: 


Ashwin Reynoso DO [Primary Care Provider] - 1-2 days

## 2020-03-04 RX ADMIN — METOPROLOL TARTRATE SCH MG: 25 TABLET, FILM COATED ORAL at 22:01

## 2020-03-04 RX ADMIN — BENZOCAINE PRN SPRAY: 200 SPRAY DENTAL; ORAL; PERIODONTAL at 06:06

## 2020-03-04 RX ADMIN — DOCUSATE SODIUM SCH MG: 100 CAPSULE, LIQUID FILLED ORAL at 00:56

## 2020-03-04 RX ADMIN — BRIMONIDINE TARTRATE SCH DROPS: 2 SOLUTION/ DROPS OPHTHALMIC at 09:05

## 2020-03-04 RX ADMIN — BRIMONIDINE TARTRATE SCH DROPS: 2 SOLUTION/ DROPS OPHTHALMIC at 00:59

## 2020-03-04 RX ADMIN — DORZOLAMIDE HYDROCHLORIDE AND TIMOLOL MALEATE SCH DROPS: 20; 5 SOLUTION/ DROPS OPHTHALMIC at 00:59

## 2020-03-04 RX ADMIN — FAMOTIDINE SCH: 20 TABLET, FILM COATED ORAL at 22:07

## 2020-03-04 RX ADMIN — METOPROLOL TARTRATE SCH MG: 25 TABLET, FILM COATED ORAL at 09:05

## 2020-03-04 RX ADMIN — ASPIRIN 81 MG CHEWABLE TABLET SCH MG: 81 TABLET CHEWABLE at 09:05

## 2020-03-04 RX ADMIN — CEFAZOLIN SCH: 330 INJECTION, POWDER, FOR SOLUTION INTRAMUSCULAR; INTRAVENOUS at 16:30

## 2020-03-04 RX ADMIN — DORZOLAMIDE HYDROCHLORIDE AND TIMOLOL MALEATE SCH DROPS: 20; 5 SOLUTION/ DROPS OPHTHALMIC at 16:28

## 2020-03-04 RX ADMIN — BENZOCAINE PRN SPRAY: 200 SPRAY DENTAL; ORAL; PERIODONTAL at 22:01

## 2020-03-04 RX ADMIN — DORZOLAMIDE HYDROCHLORIDE AND TIMOLOL MALEATE SCH DROPS: 20; 5 SOLUTION/ DROPS OPHTHALMIC at 09:04

## 2020-03-04 RX ADMIN — DOCUSATE SODIUM SCH: 100 CAPSULE, LIQUID FILLED ORAL at 22:07

## 2020-03-04 RX ADMIN — CEFAZOLIN SCH MLS/HR: 330 INJECTION, POWDER, FOR SOLUTION INTRAMUSCULAR; INTRAVENOUS at 16:29

## 2020-03-04 RX ADMIN — ATORVASTATIN CALCIUM SCH MG: 40 TABLET, FILM COATED ORAL at 09:05

## 2020-03-04 RX ADMIN — BRIMONIDINE TARTRATE SCH DROPS: 2 SOLUTION/ DROPS OPHTHALMIC at 22:00

## 2020-03-04 RX ADMIN — DORZOLAMIDE HYDROCHLORIDE AND TIMOLOL MALEATE SCH DROPS: 20; 5 SOLUTION/ DROPS OPHTHALMIC at 22:00

## 2020-03-04 RX ADMIN — FAMOTIDINE SCH MG: 20 TABLET, FILM COATED ORAL at 00:56

## 2020-03-04 RX ADMIN — GABAPENTIN SCH: 300 CAPSULE ORAL at 22:07

## 2020-03-04 RX ADMIN — HEPARIN SODIUM SCH UNIT: 5000 INJECTION, SOLUTION INTRAVENOUS; SUBCUTANEOUS at 22:01

## 2020-03-04 RX ADMIN — ASPIRIN 81 MG CHEWABLE TABLET SCH MG: 81 TABLET CHEWABLE at 00:59

## 2020-03-04 RX ADMIN — ISOSORBIDE MONONITRATE SCH MG: 30 TABLET, EXTENDED RELEASE ORAL at 09:05

## 2020-03-04 RX ADMIN — METOPROLOL TARTRATE SCH MG: 25 TABLET, FILM COATED ORAL at 00:56

## 2020-03-04 RX ADMIN — GABAPENTIN SCH MG: 300 CAPSULE ORAL at 00:56

## 2020-03-04 RX ADMIN — CEFAZOLIN SCH MLS/HR: 330 INJECTION, POWDER, FOR SOLUTION INTRAMUSCULAR; INTRAVENOUS at 19:43

## 2020-03-04 RX ADMIN — BRIMONIDINE TARTRATE SCH DROPS: 2 SOLUTION/ DROPS OPHTHALMIC at 16:28

## 2020-03-04 NOTE — P.GSHP
History of Present Illness


H&P Date: 20


Chief Complaint: abdominal pain





CHIEF COMPLAINT: Abdominal pain





HISTORY OF PRESENT ILLNESS: 80-year-old female with a history of endometrial 

cancer and previous bowel obstructions who was seen in the outpatient setting by

Dr. Batres yesterday. Patient was referred to the ER for further evaluation. 

CT scan completed revealing small bowel obstruction. NG tube was placed in the 

ER. Bilious drainage noted. Patient reports she is feeling better today compared

to yesterday. 





PAST MEDICAL HISTORY: 


See list.





PAST SURGICAL HISTORY: 


See list.





SOCIAL HISTORY: No illicit drug use.  





REVIEW OF SYSTEMS: 


CONSTITUTIONAL: Denies fever or chills.


HEENT: Denies blurred vision, vision changes, or eye pain. Denies hemoptysis 


CARDIOVASCULAR: Denies chest pain or pressure.


RESPIRATORY: No shortness of breath. 


GASTROINTESTINAL: Refer to HPI for pertinent findings


HEMATOLOGIC: Denies bleeding disorders.


GENITOURINARY:  Denies any blood in urine.


SKIN: Denies pruitis. Denies rash.





PHYSICAL EXAM: 


VITAL SIGNS: Reviewed.


GENERAL: Well-developed in no acute distress. 


HEENT:  No sclera icterus. Extraocular movements grossly intact.  Moist buccal 

mucosa. Head is atraumatic, normocephalic. 


ABDOMEN:  Soft.  Minimal distention.  Mild tenderness with palpation.


NEUROLOGIC: Alert and oriented. Cranial nerves II through XII grossly intact.





LABORATORY DATA:


WBC 6.6.  Hemoglobin 14.6.  Platelet count 184.  Sodium 141.  Potassium 4.1.  

BUN 17.  Creatinine 0.71.  Lactic acid 1.6.





IMAGING:


CT abdomen and pelvis: Dilated small bowel suggestive of partial mechanical 

small bowel obstruction





ASSESSMENT: 


1.  Small bowel obstruction





PLAN: 


-Continue NPO. Continue IV fluids


-Continue NG to LIS


-Discontinue plavix. Patient received dose this AM


-Will attempt conservative measures first. Possible surgical intervention in a 

few days if bowel obstruction does not resolve.





Nurse practitioner note has been reviewed by physician. Signing provider agrees 

with the documented findings, assessment, and plan of care. 








Past Medical History


Past Medical History: Coronary Artery Disease (CAD), Cancer, Deep Vein 

Thrombosis (DVT), Eye Disorder, GERD/Reflux, Hyperlipidemia, Hypertension, 

Myocardial Infarction (MI), Mitral Valve Prolapse (MVP), Osteoarthritis (OA)


Additional Past Medical History / Comment(s): Hx of bowel obstruction 2018 

while in [place], hx of partial bowel obstruction 2019. Legal blindness due 

to glaucoma, neuropathy bilateral legs. Hx of uterine cancer , endometrial 

and cervical cancer treated with radiation Hx of DVT 2-3 yrs ago. Radiation 

cystitis of the bladder


Last Myocardial Infarction Date:: 18


History of Any Multi-Drug Resistant Organisms: None Reported


Past Surgical History: Breast Surgery, Heart Catheterization With Stent, 

Hysterectomy


Additional Past Surgical History / Comment(s): Eye surgery, breast biopsy, 1 

cardiac stent.


Past Anesthesia/Blood Transfusion Reactions: No Reported Reaction


Additional Past Anesthesia/Blood Transfusion Reaction / Comment(s): Slow to wake


Date of Last Stent Placement:: 


Past Psychological History: No Psychological Hx Reported


Additional Psychological History / Comment(s): Pt resides with her spouse.  She 

is legally blind.  She still manages her medications at this time.  She uses a 

walker or cane to ambulate. Her spouse drives.


Smoking Status: Never smoker


Past Alcohol Use History: None Reported


Additional Past Alcohol Use History / Comment(s): Patient is a lifelong 

nonsmoker, no illicit drug use or alcohol use.


Past Drug Use History: None Reported





- Past Family History


  ** Mother


Family Medical History: Cancer, Renal Disease


Additional Family Medical History / Comment(s): Uterine CA.





  ** Brother(s)


Family Medical History: Cancer


Additional Family Medical History / Comment(s): Colon CA.





  ** Sister(s)


Family Medical History: Cancer


Additional Family Medical History / Comment(s): Colon and breast CA.





  ** Father


Family Medical History: Myocardial Infarction (MI)


Additional Family Medical History / Comment(s):  at 54 from MI.





Medications and Allergies


                                Home Medications











 Medication  Instructions  Recorded  Confirmed  Type


 


Gabapentin [Neurontin] 300 mg PO HS 18 History


 


Brimonidine Tartrate [Alphagan P 1 drop BOTH EYES TID 18 History





0.2% Ophth Soln]    


 


Dorzolamide/Timolol/Pf [Cosopt Pf 1 drop BOTH EYES TID 18 History





2%/5% Ophth Droperette]    


 


Furosemide [Lasix] 20 mg PO DAILY PRN 18 History


 


Atorvastatin [Lipitor] 40 mg PO DAILY #30 tab 18 Rx


 


Clopidogrel [Plavix] 75 mg PO DAILY #30 tab 18 Rx


 


Nitroglycerin Sl Tabs [Nitrostat] 0.4 mg SUBLINGUAL Q5M PRN #25 tab 18 Rx


 


Docusate Sodium [Dok] 100 mg PO HS 18 History


 


Famotidine [Pepcid] 20 mg PO DAILY PRN 19 History


 


Isosorbide Mononitrate ER [Imdur] 30 mg PO DAILY 19 History


 


Aspirin EC [Ecotrin Low Dose] 81 mg PO HS 19 History


 


Metoprolol Tartrate [Lopressor] 25 mg PO BID 19 History


 


Calcium/Vitamin D3 1000iu 1 tab PO BID 20 History


 


Potassium Chloride ER [K-Dur 10] 10 meq PO DAILY PRN 20 History








                                    Allergies











Allergy/AdvReac Type Severity Reaction Status Date / Time


 


ibuprofen [From Advil] AdvReac  Nausea & Verified 20 15:27





   Vomiting  














Surgical - Exam


                                   Vital Signs











Temp Pulse Resp BP Pulse Ox


 


 98.0 F   70   16   188/95   100 


 


 20 15:23  20 15:23  20 15:23  20 15:23  20 15:23














Results





- Labs





                                 20 16:40





                                 20 16:40


                  Abnormal Lab Results - Last 24 Hours (Table)











  20 Range/Units





  16:40 16:40 16:40 


 


Hct  46.9 H    (34.0-46.0)  %


 


Lymphocytes #  0.9 L    (1.0-4.8)  k/uL


 


APTT   19.5 L   (22.0-30.0)  sec


 


AST     (14-36)  U/L


 


Ur Leukocyte Esterase    Moderate H  (Negative)  


 


Urine WBC    6 H  (0-5)  /hpf














  20 Range/Units





  16:40 


 


Hct   (34.0-46.0)  %


 


Lymphocytes #   (1.0-4.8)  k/uL


 


APTT   (22.0-30.0)  sec


 


AST  37 H  (14-36)  U/L


 


Ur Leukocyte Esterase   (Negative)  


 


Urine WBC   (0-5)  /hpf








                                 Diabetes panel











  20 Range/Units





  16:40 


 


Sodium  141  (137-145)  mmol/L


 


Potassium  4.1  (3.5-5.1)  mmol/L


 


Chloride  106  ()  mmol/L


 


Carbon Dioxide  28  (22-30)  mmol/L


 


BUN  17  (7-17)  mg/dL


 


Creatinine  0.71  (0.52-1.04)  mg/dL


 


Glucose  99  (74-99)  mg/dL


 


Calcium  9.5  (8.4-10.2)  mg/dL


 


AST  37 H  (14-36)  U/L


 


ALT  25  (4-34)  U/L


 


Alkaline Phosphatase  88  ()  U/L


 


Total Protein  7.1  (6.3-8.2)  g/dL


 


Albumin  4.1  (3.5-5.0)  g/dL








                                  Calcium panel











  20 Range/Units





  16:40 


 


Calcium  9.5  (8.4-10.2)  mg/dL


 


Albumin  4.1  (3.5-5.0)  g/dL








                                 Pituitary panel











  20 Range/Units





  16:40 


 


Sodium  141  (137-145)  mmol/L


 


Potassium  4.1  (3.5-5.1)  mmol/L


 


Chloride  106  ()  mmol/L


 


Carbon Dioxide  28  (22-30)  mmol/L


 


BUN  17  (7-17)  mg/dL


 


Creatinine  0.71  (0.52-1.04)  mg/dL


 


Glucose  99  (74-99)  mg/dL


 


Calcium  9.5  (8.4-10.2)  mg/dL








                                  Adrenal panel











  20 Range/Units





  16:40 


 


Sodium  141  (137-145)  mmol/L


 


Potassium  4.1  (3.5-5.1)  mmol/L


 


Chloride  106  ()  mmol/L


 


Carbon Dioxide  28  (22-30)  mmol/L


 


BUN  17  (7-17)  mg/dL


 


Creatinine  0.71  (0.52-1.04)  mg/dL


 


Glucose  99  (74-99)  mg/dL


 


Calcium  9.5  (8.4-10.2)  mg/dL


 


Total Bilirubin  1.0  (0.2-1.3)  mg/dL


 


AST  37 H  (14-36)  U/L


 


ALT  25  (4-34)  U/L


 


Alkaline Phosphatase  88  ()  U/L


 


Total Protein  7.1  (6.3-8.2)  g/dL


 


Albumin  4.1  (3.5-5.0)  g/dL

## 2020-03-04 NOTE — P.CONS
History of Present Illness





- Reason for Consult


Consult date: 20


Medical management





- History of Present Illness





This is a 80-year-old  female patient of Dr. Reynoso and Dr. Tobin 

with past medical history of coronary artery disease status post PCI of the LAD 

in 2019 with stenting to the LAD, hypertension, hyperlipidemia, DVT in 

2015, endometrial cancer and cervical cancer status post hysterectomy and 

radiation treatment, radiation cystitis, neuropathy bilateral lower extremities,

legally blind secondary to glaucoma, gastroesophageal reflux disease, mitral 

valve prolapse, generalized osteoarthritis, history of partial bowel obstruction

in 2018 and 2019.  Patient complains of abdominal pain which she 

states started yesterday.  Pain is in her abdomen and also in her back.  It is 

very similar to pain she had during her previous obstructions.  She denies 

having any chest pain, no lightheadedness or dizziness.  She states she did 

vomited last evening.  She was seen at Dr. Batres's office yesterday and was 

sent over to [facility] to rule out small bowel obstruction.  

At the time of this evaluation, patient is NG tube in place.  She denies having 

any nausea, she denies any flatus.  Patient is utilizing incentive spirometry 

reaching 1500 ML's.





Patient came into [facility] emergency center for evaluation.  

Patient was afebrile, heart rate 70, blood pressure initially 188/95, pulse ox 

100% on room air.  CBC unremarkable, electrolytes renal function within normal 

limits.  Urinalysis clear with moderate leukoesterase.  AST 37 and other liver 

function tests within normal limits, lipase 215.  CAT scan of the abdomen and 

pelvis with contrast revealed dilated small bowel suggestive partial mechanical 

small bowel obstruction appears new.  Transition point not seen.  Nonobstructing

small right renal calculus unchanged.  NG tube was placed, patient made nothing 

by mouth status and admitted to the [facility] floor under the care of Dr. Batres.





Review of Systems


Constitutional: Reports poor appetite, Denies chills, Denies fatigue, Denies 

fever, Denies lethargy, Denies malaise


Eyes: denies blurred vision (Patient is legally blind), denies pain


Ears, nose, mouth and throat: Denies dysphagia, Denies headache, Denies nasal 

congestion, Denies nasal discharge, Denies sore throat, Denies vertigo


Cardiovascular: Denies chest pain, Denies decreased exercise tolerance, Denies 

dyspnea on exertion, Denies edema, Denies leg edema, Denies lightheadedness, 

Denies palpitations, Denies shortness of breath, Denies syncope


Respiratory: Denies cough, Denies cough with sputum, Denies dyspnea, Denies 

excessive sputum, Denies hemoptysis, Denies home oxygen, Denies respiratory 

infections, Denies wheezing


Gastrointestinal: Reports abdominal pain, Reports bloating, Reports loss of 

appetite, Reports nausea, Reports vomiting, Denies diarrhea


Genitourinary: Denies dysuria, Denies hematuria, Denies urgency, Denies urinary 

frequency


Musculoskeletal: Denies frequent falls, Denies gait dysfunction, Denies muscle 

weakness, Denies myalgias


Integumentary: Denies pruritus, Denies rash, Denies wounds


Neurological: Denies change in mentation, Denies change in speech, Denies 

numbness, Denies weakness


Psychiatric: Denies anxiety, Denies depression


Endocrine: Denies fatigue, Denies weight change





Past Medical History


Past Medical History: Coronary Artery Disease (CAD), Cancer, Deep Vein 

Thrombosis (DVT), Eye Disorder, GERD/Reflux, Hyperlipidemia, Hypertension, Monty

cardial Infarction (MI), Mitral Valve Prolapse (MVP), Osteoarthritis (OA)


Additional Past Medical History / Comment(s): Hx of bowel obstruction 2018 

while in [place], hx of partial bowel obstruction 2019. Legal blindness due 

to glaucoma, neuropathy bilateral legs. Hx of uterine cancer , endometrial 

and cervical cancer treated with radiation Hx of DVT 2-3 yrs ago. Radiation 

cystitis of the bladder


Last Myocardial Infarction Date:: 18


History of Any Multi-Drug Resistant Organisms: None Reported


Past Surgical History: Breast Surgery, Heart Catheterization With Stent, 

Hysterectomy


Additional Past Surgical History / Comment(s): Eye surgery, breast biopsy, 1 

cardiac stent.


Past Anesthesia/Blood Transfusion Reactions: No Reported Reaction


Additional Past Anesthesia/Blood Transfusion Reaction / Comm: Slow to wake


Date of Last Stent Placement:: 


Past Psychological History: No Psychological Hx Reported


Additional Psychological History / Comment(s): Pt resides with her spouse.  She 

is legally blind.  She still manages her medications at this time.  She uses a 

walker or cane to ambulate. Her spouse drives.


Smoking Status: Never smoker


Past Alcohol Use History: None Reported


Additional Past Alcohol Use History / Comment(s): Patient is a lifelong no

nsmoker, no illicit drug use or alcohol use.  Patient lives at home with her 

.


Past Drug Use History: None Reported





- Past Family History


  ** Mother


Family Medical History: Cancer


Additional Family Medical History / Comment(s): Mother  at age 85 from old 

age with history of uterine cancer.





  ** Brother(s)


Family Medical History: Cancer


Additional Family Medical History / Comment(s): Patient is a total of 2 

brothers.  One has  from colon cancer.  One brother is alive with history of

coronary artery disease status post CABG.





  ** Sister(s)


Family Medical History: Cancer


Additional Family Medical History / Comment(s): Patient has total of 3 sisters 

and all are .  One from end-stage renal disease, one from a brain 

aneurysm and also had history of breast cancer, one from pneumonia 

complications.





  ** Father


Family Medical History: Myocardial Infarction (MI)


Additional Family Medical History / Comment(s):  at 54 from MI.





  ** Daughter(s)


Additional Family Medical History / Comment(s): Patient has one daughter with no

major medical problems.





Medications and Allergies


                                Home Medications











 Medication  Instructions  Recorded  Confirmed  Type


 


Gabapentin [Neurontin] 300 mg PO HS 18 History


 


Brimonidine Tartrate [Alphagan P 1 drop BOTH EYES TID 18 History





0.2% Ophth Soln]    


 


Dorzolamide/Timolol/Pf [Cosopt Pf 1 drop BOTH EYES TID 18 History





2%/5% Ophth Droperette]    


 


Furosemide [Lasix] 20 mg PO DAILY PRN 18 History


 


Atorvastatin [Lipitor] 40 mg PO DAILY #30 tab 18 Rx


 


Clopidogrel [Plavix] 75 mg PO DAILY #30 tab 18 Rx


 


Nitroglycerin Sl Tabs [Nitrostat] 0.4 mg SUBLINGUAL Q5M PRN #25 tab 18 Rx


 


Docusate Sodium [Dok] 100 mg PO HS 18 History


 


Famotidine [Pepcid] 20 mg PO DAILY PRN 19 History


 


Isosorbide Mononitrate ER [Imdur] 30 mg PO DAILY 19 History


 


Aspirin EC [Ecotrin Low Dose] 81 mg PO HS 19 History


 


Metoprolol Tartrate [Lopressor] 25 mg PO BID 19 History


 


Calcium/Vitamin D3 1000iu 1 tab PO BID 20 History


 


Potassium Chloride ER [K-Dur 10] 10 meq PO DAILY PRN 20 History








                                    Allergies











Allergy/AdvReac Type Severity Reaction Status Date / Time


 


ibuprofen [From Advil] AdvReac  Nausea & Verified 20 15:27





   Vomiting  














Physical Exam


Vitals: 


                                   Vital Signs











  Temp Pulse Pulse Resp BP BP Pulse Ox


 


 20 07:50    77  16   


 


 20 07:00  98.1 F   77  16   148/79  99


 


 20 02:57  98.0 F   76  18   161/81  95


 


 20 20:16  98.6 F   80  18   179/86  95


 


 20 19:52  98.4 F  65   20  184/85   99


 


 20 19:00   74   18  192/83   100


 


 20 16:45   66   18  195/82   100


 


 20 15:23  98.0 F  70   16  188/95   100








                                Intake and Output











 20





 22:59 06:59 14:59


 


Output Total  775 


 


Balance  -775 


 


Output:   


 


  Gastric Drainage  700 


 


  Urine  75 


 


Other:   


 


  Voiding Method  Bedpan Bedpan


 


  Weight 53.524 kg  

















Gen: This is a 80-year-old  female.  Patient is resting in bed.  She 

appears to be in no acute distress.


HEENT: Head is atraumatic, normocephalic. Pupils equal, round. Sclerae is 

anicteric.  NG tube in place draining dark brown fluid.


NECK: Supple. No JVD. No lymphadenopathy. No thyromegaly. 


LUNGS: Clear to auscultation. No wheezes or rhonchi.  No intercostal 

retractions.


HEART: Regular rate and rhythm. systolic murmur. 


ABDOMEN: Soft. Bowel sounds hypoactive. No masses.  No tenderness.


EXTREMITIES: No pedal edema.  No calf tenderness.  Dorsalis pedis +2 

bilaterally.


NEUROLOGICAL: Patient is awake, alert and oriented x3. Cranial nerves 2 through 

12 are grossly intact. 








Results


CBC & Chem 7: 


                                 20 16:40





                                 20 16:40


Labs: 


                  Abnormal Lab Results - Last 24 Hours (Table)











  20 Range/Units





  16:40 16:40 16:40 


 


Hct  46.9 H    (34.0-46.0)  %


 


Lymphocytes #  0.9 L    (1.0-4.8)  k/uL


 


APTT   19.5 L   (22.0-30.0)  sec


 


AST     (14-36)  U/L


 


Ur Leukocyte Esterase    Moderate H  (Negative)  


 


Urine WBC    6 H  (0-5)  /hpf














  20 Range/Units





  16:40 


 


Hct   (34.0-46.0)  %


 


Lymphocytes #   (1.0-4.8)  k/uL


 


APTT   (22.0-30.0)  sec


 


AST  37 H  (14-36)  U/L


 


Ur Leukocyte Esterase   (Negative)  


 


Urine WBC   (0-5)  /hpf














Assessment and Plan


Plan: 





1.  Partial small bowel obstruction.  Continue NG tube management, diet per 

general surgery.  Patient is currently nothing by mouth.  Continue incentive 

spirometry to reduce incidence of atelectasis and hospital-acquired pneumonia.  

Increase IV fluids 200 mL per hour.





2.  History of coronary artery disease status post PCI in 2019, stable.  

Continue aspirin 81 mg daily, Lipitor, Imdur 30 mg daily, Lopressor.  Plavix is 

on hold. Plavix is to be continued until 2020





3.  Hypertension.  Continue Lopressor 25 mg twice daily.  Vasotec IV added for 

systolic blood pressure greater than 160.





4.  Hyperlipidemia.  Continue Lipitor 40 mg daily.  





5.  History of endometrial cancer and cervical cancer status post hysterectomy 

and radiation treatment with radiation cystitis, stable.





6.  Peripheral neuropathy bilateral lower extremities.  Continue gabapentin 300 

mg at bedtime.





7.  Legally blind secondary to glaucoma.  Continue eyedrops.





8.  Gastroesophageal reflux disease.  Continue Pepcid.





9.  Mitral valve prolapse, stable.





10.  Generalized osteoarthritis.











Discharge plan: To be determined





Impression and plan of care have been directed as dictated by the signing 

physician.  Yazmin Boogie nurse practitioner acting as scribe for signing 

physician.

## 2020-03-05 LAB
ALBUMIN SERPL-MCNC: 3.1 G/DL (ref 3.5–5)
ALP SERPL-CCNC: 75 U/L (ref 38–126)
ALT SERPL-CCNC: 16 U/L (ref 4–34)
ANION GAP SERPL CALC-SCNC: 10 MMOL/L
AST SERPL-CCNC: 26 U/L (ref 14–36)
BASOPHILS # BLD AUTO: 0 K/UL (ref 0–0.2)
BASOPHILS NFR BLD AUTO: 0 %
BUN SERPL-SCNC: 16 MG/DL (ref 7–17)
CALCIUM SPEC-MCNC: 8.2 MG/DL (ref 8.4–10.2)
CHLORIDE SERPL-SCNC: 109 MMOL/L (ref 98–107)
CO2 SERPL-SCNC: 25 MMOL/L (ref 22–30)
EOSINOPHIL # BLD AUTO: 0 K/UL (ref 0–0.7)
EOSINOPHIL NFR BLD AUTO: 0 %
ERYTHROCYTE [DISTWIDTH] IN BLOOD BY AUTOMATED COUNT: 4.18 M/UL (ref 3.8–5.4)
ERYTHROCYTE [DISTWIDTH] IN BLOOD: 13.6 % (ref 11.5–15.5)
GLUCOSE SERPL-MCNC: 67 MG/DL (ref 74–99)
HCT VFR BLD AUTO: 41.4 % (ref 34–46)
HGB BLD-MCNC: 13 GM/DL (ref 11.4–16)
LYMPHOCYTES # SPEC AUTO: 0.9 K/UL (ref 1–4.8)
LYMPHOCYTES NFR SPEC AUTO: 15 %
MCH RBC QN AUTO: 31.2 PG (ref 25–35)
MCHC RBC AUTO-ENTMCNC: 31.4 G/DL (ref 31–37)
MCV RBC AUTO: 99.3 FL (ref 80–100)
MONOCYTES # BLD AUTO: 0.4 K/UL (ref 0–1)
MONOCYTES NFR BLD AUTO: 7 %
NEUTROPHILS # BLD AUTO: 4.5 K/UL (ref 1.3–7.7)
NEUTROPHILS NFR BLD AUTO: 75 %
PLATELET # BLD AUTO: 149 K/UL (ref 150–450)
POTASSIUM SERPL-SCNC: 4 MMOL/L (ref 3.5–5.1)
PROT SERPL-MCNC: 5.8 G/DL (ref 6.3–8.2)
SODIUM SERPL-SCNC: 144 MMOL/L (ref 137–145)
WBC # BLD AUTO: 5.9 K/UL (ref 3.8–10.6)

## 2020-03-05 RX ADMIN — CEFAZOLIN SCH MLS/HR: 330 INJECTION, POWDER, FOR SOLUTION INTRAMUSCULAR; INTRAVENOUS at 05:34

## 2020-03-05 RX ADMIN — HEPARIN SODIUM SCH UNIT: 5000 INJECTION, SOLUTION INTRAVENOUS; SUBCUTANEOUS at 20:06

## 2020-03-05 RX ADMIN — CEFAZOLIN SCH MLS/HR: 330 INJECTION, POWDER, FOR SOLUTION INTRAMUSCULAR; INTRAVENOUS at 16:30

## 2020-03-05 RX ADMIN — ISOSORBIDE MONONITRATE SCH MG: 30 TABLET, EXTENDED RELEASE ORAL at 08:15

## 2020-03-05 RX ADMIN — DOCUSATE SODIUM SCH: 100 CAPSULE, LIQUID FILLED ORAL at 20:14

## 2020-03-05 RX ADMIN — ASPIRIN 81 MG CHEWABLE TABLET SCH: 81 TABLET CHEWABLE at 08:16

## 2020-03-05 RX ADMIN — PANTOPRAZOLE SODIUM SCH MG: 40 INJECTION, POWDER, FOR SOLUTION INTRAVENOUS at 09:47

## 2020-03-05 RX ADMIN — BRIMONIDINE TARTRATE SCH DROPS: 2 SOLUTION/ DROPS OPHTHALMIC at 08:16

## 2020-03-05 RX ADMIN — METOPROLOL TARTRATE SCH MG: 25 TABLET, FILM COATED ORAL at 08:15

## 2020-03-05 RX ADMIN — ATORVASTATIN CALCIUM SCH: 40 TABLET, FILM COATED ORAL at 08:16

## 2020-03-05 RX ADMIN — GABAPENTIN SCH: 300 CAPSULE ORAL at 20:14

## 2020-03-05 RX ADMIN — METOPROLOL TARTRATE SCH: 25 TABLET, FILM COATED ORAL at 20:15

## 2020-03-05 RX ADMIN — DORZOLAMIDE HYDROCHLORIDE AND TIMOLOL MALEATE SCH DROPS: 20; 5 SOLUTION/ DROPS OPHTHALMIC at 20:07

## 2020-03-05 RX ADMIN — HEPARIN SODIUM SCH UNIT: 5000 INJECTION, SOLUTION INTRAVENOUS; SUBCUTANEOUS at 08:15

## 2020-03-05 RX ADMIN — BRIMONIDINE TARTRATE SCH DROPS: 2 SOLUTION/ DROPS OPHTHALMIC at 20:07

## 2020-03-05 RX ADMIN — DORZOLAMIDE HYDROCHLORIDE AND TIMOLOL MALEATE SCH DROPS: 20; 5 SOLUTION/ DROPS OPHTHALMIC at 08:15

## 2020-03-05 RX ADMIN — DORZOLAMIDE HYDROCHLORIDE AND TIMOLOL MALEATE SCH DROPS: 20; 5 SOLUTION/ DROPS OPHTHALMIC at 16:29

## 2020-03-05 RX ADMIN — PANTOPRAZOLE SODIUM SCH MG: 40 INJECTION, POWDER, FOR SOLUTION INTRAVENOUS at 20:06

## 2020-03-05 RX ADMIN — BRIMONIDINE TARTRATE SCH DROPS: 2 SOLUTION/ DROPS OPHTHALMIC at 16:29

## 2020-03-05 NOTE — P.PN
Subjective


Progress Note Date: 03/05/20





CHIEF COMPLAINT: Abdominal pain





HISTORY OF PRESENT ILLNESS: Patient examined this morning. She is sitting up in 

the chair. She denies abdominal pain. Denies nausea. Denies passing flatus or 

having a BM. NG to LIS with dark maroon coffee ground output. 





PHYSICAL EXAM: 


VITAL SIGNS: Reviewed.


GENERAL: Well-developed in no acute distress. 


HEENT:  No sclera icterus. Extraocular movements grossly intact.  Moist buccal 

mucosa. Head is atraumatic, normocephalic. 


ABDOMEN:  Soft.  Nondistended.  Mild tenderness with palpation.


NEUROLOGIC: Alert and oriented. Cranial nerves II through XII grossly intact.





ASSESSMENT: 


1.  Small bowel obstruction





PLAN: 


-Continue NPO. Continue IV fluids


-Continue NG to LIS


-Continue to hold Plavix


-Begin Protonix 40mg IV BID


-Obtain gastric occult blood


-Obtain CBC


-Possible surgical intervention tomorrow or Monday with Dr. Batres





Nurse practitioner note has been reviewed by physician. Signing provider agrees 

with the documented findings, assessment, and plan of care. 








Objective





- Vital Signs


Vital signs: 


                                   Vital Signs











Temp  98.7 F   03/05/20 07:00


 


Pulse  69   03/05/20 07:22


 


Resp  18   03/05/20 07:22


 


BP  198/63   03/05/20 07:00


 


Pulse Ox  97   03/05/20 07:00








                                 Intake & Output











 03/04/20 03/05/20 03/05/20





 18:59 06:59 18:59


 


Intake Total 950  


 


Output Total 75 700 75


 


Balance 875 -700 -75


 


Intake:   


 


  Intake, IV Titration 600  





  Amount   


 


    Sodium Chloride 0.9% 1, 600  





    000 ml @ 75 mls/hr IV .   





    [id number] [facility] Rx#:[id number]   


 


  Oral 350  


 


Output:   


 


  Gastric Drainage  700 


 


  Urine 75  75


 


Other:   


 


  Voiding Method Bedpan  Bedpan


 


  # Voids  1 1














- Labs


CBC & Chem 7: 


                                 03/03/20 16:40





                                 03/03/20 16:40

## 2020-03-06 VITALS — TEMPERATURE: 97.1 F

## 2020-03-06 LAB
ALBUMIN SERPL-MCNC: 1.8 G/DL (ref 3.5–5)
ALBUMIN SERPL-MCNC: 3 G/DL (ref 3.5–5)
ALP SERPL-CCNC: 36 U/L (ref 38–126)
ALP SERPL-CCNC: 69 U/L (ref 38–126)
ALT SERPL-CCNC: 14 U/L (ref 4–34)
ALT SERPL-CCNC: 9 U/L (ref 4–34)
ANION GAP SERPL CALC-SCNC: 13 MMOL/L
ANION GAP SERPL CALC-SCNC: 8 MMOL/L
AST SERPL-CCNC: 20 U/L (ref 14–36)
AST SERPL-CCNC: 24 U/L (ref 14–36)
BASOPHILS # BLD AUTO: 0 K/UL (ref 0–0.2)
BASOPHILS NFR BLD AUTO: 0 %
BUN SERPL-SCNC: 12 MG/DL (ref 7–17)
BUN SERPL-SCNC: 12 MG/DL (ref 7–17)
CALCIUM SPEC-MCNC: 7.1 MG/DL (ref 8.4–10.2)
CALCIUM SPEC-MCNC: 7.8 MG/DL (ref 8.4–10.2)
CHLORIDE SERPL-SCNC: 107 MMOL/L (ref 98–107)
CHLORIDE SERPL-SCNC: 110 MMOL/L (ref 98–107)
CK SERPL-CCNC: 41 U/L (ref 30–135)
CO2 SERPL-SCNC: 19 MMOL/L (ref 22–30)
CO2 SERPL-SCNC: 20 MMOL/L (ref 22–30)
EOSINOPHIL # BLD AUTO: 0 K/UL (ref 0–0.7)
EOSINOPHIL NFR BLD AUTO: 0 %
ERYTHROCYTE [DISTWIDTH] IN BLOOD BY AUTOMATED COUNT: 2.98 M/UL (ref 3.8–5.4)
ERYTHROCYTE [DISTWIDTH] IN BLOOD BY AUTOMATED COUNT: 3.95 M/UL (ref 3.8–5.4)
ERYTHROCYTE [DISTWIDTH] IN BLOOD: 13.5 % (ref 11.5–15.5)
ERYTHROCYTE [DISTWIDTH] IN BLOOD: 13.5 % (ref 11.5–15.5)
GLUCOSE BLD-MCNC: 23 MG/DL (ref 75–99)
GLUCOSE BLD-MCNC: 439 MG/DL (ref 75–99)
GLUCOSE BLD-MCNC: 75 MG/DL (ref 75–99)
GLUCOSE SERPL-MCNC: 74 MG/DL (ref 74–99)
GLUCOSE SERPL-MCNC: 81 MG/DL (ref 74–99)
HCT VFR BLD AUTO: 30.6 % (ref 34–46)
HCT VFR BLD AUTO: 38.9 % (ref 34–46)
HGB BLD-MCNC: 12.2 GM/DL (ref 11.4–16)
HGB BLD-MCNC: 9.2 GM/DL (ref 11.4–16)
KETONES UR QL STRIP.AUTO: (no result)
LYMPHOCYTES # SPEC AUTO: 1 K/UL (ref 1–4.8)
LYMPHOCYTES NFR SPEC AUTO: 10 %
MCH RBC QN AUTO: 30.8 PG (ref 25–35)
MCH RBC QN AUTO: 30.8 PG (ref 25–35)
MCHC RBC AUTO-ENTMCNC: 30 G/DL (ref 31–37)
MCHC RBC AUTO-ENTMCNC: 31.3 G/DL (ref 31–37)
MCV RBC AUTO: 102.9 FL (ref 80–100)
MCV RBC AUTO: 98.6 FL (ref 80–100)
MONOCYTES # BLD AUTO: 0.5 K/UL (ref 0–1)
MONOCYTES NFR BLD AUTO: 5 %
NEUTROPHILS # BLD AUTO: 8.6 K/UL (ref 1.3–7.7)
NEUTROPHILS NFR BLD AUTO: 85 %
PH UR: 5.5 [PH] (ref 5–8)
PLATELET # BLD AUTO: 169 K/UL (ref 150–450)
PLATELET # BLD AUTO: 170 K/UL (ref 150–450)
POTASSIUM SERPL-SCNC: 3.2 MMOL/L (ref 3.5–5.1)
POTASSIUM SERPL-SCNC: 4 MMOL/L (ref 3.5–5.1)
PROT SERPL-MCNC: 3.8 G/DL (ref 6.3–8.2)
PROT SERPL-MCNC: 5.7 G/DL (ref 6.3–8.2)
PROT UR QL: (no result)
RBC UR QL: >182 /HPF (ref 0–5)
SODIUM SERPL-SCNC: 137 MMOL/L (ref 137–145)
SODIUM SERPL-SCNC: 140 MMOL/L (ref 137–145)
SP GR UR: 1.02 (ref 1–1.03)
TROPONIN I SERPL-MCNC: <0.012 NG/ML (ref 0–0.03)
UROBILINOGEN UR QL STRIP: <2 MG/DL (ref ?–2)
WBC # BLD AUTO: 10.1 K/UL (ref 3.8–10.6)
WBC # BLD AUTO: 9.3 K/UL (ref 3.8–10.6)
WBC # UR AUTO: 40 /HPF (ref 0–5)

## 2020-03-06 RX ADMIN — BRIMONIDINE TARTRATE SCH DROPS: 2 SOLUTION/ DROPS OPHTHALMIC at 07:52

## 2020-03-06 RX ADMIN — DORZOLAMIDE HYDROCHLORIDE AND TIMOLOL MALEATE SCH: 20; 5 SOLUTION/ DROPS OPHTHALMIC at 18:57

## 2020-03-06 RX ADMIN — POTASSIUM CHLORIDE SCH MLS: 14.9 INJECTION, SOLUTION INTRAVENOUS at 12:37

## 2020-03-06 RX ADMIN — ATORVASTATIN CALCIUM SCH: 40 TABLET, FILM COATED ORAL at 09:41

## 2020-03-06 RX ADMIN — ISOSORBIDE MONONITRATE SCH MG: 30 TABLET, EXTENDED RELEASE ORAL at 07:45

## 2020-03-06 RX ADMIN — CEFAZOLIN SCH: 330 INJECTION, POWDER, FOR SOLUTION INTRAMUSCULAR; INTRAVENOUS at 03:00

## 2020-03-06 RX ADMIN — DORZOLAMIDE HYDROCHLORIDE AND TIMOLOL MALEATE SCH DROPS: 20; 5 SOLUTION/ DROPS OPHTHALMIC at 07:52

## 2020-03-06 RX ADMIN — POTASSIUM CHLORIDE SCH MLS/HR: 14.9 INJECTION, SOLUTION INTRAVENOUS at 19:35

## 2020-03-06 RX ADMIN — METOPROLOL TARTRATE SCH MG: 25 TABLET, FILM COATED ORAL at 07:45

## 2020-03-06 RX ADMIN — PANTOPRAZOLE SODIUM SCH MG: 40 INJECTION, POWDER, FOR SOLUTION INTRAVENOUS at 09:39

## 2020-03-06 RX ADMIN — BRIMONIDINE TARTRATE SCH: 2 SOLUTION/ DROPS OPHTHALMIC at 18:57

## 2020-03-06 NOTE — P.PN
Subjective


Progress Note Date: 03/06/20





This is a 80-year-old  female patient of Dr. Reynoso and Dr. Tobin 

with past medical history of coronary artery disease status post PCI of the LAD 

in June 2019 with stenting to the LAD, hypertension, hyperlipidemia, DVT in 

2015, endometrial cancer and cervical cancer status post hysterectomy and 

radiation treatment, radiation cystitis, neuropathy bilateral lower extremities,

legally blind secondary to glaucoma, gastroesophageal reflux disease, mitral 

valve prolapse, generalized osteoarthritis, history of partial bowel obstruction

in February 2018 and April 2019.  Patient complains of abdominal pain which she 

states started yesterday.  Pain is in her abdomen and also in her back.  It is 

very similar to pain she had during her previous obstructions.  She denies 

having any chest pain, no lightheadedness or dizziness.  She states she did 

vomited last evening.  She was seen at Dr. Batres's office yesterday and was 

sent over to [facility] to rule out small bowel obstruction.  

At the time of this evaluation, patient is NG tube in place.  She denies having 

any nausea, she denies any flatus.  Patient is utilizing incentive spirometry 

reaching 1500 ML's.





Patient came into [facility] for evaluation.  

Patient was afebrile, heart rate 70, blood pressure initially 188/95, pulse ox 

100% on room air.  CBC unremarkable, electrolytes renal function within normal 

limits.  Urinalysis clear with moderate leukoesterase.  AST 37 and other liver 

function tests within normal limits, lipase 215.  CAT scan of the abdomen and 

pelvis with contrast revealed dilated small bowel suggestive partial mechanical 

small bowel obstruction appears new.  Transition point not seen.  Nonobstructing

small right renal calculus unchanged.  NG tube was placed, patient made nothing 

by mouth status and admitted to the [facility] floor under the care of Dr. Emmanuel payan.





3/5: Patient has been afebrile, heart rate 69, blood pressure 198/63.  Patient 

does have Vasotec available for hypertension.  Patient states that she has not 

had any bowel movement, gas, she denies any nausea or vomiting.  She does 

complain of her back feeling sore.  Repeat blood work reveals platelet count 

149, chloride 109, creatinine 0.71, blood sugar 67.  Stool for occult blood was 

positive.  General surgery is evaluation for possible surgery tomorrow or 

Monday.





3/6: The patient seen today and now having bright red blood from NG tube.  

Patient also had bright red bleeding in her urine.  She states that she has had 

problems with urinary bleeding due to radiation cystitis and has had problems 

with blood clots causing retention during previous bowel obstructions.  Trotter 

catheter has been ordered, urine to be sent.  Heparin subcu and aspirin 

discontinued.  Patient states that she had a little bowel movement last evening 

and is passing small amount of gas.  Abdomen is soft.  KUB reveals nonspecific 

abdomen with retained contrast and fecal debris.  Dr. Gisel Hwang disc

ussed case, patient is scheduled for surgical intervention this afternoon.














Objective





- Vital Signs


Vital signs: 


                                   Vital Signs











Temp  98.1 F   03/06/20 07:40


 


Pulse  78   03/06/20 07:40


 


Resp  16   03/06/20 07:40


 


BP  153/67   03/06/20 09:43


 


Pulse Ox  99   03/06/20 07:40








                                 Intake & Output











 03/05/20 03/06/20 03/06/20





 18:59 06:59 18:59


 


Intake Total  0 


 


Output Total 75 150 


 


Balance -75 -150 


 


Intake:   


 


  Oral  0 


 


Output:   


 


  Gastric Drainage  100 


 


  Urine 75 50 


 


Other:   


 


  Voiding Method Bedpan  


 


  # Voids 3 2 














- Exam





Review of Systems


Constitutional: Reports poor appetite, Denies chills, Denies fatigue, Denies 

fever, Denies lethargy, Denies malaise


Eyes: denies blurred vision (Patient is legally blind), denies pain


Ears, nose, mouth and throat: Denies dysphagia, Denies headache, Denies nasal 

congestion, Denies nasal discharge, Denies sore throat, Denies vertigo


Cardiovascular: Denies chest pain, Denies decreased exercise tolerance, Denies 

dyspnea on exertion, Denies edema, Denies leg edema, Denies lightheadedness, 

Denies palpitations, Denies shortness of breath, Denies syncope


Respiratory: Denies cough, Denies cough with sputum, Denies dyspnea, Denies 

excessive sputum, Denies hemoptysis, Denies home oxygen, Denies respiratory 

infections, Denies wheezing


Gastrointestinal: Reports abdominal pain controlled, Reports bloating, Reports 

loss of appetite, denies nausea, denies vomiting, Denies diarrhea


Genitourinary: Denies dysuria, reports hematuria, Denies urgency, Denies urinary

frequency


Musculoskeletal: Denies frequent falls, Denies gait dysfunction, Denies muscle 

weakness, Denies myalgias


Integumentary: Denies pruritus, Denies rash, Denies wounds


Neurological: Denies change in mentation, Denies change in speech, Denies 

numbness, Denies weakness


Psychiatric: Denies anxiety, Denies depression


Endocrine: Denies fatigue, Denies weight change








Physical examination


Gen: This is a 80-year-old  female.  Patient is resting in chair.  She 

appears to be in no acute distress.   at bedside.  


HEENT: Head is atraumatic, normocephalic. Pupils equal, round. Sclerae is 

anicteric.  NG tube in place draining small amount of bright red return.


NECK: Supple. No JVD. No lymphadenopathy. No thyromegaly. 


LUNGS: Clear to auscultation. No wheezes or rhonchi.  No intercostal 

retractions.


HEART: Regular rate and rhythm. systolic murmur. 


ABDOMEN: Soft. Bowel sounds hypoactive. No masses.  no tenderness.


EXTREMITIES: No pedal edema.  No calf tenderness.  Dorsalis pedis +2 

bilaterally.


NEUROLOGICAL: Patient is awake, alert and oriented x3. Cranial nerves 2 through 

12 are grossly intact. 








- Labs


CBC & Chem 7: 


                                 03/06/20 08:49





                                 03/06/20 08:49


Labs: 


                  Abnormal Lab Results - Last 24 Hours (Table)











  03/05/20 03/05/20 03/06/20 Range/Units





  07:46 07:46 08:49 


 


Plt Count  149 L    (150-450)  k/uL


 


Lymphocytes #  0.9 L    (1.0-4.8)  k/uL


 


Potassium    3.2 L  (3.5-5.1)  mmol/L


 


Chloride   109 H   ()  mmol/L


 


Carbon Dioxide    20 L  (22-30)  mmol/L


 


Glucose   67 L   (74-99)  mg/dL


 


Calcium   8.2 L  7.8 L  (8.4-10.2)  mg/dL


 


Total Protein   5.8 L  5.7 L  (6.3-8.2)  g/dL


 


Albumin   3.1 L  3.0 L  (3.5-5.0)  g/dL














Assessment and Plan


Plan: 





1.  Partial small bowel obstruction.  Continue NG tube management, patient is 

nothing by mouth.  Continue incentive spirometry to reduce incidence of 

atelectasis and hospital-acquired pneumonia.  Increase IV fluids 100 mL per h

our.  Surgical intervention this afternoon.





2.  History of coronary artery disease status post PCI in June 2019, stable.  

Continue Lipitor, Imdur 30 mg daily, Lopressor.  Plavix and aspirin on hold. 

Plavix is to be continued until June 2020





3.  Hypertension.  Continue Lopressor 25 mg twice daily.  Vasotec IV added for 

systolic blood pressure greater than 160.





4.  Hyperlipidemia.  Continue Lipitor 40 mg daily.  





5.  History of endometrial cancer and cervical cancer status post hysterectomy 

and radiation treatment with radiation cystitis, stable.





6.  Peripheral neuropathy bilateral lower extremities.  Continue gabapentin 300 

mg at bedtime.





7.  Legally blind secondary to glaucoma.  Continue eyedrops.





8.  Gastroesophageal reflux disease.  Continue Pepcid.





9.  Mitral valve prolapse, stable.





10.  Generalized osteoarthritis.





11.  DVT prophylaxis.  NAYANA hose and SCDs.  Hold heparin due to hematuria.





12.  Hematuria secondary to radiation cystitis.  Heparin and aspirin 

discontinued.  Trotter catheter placed, urinalysis obtained.





Discharge plan: most likely subacute rehab





Impression and plan of care have been directed as dictated by the signing 

physician.  Yazmin Boogie nurse practitioner acting as scribe for signing 

physician.

## 2020-03-06 NOTE — XR
EXAMINATION TYPE: XR chest 1V portable

 

DATE OF EXAM: 3/6/2020

 

COMPARISON: 8/1/2019

 

HISTORY: Chest pain

 

TECHNIQUE: Single view

 

FINDINGS: There is some blunting of the costophrenic angles. There is nasogastric tube. There is left
-sided central venous catheter with tip in the superior vena cava. No heart failure.

 

IMPRESSION: Small pleural effusions are new compared to old exam. No heart failure seen.

## 2020-03-06 NOTE — P.OP
Date of Procedure: 03/06/20


Preoperative Diagnosis: 


Small bowel obstruction


Postoperative Diagnosis: 


Small bowel obstruction secondary to terminal ileum obstruction


Procedure(s) Performed: 


Ileocolectomy


Anesthesia: YANNICK


Surgeon: Saravanan Batres


Estimated Blood Loss (ml): 50


Pathology: other (Small bowel, cecum, appendix)


Condition: stable


Disposition: PACU


Description of Procedure: 


The patient's placed on the operative table in supine position.  She received 

general anesthesia.  Her abdomen was prepped and draped usual fashion.  The 

abdomen was entered through a midline incision.  There was some ascites seen in 

the pleural cavity.  The Bookwalter placed a wound.  The small bowel was run 

from ligament Treitz and in the terminal ileum the small bowel was adhesed to 

the bladder and appeared to be obstructed.  At this point the adhesions to the 

small bowel and bladder were lysed using sharp dissection the bowel appeared to 

have evidence of previous radiation enteritis.  The terminal ileum was 

transected proximally 5 inches from the ileocecal valve and then using the 

Enseal device the mesentery the bowel was divided and then the terminal ileum 

was transected next to the ileocecal valve.  The specimens of pathology.  The 

appendix was visualized.  The appendix underwent incisional appendectomy was 

ligated with a 0 silk ties and transected.  The mesentery the appendix was 

divided with the Enseal device.  At this point a side-to-side functional 

end-to-end staple anastomosis created between the terminal ileum and the right 

colon.  The vessels.  Using the ARAM and TA staplers.  3-0 GI silk sutures using 

a crotch stitch.





A portion of the omentum appeared to be ischemic.  This was divided using the 

Enseal device and sent to pathology.





The small bowel was run again and there is no incision any other obstruction.  

The abdomen was irrigated is no bleeding seen.  The fascia was closed with lo

oped #1 PDS suture.  Skin was closed staples.  Patient top she will was sent to 

recovery in stable condition.

## 2020-03-06 NOTE — P.PN
Progress Note - Text


Progress Note Date: 03/06/20





The patients small bowel obstruction appears to have partially resolved on her 

x-ray.  Patient will undergo exposure laparotomy lysis of adhesion due chronic 

issues with small bowel obstruction.

## 2020-03-06 NOTE — XR
EXAMINATION TYPE: XR abdomen 1V

 

DATE OF EXAM: 3/6/2020

 

COMPARISON: 4/3/2019

 

HISTORY: NG tube placement

 

TECHNIQUE: One view abdominal series

 

FINDINGS:  

The osseous structures are intact.  The bowel gas pattern is nonspecific. Scoliosis of the spine with
 NG tube. Contrast within the bowel in a nonspecific pattern. Surgical changes in the pelvis. Arthrop
athy of the hips. Rounded density overlying the right upper quadrant could be superficial to the marty
ent.

 

IMPRESSION:

1. NG tube seen with the tip likely at the level the gastric fundus.

2. Nonspecific abdomen with retained contrast and fecal debris.

## 2020-03-06 NOTE — P.CNPUL
History of Present Illness


Consult date: 20


Chief complaint: Hypotension


History of present illness: 





This is a 80-year-old female patient with known history of coronary artery 

disease, previous history of endometrial/cervical cancer treated with radiation 

therapy complicated by development of radiation cystitis, history of bowel 

obstruction with previous history of DVTs, coronary artery disease,, 

hypertension and mitral valve prolapse.  This patient came into the hospital on 

2020 complaining of abdominal pain and she was diagnosed having small 

bowel obstruction.  She had some emesis.  NG tube was placed and the patient was

seen by general surgery.  CAT scan of the abdomen and pelvis was done revealing 

dilated small bowel suggestive of partial mechanical small bowel obstruction and

a transition point was not seen.  There was nonobstructive small right renal 

calculus that has been unchanged.  The patient remained nothing by mouth.  The 

patient was taken to the operating room today and the patient was given a 

ileocolectomy with primary anastomosis.  Estimated blood loss was 50 mL.  The 

patient was sent to surgical floor for further monitoring.  After arriving to 

the floor for around 5 minutes the patient started having episodes of 

bradycardia where her heart rate was as low as 40.  She also became hypotensive 

and her most recent blood pressure 69/37.  At that point the patient was 

transferred to the intensive care unit.  We are the process of establishing a 

second IV line and were going to assisted the patient with IV fluids.  She is 

lethargic.  Urine output since she came from the operating room is around 100 

mL.  NG tube is in place and output is showing some coffee-ground material and 

the total amount of output for now is 5 mL.  The surgical wound site is dry 

clean and intact.  The patient has some minimal tenderness upon palpating her 

abdomen.  No bowel sounds.  She is arousable.  She follows some simple commands.

 Denies having any chest pain.  Denies having any shortness of breath.  Pulse ox

is 94% on room air.  Her respiratory rate is around 12.  Her previous 

echocardiogram from 2019 showed an ejection fraction of 55-60%.  She has no 

underlying congestion heart failure or valvular heart disease.  Her  coronary 

intervention was done by Dr. Tobin in 2018 and the patient had successful 

stenting of the ostium of the first diagonal branch and subsequently she had 

another coronary intervention 2019 where she underwent stenting of the mid LAD 

with reduction of the stenosis from 95% down to 0% and she was taken a 

combination of aspirin and Plavix on outpatient basis.  She is currently off 

Plavix and she's been off Plavix for the past 2 days.





Review of Systems


Constitutional: Denies chills, Denies fever


Eyes: bilateral blurred vision, bilateral bulging eye, bilateral decreased 

vision (The patient is legally blind), bilateral loss of peripheral vision, 

bilateral loss of vision, bilateral tunnel vision/blind spots, denies diplopia, 

denies discharge, denies dry eye, denies irritation, denies itching, denies 

pain, denies photophobia


Ears: deny: decreased hearing, ear discharge, earache, tinnitus


Ears, nose, mouth and throat: Denies headache, Denies sore throat


Breasts: absent: as per HPI, change in shape, gynecomastia, masses, nipple 

discharge, pain, skin changes, swelling


Cardiovascular: Reports as per HPI


Respiratory: Reports as per HPI


Gastrointestinal: Reports abdominal pain, Reports nausea, Reports vomiting


Genitourinary: Reports as per HPI


Menstruation: Reports as per HPI


Musculoskeletal: Reports as per HPI


Musculoskeletal: absent: ankle pain, ankle stiffness, ankle swelling


Integumentary: Reports as per HPI


Neurological: Reports as per HPI, Reports weakness


Psychiatric: Reports as per HPI


Endocrine: Reports as per HPI





Past Medical History


Past Medical History: Coronary Artery Disease (CAD), Cancer (uterin cancer 

), Deep Vein Thrombosis (DVT), Eye Disorder, GERD/Reflux, Hyperlipidemia, 

Hypertension, Myocardial Infarction (MI), Mitral Valve Prolapse (MVP), 

Osteoarthritis (OA)


Additional Past Medical History / Comment(s): Hx of bowel obstruction 2018 

while in [place], hx of partial bowel obstruction 2019. Legal blindness due 

to glaucoma, neuropathy bilateral legs. Hx of uterine cancer , endometrial 

and cervical cancer treated with radiation Hx of DVT 2-3 yrs ago. Radiation 

cystitis of the bladder


Last Myocardial Infarction Date:: 18


History of Any Multi-Drug Resistant Organisms: None Reported


Past Surgical History: Breast Surgery, Heart Catheterization With Stent, 

Hysterectomy


Additional Past Surgical History / Comment(s): Eye surgery, breast biopsy, 1 

cardiac stent.


Past Anesthesia/Blood Transfusion Reactions: No Reported Reaction


Additional Past Anesthesia/Blood Transfusion Reaction / Comment(s): Slow to wake


Date of Last Stent Placement:: 


Past Psychological History: No Psychological Hx Reported


Additional Psychological History / Comment(s): Pt resides with her spouse.  She 

is legally blind.  She still manages her medications at this time.  She uses a 

walker or cane to ambulate. Her spouse drives.


Smoking Status: Never smoker


Past Alcohol Use History: None Reported


Additional Past Alcohol Use History / Comment(s): Patient is a lifelong 

nonsmoker, no illicit drug use or alcohol use.  Patient lives at home with her 

.


Past Drug Use History: None Reported





- Past Family History


  ** Mother


Family Medical History: Cancer


Additional Family Medical History / Comment(s): Mother  at age 85 from old 

age with history of uterine cancer.





  ** Brother(s)


Family Medical History: Cancer


Additional Family Medical History / Comment(s): Patient is a total of 2 

brothers.  One has  from colon cancer.  One brother is alive with history of

coronary artery disease status post CABG.





  ** Sister(s)


Family Medical History: Cancer


Additional Family Medical History / Comment(s): Patient has total of 3 sisters 

and all are .  One from end-stage renal disease, one from a brain 

aneurysm and also had history of breast cancer, one from pneumonia 

complications.





  ** Father


Family Medical History: Myocardial Infarction (MI)


Additional Family Medical History / Comment(s):  at 54 from MI.





  ** Daughter(s)


Additional Family Medical History / Comment(s): Patient has one daughter with no

major medical problems.





Medications and Allergies


                                Home Medications











 Medication  Instructions  Recorded  Confirmed  Type


 


Gabapentin [Neurontin] 300 mg PO HS 18 History


 


Brimonidine Tartrate [Alphagan P 1 drop BOTH EYES TID 18 History





0.2% Ophth Soln]    


 


Dorzolamide/Timolol/Pf [Cosopt Pf 1 drop BOTH EYES TID 18 History





2%/5% Ophth Droperette]    


 


Furosemide [Lasix] 20 mg PO DAILY PRN 18 History


 


Atorvastatin [Lipitor] 40 mg PO DAILY #30 tab 18 Rx


 


Clopidogrel [Plavix] 75 mg PO DAILY #30 tab 18 Rx


 


Nitroglycerin Sl Tabs [Nitrostat] 0.4 mg SUBLINGUAL Q5M PRN #25 tab 18 Rx


 


Docusate Sodium [Dok] 100 mg PO HS 18 History


 


Famotidine [Pepcid] 20 mg PO DAILY PRN 19 History


 


Isosorbide Mononitrate ER [Imdur] 30 mg PO DAILY 19 History


 


Aspirin EC [Ecotrin Low Dose] 81 mg PO HS 19 History


 


Metoprolol Tartrate [Lopressor] 25 mg PO BID 19 History


 


Calcium/Vitamin D3 1000iu 1 tab PO BID 20 History


 


Potassium Chloride ER [K-Dur 10] 10 meq PO DAILY PRN 20 History








                                    Allergies











Allergy/AdvReac Type Severity Reaction Status Date / Time


 


ibuprofen [From Advil] AdvReac  Nausea & Verified 20 15:27





   Vomiting  














Physical Exam


Vitals: 


                                   Vital Signs











  Temp Pulse Pulse Resp BP Pulse Ox


 


 20 14:56   47 L   16  91/54  97


 


 20 14:41   58 L   16  148/68  97


 


 20 14:32   69   16  132/68  100


 


 20 14:21   54 L   16  145/65  100


 


 20 14:16   60   16  181/81  100


 


 20 13:56  97.1 F L  73   16  196/90  100


 


 20 12:28    69  16  173/72  98


 


 20 12:06  98.5 F   69  16  213/84  99


 


 20 09:43      153/67 


 


 20 07:50    78  16  


 


 20 07:40  98.1 F   78  16  190/62  99


 


 20 01:47  98 F   68  14  164/72  94 L


 


 20 20:00    72   


 


 20 19:14  98.1 F   72  16  185/75  99








                                Intake and Output











 20





 06:59 14:59 22:59


 


Intake Total  750 


 


Output Total 150 95 


 


Balance -150 655 


 


Intake:   


 


  IV  750 


 


Output:   


 


  Gastric Drainage 100  


 


  Urine 50 20 


 


  Estimated Blood Loss  75 


 


Other:   


 


  Voiding Method  Bedside Commode 


 


  # Voids 2  


 


  Weight  53.524 kg 














Gen. appearance the patient is lethargic.  Calm and comfortable.  She is 

recovering from her anesthetic still.  NG tube is in place.  Coffee-ground 

material is seen within the NG tube.


Head exam was generally normal. There was no scleral icterus or corneal arcus. 

Mucous membranes were moist.


Neck was supple and without jugular venous distension, thyromegaly, or carotid 

bruits. Carotids were easily palpable bilaterally. There was no adenopathy.


Lungs were clear to auscultation and percussion, and with normal diaphragmatic 

excursion. No wheezes or rales were noted. 


Cardiac exam revealed the PMI to be normally situated and sized. The rhythm was 

regular and no extrasystoles were noted during several minutes of auscultation. 

The first and second heart sounds were normal and physiologic splitting of the 

second heart sound was noted. There were no murmurs, rubs, clicks, or gallops.


Abdomen is soft bowel sounds are nearly absent.  Surgical wound site is dry 

clean and intact.  Some minimal direct tenderness.  No rebound tenderness.  No 

guarding.  No abdominal distention.  No ascites.


Examination of the extremities revealed easily palpable radial, femoral and 

pedal pulses. There was no cyanosis, clubbing or edema.


Examination of the skin revealed no evidence of significant rashes, suspicious 

appearing nevi or other concerning lesions.  Mucous membranes are essentially 

dry for now.


Neurologically the patient is lethargic yet arousable.  She can communicate and 

answer simple questions.  No focal neurological deficits for now.





Results





- Laboratory Findings


CBC and BMP: 


                                 20 08:49





                                 20 08:49


ABG











WBC  9.3 k/uL (3.8-10.6)   20  08:49    


 


RBC  3.95 m/uL (3.80-5.40)   20  08:49    


 


Hgb  12.2 gm/dL (11.4-16.0)   20  08:49    


 


Hct  38.9 % (34.0-46.0)   20  08:49    


 


MCV  98.6 fL (80.0-100.0)   20  08:49    


 


MCH  30.8 pg (25.0-35.0)   20  08:49    


 


MCHC  31.3 g/dL (31.0-37.0)   20  08:49    


 


RDW  13.5 % (11.5-15.5)   20  08:49    


 


Plt Count  170 k/uL (150-450)   20  08:49    


 


Neutrophils %  75 %  20  07:46    


 


Lymphocytes %  15 %  20  07:46    


 


Monocytes %  7 %  20  07:46    


 


Eosinophils %  0 %  20  07:46    


 


Basophils %  0 %  20  07:46    


 


Neutrophils #  4.5 k/uL (1.3-7.7)   20  07:46    


 


Lymphocytes #  0.9 k/uL (1.0-4.8)  L  20  07:46    


 


Monocytes #  0.4 k/uL (0-1.0)   20  07:46    


 


Eosinophils #  0.0 k/uL (0-0.7)   20  07:46    


 


Basophils #  0.0 k/uL (0-0.2)   20  07:46    


 


Hypochromasia  Slight   20  08:49    


 


PT  9.8 sec (9.0-12.0)   20  16:40    


 


INR  0.9  (<1.2)   20  16:40    


 


APTT  19.5 sec (22.0-30.0)  L  20  16:40    


 


Sodium  140 mmol/L (137-145)   20  08:49    


 


Potassium  3.2 mmol/L (3.5-5.1)  L  20  08:49    


 


Chloride  107 mmol/L ()   20  08:49    


 


Carbon Dioxide  20 mmol/L (22-30)  L  20  08:49    


 


Anion Gap  13 mmol/L  20  08:49    


 


BUN  12 mg/dL (7-17)   20  08:49    


 


Creatinine  0.66 mg/dL (0.52-1.04)   20  08:49    


 


Est GFR (CKD-EPI)AfAm  >90  (>60 ml/min/1.73 sqM)   20  08:49    


 


Est GFR (CKD-EPI)NonAf  84  (>60 ml/min/1.73 sqM)   20  08:49    


 


Glucose  74 mg/dL (74-99)   20  08:49    


 


POC Glucose (mg/dL)  75 mg/dL (75-99)   20  15:49    


 


POC Glu Operater ID  Mary Velasquez   20  15:49    


 


Plasma Lactic Acid Zia  1.6 mmol/L (0.7-2.0)   20  16:40    


 


Calcium  7.8 mg/dL (8.4-10.2)  L  20  08:49    


 


Total Bilirubin  1.1 mg/dL (0.2-1.3)   20  08:49    


 


AST  24 U/L (14-36)   20  08:49    


 


ALT  14 U/L (4-34)   20  08:49    


 


Alkaline Phosphatase  69 U/L ()   20  08:49    


 


Total Protein  5.7 g/dL (6.3-8.2)  L  20  08:49    


 


Albumin  3.0 g/dL (3.5-5.0)  L  20  08:49    


 


Lipase  215 U/L ()   20  16:40    


 


Urine Color  Light Red   20  10:00    


 


Urine Appearance  Cloudy  (Clear)  H  20  10:00    


 


Urine pH  5.5  (5.0-8.0)   20  10:00    


 


Ur Specific Gravity  1.016  (1.001-1.035)   20  10:00    


 


Urine Protein  1+  (Negative)  H  20  10:00    


 


Urine Glucose (UA)  Negative  (Negative)   20  10:00    


 


Urine Ketones  4+  (Negative)  H  20  10:00    


 


Urine Blood  Large  (Negative)  H  20  10:00    


 


Urine Nitrite  Negative  (Negative)   20  10:00    


 


Urine Bilirubin  Negative  (Negative)   20  10:00    


 


Urine Urobilinogen  <2.0 mg/dL (<2.0)   20  10:00    


 


Ur Leukocyte Esterase  Trace  (Negative)  H  20  10:00    


 


Urine RBC  >182 /hpf (0-5)  H  20  10:00    


 


Urine WBC  40 /hpf (0-5)  H  20  10:00    


 


Ur Squamous Epith Cells  <1 /hpf (0-4)   20  16:40    


 


Urine Bacteria  Rare /hpf (None)  H  20  10:00    


 


Urine Mucus  Rare /hpf (None)  H  20  10:00    


 


Urine Yeast (Budding)  Occasional /hpf (None)  H  20  10:00    


 


Gastric Occult Blood  Positive  (Negative)   20  09:50    





PT/INR, D-dimer











PT  9.8 sec (9.0-12.0)   20  16:40    


 


INR  0.9  (<1.2)   20  16:40    








Abnormal lab findings: 


                                  Abnormal Labs











  20





  16:40 16:40 16:40


 


Hct  46.9 H  


 


Plt Count   


 


Lymphocytes #  0.9 L  


 


APTT   19.5 L 


 


Potassium   


 


Chloride   


 


Carbon Dioxide   


 


Glucose   


 


Calcium   


 


AST   


 


Total Protein   


 


Albumin   


 


Urine Appearance   


 


Urine Protein   


 


Urine Ketones   


 


Urine Blood   


 


Ur Leukocyte Esterase    Moderate H


 


Urine RBC   


 


Urine WBC    6 H


 


Urine Bacteria   


 


Urine Mucus   


 


Urine Yeast (Budding)   














  20





  16:40 07:46 07:46


 


Hct   


 


Plt Count   149 L 


 


Lymphocytes #   0.9 L 


 


APTT   


 


Potassium   


 


Chloride    109 H


 


Carbon Dioxide   


 


Glucose    67 L


 


Calcium    8.2 L


 


AST  37 H  


 


Total Protein    5.8 L


 


Albumin    3.1 L


 


Urine Appearance   


 


Urine Protein   


 


Urine Ketones   


 


Urine Blood   


 


Ur Leukocyte Esterase   


 


Urine RBC   


 


Urine WBC   


 


Urine Bacteria   


 


Urine Mucus   


 


Urine Yeast (Budding)   














  20





  08:49 10:00


 


Hct  


 


Plt Count  


 


Lymphocytes #  


 


APTT  


 


Potassium  3.2 L 


 


Chloride  


 


Carbon Dioxide  20 L 


 


Glucose  


 


Calcium  7.8 L 


 


AST  


 


Total Protein  5.7 L 


 


Albumin  3.0 L 


 


Urine Appearance   Cloudy H


 


Urine Protein   1+ H


 


Urine Ketones   4+ H


 


Urine Blood   Large H


 


Ur Leukocyte Esterase   Trace H


 


Urine RBC   >182 H


 


Urine WBC   40 H


 


Urine Bacteria   Rare H


 


Urine Mucus   Rare H


 


Urine Yeast (Budding)   Occasional H














- Diagnostic Findings


Chest x-ray: image reviewed





Assessment and Plan


Plan: 











1 small bowel obstruction, recurrent, status post expiratory laparotomy and 

ileocolectomy with primary anastomosis.  The patient also had lysis of 

adhesions.  The patient is postop day #0.  Currently the intensive care unit NG 

tube is in place with a minimal amount of coffee-ground material.


2 symptomatic bradycardia, sinus in nature in addition to episodes of 

tachycardia as witnessed in the intensive care unit.  I witnessed an episode 

with a heart rate was sinus and the rate of 50 and subsequently the patient 

became tachycardic with heart rate went up to 130 sinus.  Consider the 

possibility of tachybradycardia syndrome.  She is free of any chest pain for now


3 coronary artery disease with previous stenting of the LAD and previous 

stenting of the diagonal branch in 2019 and 2018 respectively, the patient was 

met him on a combination of aspirin and Plavix and she was taking beta blockers 

in the form of metoprolol


4 preserved LV function based on previous echocardiograms


5 history of uterine cancer treated with radiation therapy


6 history of chronic radiation cystitis with frequent UTIs


7 history of DVT not on any anticoagulants


8 history of hypertension


9 history of mitral valve prolapse


10 legally blind secondary to glaucoma





Plan





The patient was noted to be high also hypotensive in addition to this ongoing 

arrhythmia.  We'll obtain 12-lead EKG.  We'll give the patient 2 L of normal 

saline bolus for now and the patient is currently maintained on IV fluids at the

rate of 100 mL / hour of lactated Ringer


Repeat electrolytes and blood work including cardiac enzymes


Replace potassium


Aspirin and Plavix are currently on hold


Keep NG tube in place and keep the patient nothing by mouth for now


Monitor the blood pressure and utilized pressors if needed.


May need to repeat echocardiogram regarding this tachycardia and bradycardia 

witnessed in the ICU


Check thyroid function test


Hold metoprolol


Sequential compression devices in the lower extremities


We'll continue to follow.

## 2020-03-06 NOTE — P.ANPRN
Procedure Note - Anesthesia





- Nerve Block Performed


  ** Bilateral Transversus Abdominis Single


Time Out Performed: Yes


Date of Procedure: 03/06/20


Procedure Start Time: 12:16


Procedure Stop Time: 12:26


Location of Patient: PreOp


Indication: Acute Post-Operative Pain, Requested by Surgeon


Sedation Type: Sedate with meaningful contact maintained


Preparation: Sterile Prep


Position: Supine


Catheter: None


Needle Types: Facet


Needle Gauge: 21


Ultrasound used to visualize needle placement: Yes


Ultrasound used to observe medication spread: Yes


Injectate: 0.5% Ropivacaine (see comment for volume) (ropivacaine 0.5% 10 CC + 

PFNS 10 cc-- per side)


Blood Aspirated: No


Pain Paresthesia on Injection Noted: No


Resistance on Injection: Normal


Image Stored and Saved: Yes


Events: Uneventful and Well Tolerated

## 2020-03-07 VITALS — SYSTOLIC BLOOD PRESSURE: 166 MMHG | DIASTOLIC BLOOD PRESSURE: 76 MMHG

## 2020-03-07 NOTE — P.PN
Progress Note - Text


Progress Note Date: 20





CODE BLUE TEAM NOTE





Code blue activated @ 2253. Arrived on the scene at 2253.  As per the RN, the 

patient's monitor was noted to be asystole, and upon examination of the patient,

she was found to be unresponsive in her bed. CPR was immediately initiated. The 

patient was noted to be in asystole. She was given a total of epinephrine x8, 

calcium chloride x1 and sodium bicarbonate x2. She was noted to be in V. fib at 

2312 and was shocked with 200 J with an amiodarone 300 mg IVP. She subsequently 

went back into asystole. She was noted to have a faint pulse at 2319 and was 

started on transcutaneous pacing at 180 mA @ 100 bpm. She lost her pulse at 2322

and CPR was re-initiated. ROSC was subsequently achieved at 2325. Discussed the 

case with the . Discussed the very poor prognosis due to CPR for >30 

minutes. The  and the family at the bedside noted that they do not 

believe the patient would wish to undergo any further aggressive intervention. 

The family expressed their wish to transition the patient to comfort care. The 

patient was extubated at 2344 as per their wishes. The patient  at 0005. 

Dr Batres and Dr Romero were notified by the RN.

## 2020-03-07 NOTE — P.PCN
Date of Procedure: 03/07/20


Operative Findings: 





Date of Procedure: 03/06/20


Preoperative Diagnosis: 


Hypotension


Postoperative Diagnosis: 


Hypotension


Procedure(s) Performed: 


Insertion of a triple-lumen catheter


Anesthesia: local


Surgeon: Andrew Romero


Estimated Blood Loss (ml): 0


Pathology: none sent


Condition: critical


Disposition: ICU


Operative Findings: 


Indication:  Hemodynamic monitoring/Intravenous access.





A time-out was completed verifying correct patient, procedure, site, 

positioning, and implant(s) or special equipment if applicable.





The patient was placed in a dependent position appropriate for central line 

placement based on the vein to be cannulated.  The patients left shoulder [or 

  was prepped and draped in sterile fashion.  1% Lidocaine was used to 

anesthetize the surrounding skin area.  A triple lumen 9F Cordis catheter was 

introduced into the left subclavian vein using Seldinger technique.  The 

catheter was threaded smoothly over the guide wire and appropriate blood return 

was obtained.  Each lumen of the catheter was evacuated of air and flushed with 

sterile saline.  The catheter was then sutured in place to the skin and a 

sterile dressing applied.  Perfusion to the extremity distal to the point of 

catheter insertion was checked and found to be adequate.





The patient tolerated the procedure well and there were no complications.

## 2020-03-10 NOTE — CDI
Documentation Clarification Form



Date: 03/10/2020 03:51:46 PM

From: Sneha Rice

Phone: 

If you have a question about this query, please contact Yisel Wang Coding 
Manager at [phone] between 8am and 5pm.

MRN: [id number]

Admit Date: 03/03/2020 07:27:00 PM

Patient Name: Kiki Sullivan

Visit Number: [id number]

Discharge Date:  03/07/2020 08:43:00 AM



ATTENTION: The Clinical Documentation Specialists (CDI) and [facility] Coding Staff 
appreciate your assistance in clarifying documentation. Please respond to the 
clarification below the line at the bottom and electronically sign. The CDI & 
[facility] Coding staff will review the response and follow-up if needed. Please note: 
Queries are made part of the Legal Health Record. If you have any questions, 
please contact the author of this message via ITS.



Dr. Saravanan Batres



Per OR report patient had a terminal ileum obstruction.  Patient's bowel was 
adhered to the bladder and lysis of adhesions was done.  Please clarify if 
obstruction of terminal ileum was due to adhesions.



History/Risk Factors:SBO  radiation history and radiation cystitis

Clinical Indicators: patient with adhesions

Vital Signs:  98 F,    70 bpm,     188/95,     100 RA

Treatment: lysis of adhesions and excision of ileum





In your professional opinion, can you please clarify 

terminal ileum obstruction due to adhesions

Obstruction due to :

   Other, please specify ________

   Unable to determine







___________________________________________________________________________

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

MTDD

## 2020-03-10 NOTE — CDI
Documentation Clarification Form



Date: 03/10/2020 03:41:04 PM

From: Sneha Rice

Phone: 

If you have a question about this query, please contact Yisel Wang Coding 
Manager at [phone] between 8am and 5pm.

MRN: [id number]

Admit Date: 03/03/2020 07:27:00 PM

Patient Name: Kiki Sullivan

Visit Number: [id number]

Discharge Date:  03/07/2020 08:43:00 AM





ATTENTION: The Clinical Documentation Specialists (CDI) and [facility] Coding Staff 
appreciate your assistance in clarifying documentation. Please respond to the 
clarification below the line at the bottom and electronically sign. The CDI & 
[facility] Coding staff will review the response and follow-up if needed. Please note: 
Queries are made part of the Legal Health Record. If you have any questions, 
please contact the author of this message via ITS.



Dr. Saravanan Batres



Documentation in the Operative Report included: The small bowel was adhesed to 
the bladder and appeared to be obstructed.  At this point the adhesions to the 
small bowel and bladder were lysed using sharp dissection.  Please clarify if 
lysis of adhesions was extensive or not extensive.

History/Risk factors:  bowel obstruction

Pre-Operative Diagnosis:SBO

Postoperative Diagnosis:terminal ileum obstruct



In order to capture the severity of condition, please specify the following: 

Extensive lysis of adhesions

Adhesions not extensive

Other:

Unable to determine

     

   

  

___________________________________________________________________________

Extensive lysis of adhesions

MTDD

## 2020-03-12 NOTE — CDI
Documentation Clarification Form



Date: 03/10/2020 03:51:46 PM

From: Sneha Rice

Phone: If you have a question about this query, please contact Yisel Wang 
 at [phone] between 8am and 5pm.

If you have a question about this query, please contact Yisel Wang Coding 
Manager at [phone] between 8am and 5pm.

MRN: [id number]

Admit Date: 03/03/2020 07:27:00 PM

Patient Name: Kiki Sullivan

Visit Number: [id number]

Discharge Date:  03/07/2020 08:43:00 AM



ATTENTION: The Clinical Documentation Specialists (CDI) and [facility] Coding Staff 
appreciate your assistance in clarifying documentation. Please respond to the 
clarification below the line at the bottom and electronically sign. The CDI & 
[facility] Coding staff will review the response and follow-up if needed. Please note: 
Queries are made part of the Legal Health Record. If you have any questions, 
please contact the author of this message via ITS.



Dr. Saravanan aBtres



Per OR report patient had a terminal ileum obstruction.  Patient's bowel was 
adhered to the bladder and lysis of adhesions was done.  Please clarify if 
obstruction of terminal ileum was due to adhesions.



History/Risk Factors:SBO  radiation history and radiation cystitis

Clinical Indicators: patient with adhesions

Vital Signs:  98 F,    70 bpm,     188/95,     100 RA

Treatment: lysis of adhesions and excision of ileum





In your professional opinion, can you please clarify 

terminal ileum obstruction due to adhesions

Obstruction due to :

   Other, please specify ________

   Unable to determine







___________________________________________________________________________

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

MTDD

## 2020-03-31 NOTE — CDI
Documentation Clarification Form



Date: 03/31/2020 11:22:16 AM

From: Zuleika Johnston RN, CCDS

Email: [email or web address] 

MRN: [id number]

Admit Date: 03/03/2020 07:27:00 PM

Patient Name: Kiki Sullivan

Visit Number: [id number]

Discharge Date:  03/07/2020 08:43:00 AM





ATTENTION: The Clinical Documentation Specialists (CDI) and [facility] Coding Staff 
appreciate your assistance in clarifying documentation. Please respond to the 
clarification below the line at the bottom and electronically sign. The CDI & 
[facility] Coding staff will review the response and follow-up if needed. Please note: 
Queries are made part of the Legal Health Record. If you have any questions, 
please contact the author of this message via ITS.



Dr. Saravanan Batres



Please render your opinion on the clinical significance of the patients 
hemoglobin/hematocrit levels.



History/Risk Factors: The 3/3 ED note indicates the patient had a obstruction 
approximately one year ago that resolved without treatment. Patient states that 
she has had a history of endometrial cancer with hysterectomy. S/P radiation 
treatment and radiation cystitis. Patient denies any dysuria, hematuria. S/P  
Ileocolectomy on 3/6 for small bowel obstruction secondary to terminal ileum 
obstruction.  

   

Clinical indicators: 3/5 PN indicates: 'NG to LIS with dark maroon coffee ground
output.' 3/6 Consultant note indicates: 'Currently in the intensive care unit, 
NG tube is in place with a minimal amount of coffee-ground material' 3/6 18:36 
Nursing note indicates: patient had symptomatic bradycardia with HR of 40 and 
hypotension with BP of 63/38, was lethargic and pale.

3/3 Hgb>14.6, 3/5 Hgb>13.0, 3/6 Hgb>9.2

3/3 Hct>46.9, 3/5 Hct>41.4, 3/6 Hct>30.6



Treatment: Lab monitoring,  0.9NS 1L bolus on 3/3 followed by 0.9 NS @75/hr. 
0.9NS 3L IV bolus on 3/6, IV Protonix 40mg BID on 3/5 and 3/6

    

In order to capture the severity of condition, please clarify if the 
labs/clinical indicators signify:



Acute blood loss anemia

Acute on chronic blood loss anemia

Unable to determine

Other, please specify ___________



Unable to determine

MTDD

## 2020-03-31 NOTE — CDI
Documentation Clarification Form



Date: 2020 11:52:30 AM

From: Zuleika Johnston RN, CCDS

Email: [email or web address] 

MRN: [id number]

Admit Date: 2020 07:27:00 PM

Patient Name: Kiki Sullivan

Visit Number: [id number]

Discharge Date:  2020 08:43:00 AM





ATTENTION: The Clinical Documentation Specialists (CDI) and [facility] Coding Staff 
appreciate your assistance in clarifying documentation. Please respond to the 
clarification below the line at the bottom and electronically sign. The CDI & 
[facility] Coding staff will review the response and follow-up if needed. Please note: 
Queries are made part of the Legal Health Record. If you have any questions, 
please contact the author of this message via ITS.



Dr. Saravanan Batres



3/6 Consultant note indicates: 'the patient started having episodes of 
bradycardia where her heart rate was as low as 40. She also became hypotensive 
and her most recent blood pressure 69/37. At that point the patient was 
transferred to the intensive care unit.

 

Patient history/risk factors: Endometrial cancer with radiation in . 
Radiation cystitis.  3/6 Consultant note: 'This patient came into the hospital 
on 2020 complaining of abdominal pain and she was diagnosed having small 
bowel obstruction. She had some emesis. NG tube was placed and the patient was 
seen by general surgery. CAT scan of the abdomen and pelvis was done revealing 
dilated small bowel suggestive of partial mechanical small bowel obstruction and
a transition point was not seen.There was nonobstructive small right renal 
calculus that has been unchanged.The patient remained nothing by mouth. The 
patient was taken to the operating room today and the patient was given a 
ileocolectomy with primary anastomosis. Estimated blood loss was 50 mL. Hx of 
CAD and Mitral valve prolapse per 3/3 H&P 



Clinical Indicators: 3/6 Consultant note indicates bradycardia, hypotension, 
lethargic. Coffee ground material is seen within the NGT. Possiblity of tachy-
carlos a syndrome. Went into cardiac arrest/asystole/Vfib on starting on 3/6 
@22:55, ROSC then family decided to make comfort care and the patient  
following extubation. 

Vitals: 3/6 @ 15:20 BP 63/38, HR 40



Treatment: Transfer to ICU on 3/6. 3/6 Consultant note indicates: The patient 
was noted to be hypotensive in addition to this ongoing arrhythmia. We'll obtain
12-lead EKG. We'll give the patient 2 L of normal saline bolus for now and the 
patient is currently maintained on IV fluids at the rate of 100 mL / hour of 
lactated Ringer. Repeat electrolytes and blood work including cardiac enzymes.  
Replace potassium.  Aspirin and Plavix are currently on hold. Keep NG tube in 
place and keep the patient nothing by mouth for now. Monitor the blood pressure 
and utilized pressors if needed.May need to repeat echocardiogram regarding this
tachycardia and bradycardia witnessed in the ICU. S/P ACLS with final decision 
of comfort care by the family. 

 

In your professional opinion, can you please specify if the above is clinically 
significant for: 



Cardiogenic Shock

         Cause______________

Hemorrhagic Shock

         Cause______________

Hypovolemic Shock

         Cause______________          

Other, please specify_________

Unable to determine





Unable to determine



MTDD

## 2020-04-13 NOTE — P.DS
Providers


Date of admission: 


20 19:27





Expected date of discharge: 20


Attending physician: 


Saravanan Batres





Consults: 





                                        





20 08:14


Consult Physician Routine 


   Consulting Provider: Isauro Hwang


   Consult Reason/Comments: medical management


   Do you want consulting provider notified?: Yes





20 15:44


Consult Physician Urgent 


   Consulting Provider: Andrew Romero


   Consult Reason/Comments: hypotension


   Do you want consulting provider notified?: Yes











Primary care physician: 


[facility] Course: 





Cyst 80-year-old female who developed a small bowel obstruction.  Patient was 

initially treated with conservative therapy.  Patient's small bowel obstruction 

did not resolve.  She underwent exploratory laparotomy.  Please yesterday for 

details.  Patient's postoperative stay in the ICU is, again with bradycardia.  

Patient became Comfort Care .


Procedures: 





Exploratory laparotomy, small bowel resection


Patient Condition at Discharge: Fair





Plan - Discharge Summary


Discharge Rx Participant: Yes


New Discharge Prescriptions: 


No Action


   Gabapentin [Neurontin] 300 mg PO HS


   Brimonidine Tartrate [Alphagan P 0.2% Ophth Soln] 1 drop BOTH EYES TID


   Furosemide [Lasix] 20 mg PO DAILY PRN


     PRN Reason: Edema


   Dorzolamide/Timolol/Pf [Cosopt Pf 2%/5% Ophth Droperette] 1 drop BOTH EYES 

TID


   Atorvastatin [Lipitor] 40 mg PO DAILY #30 tab


   Clopidogrel [Plavix] 75 mg PO DAILY #30 tab


   Nitroglycerin Sl Tabs [Nitrostat] 0.4 mg SUBLINGUAL Q5M PRN #25 tab


     PRN Reason: Chest Pain


   Docusate Sodium [Dok] 100 mg PO HS


   Isosorbide Mononitrate ER [Imdur] 30 mg PO DAILY


   Famotidine [Pepcid] 20 mg PO DAILY PRN


     PRN Reason: gerd


   Aspirin EC [Ecotrin Low Dose] 81 mg PO HS


   Metoprolol Tartrate [Lopressor] 25 mg PO BID


   Calcium/Vitamin D3 1000iu 1 tab PO BID


   Potassium Chloride ER [K-Dur 10] 10 meq PO DAILY PRN


     PRN Reason: W/LASIX


Discharge Medication List





Gabapentin [Neurontin] 300 mg PO HS 18 [History]


Brimonidine Tartrate [Alphagan P 0.2% Ophth Soln] 1 drop BOTH EYES TID 18 

[History]


Dorzolamide/Timolol/Pf [Cosopt Pf 2%/5% Ophth Droperette] 1 drop BOTH EYES TID 

18 [History]


Furosemide [Lasix] 20 mg PO DAILY PRN 18 [History]


Atorvastatin [Lipitor] 40 mg PO DAILY #30 tab 18 [Rx]


Clopidogrel [Plavix] 75 mg PO DAILY #30 tab 18 [Rx]


Nitroglycerin Sl Tabs [Nitrostat] 0.4 mg SUBLINGUAL Q5M PRN #25 tab 18 

[Rx]


Docusate Sodium [Dok] 100 mg PO HS 18 [History]


Famotidine [Pepcid] 20 mg PO DAILY PRN 19 [History]


Isosorbide Mononitrate ER [Imdur] 30 mg PO DAILY 19 [History]


Aspirin EC [Ecotrin Low Dose] 81 mg PO HS 19 [History]


Metoprolol Tartrate [Lopressor] 25 mg PO BID 19 [History]


Calcium/Vitamin D3 1000iu 1 tab PO BID 20 [History]


Potassium Chloride ER [K-Dur 10] 10 meq PO DAILY PRN 20 [History]








Follow up Appointment(s)/Referral(s): 


Ashwin Reynoso DO [Primary Care Provider] - 1-2 days


Discharge Disposition: 





- Preliminary Cause of Death


Preliminary Cause of Death: Bradycardia

## 2022-06-29 NOTE — US
EXAMINATION TYPE: US carotid duplex BILAT

 

DATE OF EXAM: 7/11/2018

 

COMPARISON: Carotid ultrasound October 6, 2016

 

CLINICAL HISTORY: I25.10 CAD,R53.83 FATIGUE,I25.82 COD.

 

EXAM MEASUREMENTS: 

 

RIGHT:  Peak Systolic Velocity (PSV) cm/sec

----- Right CCA:  80.8  

----- Right ICA:  90.5     

----- Right ECA:  120.9   

ICA/CCA ratio:  1.1    

 

RIGHT:  End Diastole cm/sec

----- Right CCA:  15.4   

----- Right ICA:  24.1      

----- Right ECA:  6.9     

 

LEFT:  Peak Systolic Velocity (PSV) cm/sec

----- Left CCA:  55.2  

----- Left ICA:  71.0   

----- Left ECA:  87.9  

ICA/CCA ratio:  1.3  

 

LEFT:  End Diastole cm/sec

----- Left CCA:  12.4  

----- Left ICA:  21.1   

----- Left ECA:  17.2 

 

VERTEBRALS (direction of flow):

Right Vertebral: Antegrade

Left Vertebral: Antegrade

 

Rhythm:  Normal

 

Grayscale images show mild eccentric hyperechoic plaque at bilateral carotid bulb level. Velocity alisa
surements and ratios remain within normal limits in bilateral internal carotid arteries.

 

IMPRESSION: No hemodynamically significant stenosis is seen in either internal carotid artery. Health Maintenance Due   Topic Date Due   • Annual Physical (ages 3-18)  Never done       Patient is due for topics as listed above but is not proceeding with Annual Wellness Visit (ages 3-18) at this time.  Appt scheduled to perform Annual Wellness Visit (ages 3-18).

## 2022-08-08 NOTE — P.HPIM
History of Present Illness


H&P Date: 18


Chief Complaint: Left arm pain





This is a 78-year-old  female patient of Dr. Reynoso with a 

previous medical history significant for hypertension and hypertensive cardio 

vascular disease with left ventricular hypertrophy, hyperlipidemia, history of 

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 

prolapse, also history of DVT.  Patient had a recent hospitalization beginning 

of  for accelerated hypertension was found to have elevated troponins with 

acute coronary syndrome underwent heart catheterization and stenting of the 

first diagonal branch with Dr. Tobin.  Patient states that she has followed up 

with Dr. Tobin and has had no other new medications since her stent was 

placed.  Her blood pressure at home in the morning usually runs 120 systolic or 

lower and she states in the afternoon highest is 143 until last evening.  Her 

blood pressure was in the 190s.  She does state that she took her medications 

as scheduled.  She had developed left arm pain as well did not take 

nitroglycerin at home.  It lasted about an hour.  She denies any nausea, cold 

sweats, choking with eating.  She does state that her whole body becomes weak 

once in a while but no specific sided weakness.  She states she has had a 

chronic cough for year that she thinks is due to gabapentin.  She denies having 

any fever.





Patient came into [facility] emergency center for evaluation.  

Her blood pressure was 214/88.  Patient then also had chest discomfort.  EKG 

showed no acute ST changes.  Troponins have been 0.012, 0.030, 0.024.  White 

count was normal at 5.2, hemoglobin 13.4, electrolytes within normal limits, 

creatinine 0.94.  Blood sugar 106.  Chest x-ray shows mild cardiomegaly without 

change.  No gross heart failure.  No change.  In the emergency center, patient 

was given a dose of amlodipine 5 mg, full dose aspirin, hydralazine 10 mg IV 

push, lisinopril 10 mg with improvement of her blood pressure and patient was 

admitted to the selective care unit and cardiology consult requested.





Review of Systems


All systems: negative


Constitutional: Reports weakness, Denies chills, Denies fever


Eyes: denies blurred vision, denies pain


Ears, nose, mouth and throat: Denies headache, Denies sore throat, Denies 

vertigo


Cardiovascular: Reports chest pain, Denies decreased exercise tolerance, Denies 

dyspnea on exertion, Denies edema, Denies leg edema, Denies lightheadedness, 

Denies shortness of breath, Denies syncope


Respiratory: Denies cough, Denies cough with sputum, Denies dyspnea, Denies 

excessive sputum, Denies hemoptysis, Denies home oxygen, Denies wheezing


Gastrointestinal: Denies abdominal pain, Denies diarrhea, Denies nausea, Denies 

vomiting


Genitourinary: Denies dysuria, Denies hematuria


Musculoskeletal: Denies myalgias


Integumentary: Denies pruritus, Denies rash


Neurological: Denies confusion, Denies numbness, Denies weakness


Psychiatric: Denies anxiety, Denies depression


Endocrine: Denies fatigue, Denies weight change





Past Medical History


Past Medical History: Coronary Artery Disease (CAD), Cancer, Deep Vein 

Thrombosis (DVT), Hypertension, Osteoarthritis (OA)


Additional Past Medical History / Comment(s): mitral valve prolapse, legal 

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 

history of DVT, stent placed 2018


History of Any Multi-Drug Resistant Organisms: None Reported


Past Surgical History: Hysterectomy


Additional Past Surgical History / Comment(s): eye surgery, endometrial and 

cervical cancer with radiation


Past Anesthesia/Blood Transfusion Reactions: No Reported Reaction


Past Psychological History: No Psychological Hx Reported


Smoking Status: Never smoker


Past Alcohol Use History: None Reported


Additional Past Alcohol Use History / Comment(s): Patient is a lifelong 

nonsmoker, no illicit drug use or alcohol use.


Past Drug Use History: None Reported





- Past Family History


  ** Mother


Family Medical History: Cancer, Renal Disease


Additional Family Medical History / Comment(s): uterine CA





  ** Brother(s)


Family Medical History: Cancer


Additional Family Medical History / Comment(s): colon CA





  ** Sister(s)


Family Medical History: Cancer


Additional Family Medical History / Comment(s): colon and breast CA





  ** Father


Family Medical History: Myocardial Infarction (MI)


Additional Family Medical History / Comment(s):  at 54 from MI





Medications and Allergies


 Home Medications











 Medication  Instructions  Recorded  Confirmed  Type


 


Gabapentin [Neurontin] 300 mg PO HS 18 History


 


Brimonidine Tartrate [Alphagan P 1 drops BOTH EYES TID 18 History





0.2% Ophth Soln]    


 


Calcium Carbonate/Vitamin D3 1 tab PO BID 18 History





[Calcium 600-Vit D3 400 Caplet]    


 


Dorzolamide/Timolol/Pf [Cosopt Pf 1 applicator BOTH EYES TID 18 

History





2%/5% Ophth Droperette]    


 


Furosemide [Lasix] 20 mg PO DAILY PRN 18 History


 


Potassium 99 mg PO DAILY PRN 18 History


 


Aspirin EC [Ecotrin Low Dose] 81 mg PO DAILY #30 tablet.dr 18 Rx


 


Atorvastatin [Lipitor] 40 mg PO DAILY #30 tab 18 Rx


 


Clopidogrel [Plavix] 75 mg PO DAILY #30 tab 18 Rx


 


Metoprolol Tartrate [Lopressor] 25 mg PO BID #60 tab 18 Rx


 


Nitroglycerin Sl Tabs [Nitrostat] 0.4 mg SUBLINGUAL Q5M PRN #25 tab 18 Rx











 Allergies











Allergy/AdvReac Type Severity Reaction Status Date / Time


 


ibuprofen [From Advil] AdvReac  Nausea & Verified 18 19:46





   Vomiting  














Physical Exam


Vitals: 


 Vital Signs











  Temp Pulse Pulse Resp BP BP Pulse Ox


 


 18 04:00  97.9 F   80  19   189/79  100


 


 18 23:53    83  18   


 


 18 23:52  96.5 F L   83  18   168/70  100


 


 18 22:36  97.4 F L  77   18  171/72   100


 


 18 22:13   85   16  181/75   100


 


 18 22:11  96.5 F L   83  18   168/70  100


 


 18 21:31   74   18  172/74   99


 


 18 20:42   68   18  196/79   100


 


 18 19:14  98.9 F  69   18  214/88   99








 Intake and Output











 18





 22:59 06:59 14:59


 


Other:   


 


  Voiding Method  Toilet 


 


  # Voids 1 1 


 


  # Bowel Movements 1  


 


  Weight 55 kg 56 kg 














General appearance: average body habitus, no acute distress





- EENT


Eyes: anicteric sclerae, EOMI, PERRLA, no ptosis, no scleral icterus, normal 

appearance


ENT: hearing grossly normal, NA/AT, normal oropharynx, no thrush


Ears: bilateral: normal





- Neck


Neck: no lymphadenopathy, normal ROM, no rigidity, no stridor, no thyromegaly


Carotids: bilateral: upstroke normal





- Respiratory


Respiratory: bilateral: diminished, negative: dullness, rales, rhonchi, wheezing

, prolonged expiration





- Cardiovascular


Rhythm: regular


Heart sounds: normal: S1, S2


Abnormal Heart Sounds: systolic murmur, no S3 Gallop





- Gastrointestinal


General gastrointestinal: normal bowel sounds, soft, no splenomegaly, no 

tenderness, no umbilical hernia, no ventral hernia





- Integumentary


Integumentary: normal, normal turgor





- Neurologic


Neurologic: CNII-XII intact





- Musculoskeletal


Musculoskeletal: strength equal bilaterally





- Psychiatric


Psychiatric: A&O x's 3, appropriate affect, intact judgment & insight





Results


CBC & Chem 7: 


 18 19:47





 18 19:47


Labs: 


 Abnormal Lab Results - Last 24 Hours (Table)











  18 Range/Units





  19:47 19:47 


 


RDW  16.1 H   (11.5-15.5)  %


 


Lymphocytes #  0.9 L   (1.0-4.8)  k/uL


 


BUN   18 H  (7-17)  mg/dL


 


Glucose   106 H  (74-99)  mg/dL


 


AST   41 H  (14-36)  U/L














Thrombosis Risk Factor Assmnt





- DVT/VTE Prophylaxis


DVT/VTE Prophylaxis: Pharmacologic Prophylaxis ordered





- Choose All That Apply


Any of the Below Risk Factors Present?: No


Other Risk Factors: Yes


Each Risk Factor Represents 3 Points: Age 75 years or older


Other congenital or acquired thrombophilia - If yes, enter type in comment: No


Thrombosis Risk Factor Assessment Total Risk Factor Score: 3


Thrombosis Risk Factor Assessment Level: Moderate Risk





Assessment and Plan


Plan: 





1.  Left arm pain, chest pain secondary to uncontrolled hypertension.  

Cardiology consult is appreciated Norvasc 5 mg daily and lisinopril 10 mg daily 

added.  Continue Lopressor 25 mg twice daily.  Imdur 30 mg daily added.  Plan 

to monitor patient overnight and possible discharge tomorrow.





2.  Coronary artery disease status post recent stent in 2018.  Continue 

aspirin 81 mg daily, Lipitor 40 mg daily, Plavix 75 mg daily, Lopressor 25 mg 

twice daily.





3.  Hypertension.  Continue patient on metoprolol 25 mg orally once every day.  

Amlodipine and lisinopril added.





4.  History of Endometrial cancer back in  with radiation colitis.  Stable 

at this time.





5.  Hyperlipidemia.  Continue Lipitor 40 mg orally once every day.





6.  DVT prophylaxis.  Currently on heparin subcu.





7.  GI prophylaxis.  Continue Pepcid.





8.  Peripheral neuropathy.  Continue gabapentin 300 mg bedtime.





Admit to inpatient.  Estimated length of stay 2 midnights.





Patient is full code.





Discharge plan: Return home on Saturday





Impression and plan of care have been directed as dictated by the signing 

physician.  Yazmin Boogie nurse practitioner acting as scribe for signing 

physician.
Left arm;